# Patient Record
Sex: FEMALE | Race: WHITE | Employment: UNEMPLOYED | URBAN - NONMETROPOLITAN AREA
[De-identification: names, ages, dates, MRNs, and addresses within clinical notes are randomized per-mention and may not be internally consistent; named-entity substitution may affect disease eponyms.]

---

## 2019-04-04 ENCOUNTER — HOSPITAL ENCOUNTER (INPATIENT)
Age: 74
LOS: 11 days | Discharge: HOME HEALTH | DRG: 477 | End: 2019-04-15
Attending: INTERNAL MEDICINE | Admitting: INTERNAL MEDICINE
Payer: MEDICARE

## 2019-04-04 DIAGNOSIS — Z00.6 EXAMINATION FOR NORMAL COMPARISON FOR CLINICAL RESEARCH: ICD-10-CM

## 2019-04-04 DIAGNOSIS — M86.00 ACUTE HEMATOGENOUS OSTEOMYELITIS, UNSPECIFIED SITE (HCC): Primary | ICD-10-CM

## 2019-04-04 PROBLEM — N30.00 ACUTE CYSTITIS WITHOUT HEMATURIA: Status: ACTIVE | Noted: 2019-04-04

## 2019-04-04 PROBLEM — I10 ESSENTIAL HYPERTENSION: Status: ACTIVE | Noted: 2019-04-04

## 2019-04-04 PROBLEM — Z79.4 CONTROLLED TYPE 2 DIABETES MELLITUS WITHOUT COMPLICATION, WITH LONG-TERM CURRENT USE OF INSULIN (HCC): Status: ACTIVE | Noted: 2019-04-04

## 2019-04-04 PROBLEM — E11.9 CONTROLLED TYPE 2 DIABETES MELLITUS WITHOUT COMPLICATION, WITH LONG-TERM CURRENT USE OF INSULIN (HCC): Status: ACTIVE | Noted: 2019-04-04

## 2019-04-04 PROBLEM — M86.9 OSTEOMYELITIS (HCC): Status: ACTIVE | Noted: 2019-04-04

## 2019-04-04 LAB
ALBUMIN SERPL-MCNC: 2.6 G/DL (ref 3.5–5.1)
ALP BLD-CCNC: 94 U/L (ref 38–126)
ALT SERPL-CCNC: < 5 U/L (ref 11–66)
ANION GAP SERPL CALCULATED.3IONS-SCNC: 13 MEQ/L (ref 8–16)
AST SERPL-CCNC: 6 U/L (ref 5–40)
BILIRUB SERPL-MCNC: 0.5 MG/DL (ref 0.3–1.2)
BILIRUBIN DIRECT: < 0.2 MG/DL (ref 0–0.3)
BUN BLDV-MCNC: 3 MG/DL (ref 7–22)
CALCIUM SERPL-MCNC: 8.7 MG/DL (ref 8.5–10.5)
CHLORIDE BLD-SCNC: 105 MEQ/L (ref 98–111)
CO2: 23 MEQ/L (ref 23–33)
CREAT SERPL-MCNC: 0.4 MG/DL (ref 0.4–1.2)
ERYTHROCYTE [DISTWIDTH] IN BLOOD BY AUTOMATED COUNT: 15.8 % (ref 11.5–14.5)
ERYTHROCYTE [DISTWIDTH] IN BLOOD BY AUTOMATED COUNT: 47.8 FL (ref 35–45)
GFR SERPL CREATININE-BSD FRML MDRD: > 90 ML/MIN/1.73M2
GLUCOSE BLD-MCNC: 105 MG/DL (ref 70–108)
GLUCOSE BLD-MCNC: 93 MG/DL (ref 70–108)
HCT VFR BLD CALC: 32.4 % (ref 37–47)
HEMOGLOBIN: 10 GM/DL (ref 12–16)
LACTIC ACID: 1.1 MMOL/L (ref 0.5–2.2)
MCH RBC QN AUTO: 26.2 PG (ref 26–33)
MCHC RBC AUTO-ENTMCNC: 30.9 GM/DL (ref 32.2–35.5)
MCV RBC AUTO: 85 FL (ref 81–99)
PLATELET # BLD: 286 THOU/MM3 (ref 130–400)
PMV BLD AUTO: 10 FL (ref 9.4–12.4)
POTASSIUM SERPL-SCNC: 4.4 MEQ/L (ref 3.5–5.2)
PROCALCITONIN: 0.06 NG/ML (ref 0.01–0.09)
RBC # BLD: 3.81 MILL/MM3 (ref 4.2–5.4)
SODIUM BLD-SCNC: 141 MEQ/L (ref 135–145)
TOTAL PROTEIN: 6.9 G/DL (ref 6.1–8)
WBC # BLD: 9.3 THOU/MM3 (ref 4.8–10.8)

## 2019-04-04 PROCEDURE — 6360000002 HC RX W HCPCS: Performed by: PHYSICIAN ASSISTANT

## 2019-04-04 PROCEDURE — 84145 PROCALCITONIN (PCT): CPT

## 2019-04-04 PROCEDURE — 83036 HEMOGLOBIN GLYCOSYLATED A1C: CPT

## 2019-04-04 PROCEDURE — 85027 COMPLETE CBC AUTOMATED: CPT

## 2019-04-04 PROCEDURE — 83605 ASSAY OF LACTIC ACID: CPT

## 2019-04-04 PROCEDURE — 80053 COMPREHEN METABOLIC PANEL: CPT

## 2019-04-04 PROCEDURE — 99223 1ST HOSP IP/OBS HIGH 75: CPT | Performed by: PHYSICIAN ASSISTANT

## 2019-04-04 PROCEDURE — 6370000000 HC RX 637 (ALT 250 FOR IP): Performed by: PHYSICIAN ASSISTANT

## 2019-04-04 PROCEDURE — 87040 BLOOD CULTURE FOR BACTERIA: CPT

## 2019-04-04 PROCEDURE — 82948 REAGENT STRIP/BLOOD GLUCOSE: CPT

## 2019-04-04 PROCEDURE — 1200000003 HC TELEMETRY R&B

## 2019-04-04 PROCEDURE — 36415 COLL VENOUS BLD VENIPUNCTURE: CPT

## 2019-04-04 PROCEDURE — 82248 BILIRUBIN DIRECT: CPT

## 2019-04-04 PROCEDURE — 2580000003 HC RX 258: Performed by: PHYSICIAN ASSISTANT

## 2019-04-04 RX ORDER — ONDANSETRON 2 MG/ML
4 INJECTION INTRAMUSCULAR; INTRAVENOUS EVERY 6 HOURS PRN
Status: DISCONTINUED | OUTPATIENT
Start: 2019-04-04 | End: 2019-04-07

## 2019-04-04 RX ORDER — DOCUSATE SODIUM 100 MG/1
100 CAPSULE, LIQUID FILLED ORAL DAILY
Status: DISCONTINUED | OUTPATIENT
Start: 2019-04-04 | End: 2019-04-07

## 2019-04-04 RX ORDER — METOPROLOL TARTRATE 50 MG/1
50 TABLET, FILM COATED ORAL 2 TIMES DAILY
Status: DISCONTINUED | OUTPATIENT
Start: 2019-04-04 | End: 2019-04-15 | Stop reason: HOSPADM

## 2019-04-04 RX ORDER — NICOTINE POLACRILEX 4 MG
15 LOZENGE BUCCAL PRN
Status: DISCONTINUED | OUTPATIENT
Start: 2019-04-04 | End: 2019-04-15 | Stop reason: HOSPADM

## 2019-04-04 RX ORDER — ACETAMINOPHEN 500 MG
1000 TABLET ORAL EVERY 6 HOURS PRN
Status: DISCONTINUED | OUTPATIENT
Start: 2019-04-04 | End: 2019-04-15 | Stop reason: HOSPADM

## 2019-04-04 RX ORDER — CLOPIDOGREL BISULFATE 75 MG/1
75 TABLET ORAL NIGHTLY
Status: ON HOLD | COMMUNITY
End: 2019-05-16 | Stop reason: HOSPADM

## 2019-04-04 RX ORDER — DOCUSATE SODIUM 100 MG/1
100 CAPSULE, LIQUID FILLED ORAL DAILY
Status: ON HOLD | COMMUNITY
End: 2019-05-16 | Stop reason: HOSPADM

## 2019-04-04 RX ORDER — PHENOL 1.4 %
1 AEROSOL, SPRAY (ML) MUCOUS MEMBRANE DAILY
Status: ON HOLD | COMMUNITY
End: 2019-05-16 | Stop reason: HOSPADM

## 2019-04-04 RX ORDER — CALCIUM CARBONATE 500(1250)
500 TABLET ORAL DAILY
Status: DISCONTINUED | OUTPATIENT
Start: 2019-04-04 | End: 2019-04-15 | Stop reason: HOSPADM

## 2019-04-04 RX ORDER — DEXTROSE MONOHYDRATE 25 G/50ML
12.5 INJECTION, SOLUTION INTRAVENOUS PRN
Status: DISCONTINUED | OUTPATIENT
Start: 2019-04-04 | End: 2019-04-15 | Stop reason: HOSPADM

## 2019-04-04 RX ORDER — INSULIN GLARGINE 100 [IU]/ML
INJECTION, SOLUTION SUBCUTANEOUS NIGHTLY
Status: ON HOLD | COMMUNITY
End: 2019-05-16 | Stop reason: HOSPADM

## 2019-04-04 RX ORDER — CEFAZOLIN SODIUM 1 G/50ML
1 INJECTION, SOLUTION INTRAVENOUS EVERY 8 HOURS
Status: DISCONTINUED | OUTPATIENT
Start: 2019-04-04 | End: 2019-04-04

## 2019-04-04 RX ORDER — HYDROCHLOROTHIAZIDE 12.5 MG/1
12.5 CAPSULE, GELATIN COATED ORAL DAILY
COMMUNITY
End: 2019-04-15 | Stop reason: HOSPADM

## 2019-04-04 RX ORDER — ATORVASTATIN CALCIUM 20 MG/1
20 TABLET, FILM COATED ORAL NIGHTLY
Status: DISCONTINUED | OUTPATIENT
Start: 2019-04-04 | End: 2019-04-15 | Stop reason: HOSPADM

## 2019-04-04 RX ORDER — INSULIN GLARGINE 100 [IU]/ML
0.25 INJECTION, SOLUTION SUBCUTANEOUS NIGHTLY
Status: DISCONTINUED | OUTPATIENT
Start: 2019-04-04 | End: 2019-04-07

## 2019-04-04 RX ORDER — ACETAMINOPHEN 500 MG
1000 TABLET ORAL EVERY 6 HOURS PRN
COMMUNITY
End: 2019-04-15 | Stop reason: HOSPADM

## 2019-04-04 RX ORDER — SENNA PLUS 8.6 MG/1
1 TABLET ORAL PRN
Status: ON HOLD | COMMUNITY
End: 2019-05-16 | Stop reason: HOSPADM

## 2019-04-04 RX ORDER — HYDROCHLOROTHIAZIDE 12.5 MG/1
12.5 CAPSULE, GELATIN COATED ORAL DAILY
Status: DISCONTINUED | OUTPATIENT
Start: 2019-04-04 | End: 2019-04-14

## 2019-04-04 RX ORDER — AMLODIPINE BESYLATE 5 MG/1
5 TABLET ORAL DAILY
Status: DISCONTINUED | OUTPATIENT
Start: 2019-04-04 | End: 2019-04-15 | Stop reason: HOSPADM

## 2019-04-04 RX ORDER — SODIUM CHLORIDE 9 MG/ML
INJECTION, SOLUTION INTRAVENOUS CONTINUOUS
Status: DISCONTINUED | OUTPATIENT
Start: 2019-04-04 | End: 2019-04-06

## 2019-04-04 RX ORDER — DEXTROSE MONOHYDRATE 50 MG/ML
100 INJECTION, SOLUTION INTRAVENOUS PRN
Status: DISCONTINUED | OUTPATIENT
Start: 2019-04-04 | End: 2019-04-15 | Stop reason: HOSPADM

## 2019-04-04 RX ORDER — METOPROLOL TARTRATE 50 MG/1
50 TABLET, FILM COATED ORAL 2 TIMES DAILY
Status: ON HOLD | COMMUNITY
End: 2019-05-16 | Stop reason: HOSPADM

## 2019-04-04 RX ORDER — NITROFURANTOIN MACROCRYSTALS 50 MG/1
50 CAPSULE ORAL DAILY
COMMUNITY
End: 2019-04-15 | Stop reason: HOSPADM

## 2019-04-04 RX ORDER — LISINOPRIL 20 MG/1
20 TABLET ORAL 2 TIMES DAILY
Status: DISCONTINUED | OUTPATIENT
Start: 2019-04-04 | End: 2019-04-15 | Stop reason: HOSPADM

## 2019-04-04 RX ORDER — LISINOPRIL 20 MG/1
20 TABLET ORAL 2 TIMES DAILY
Status: ON HOLD | COMMUNITY
End: 2019-05-16 | Stop reason: HOSPADM

## 2019-04-04 RX ORDER — RANITIDINE 150 MG/1
150 CAPSULE ORAL 2 TIMES DAILY
Status: ON HOLD | COMMUNITY
End: 2019-05-16 | Stop reason: HOSPADM

## 2019-04-04 RX ORDER — ATORVASTATIN CALCIUM 20 MG/1
20 TABLET, FILM COATED ORAL NIGHTLY
Status: ON HOLD | COMMUNITY
End: 2019-05-16 | Stop reason: HOSPADM

## 2019-04-04 RX ORDER — MAGNESIUM 30 MG
250 TABLET ORAL DAILY
Status: ON HOLD | COMMUNITY
End: 2019-05-16 | Stop reason: HOSPADM

## 2019-04-04 RX ORDER — MULTIVITAMIN/IRON/FOLIC ACID 18MG-0.4MG
250 TABLET ORAL DAILY
Status: DISCONTINUED | OUTPATIENT
Start: 2019-04-04 | End: 2019-04-08

## 2019-04-04 RX ORDER — AMLODIPINE BESYLATE 5 MG/1
5 TABLET ORAL DAILY
Status: ON HOLD | COMMUNITY
End: 2019-05-16 | Stop reason: HOSPADM

## 2019-04-04 RX ORDER — CITALOPRAM 20 MG/1
10 TABLET ORAL DAILY
Status: DISCONTINUED | OUTPATIENT
Start: 2019-04-04 | End: 2019-04-15 | Stop reason: HOSPADM

## 2019-04-04 RX ORDER — CITALOPRAM 10 MG/1
10 TABLET ORAL DAILY
Status: ON HOLD | COMMUNITY
End: 2019-05-16 | Stop reason: HOSPADM

## 2019-04-04 RX ADMIN — METOPROLOL TARTRATE 50 MG: 50 TABLET, FILM COATED ORAL at 22:16

## 2019-04-04 RX ADMIN — ONDANSETRON 4 MG: 2 INJECTION INTRAMUSCULAR; INTRAVENOUS at 21:53

## 2019-04-04 RX ADMIN — LISINOPRIL 20 MG: 20 TABLET ORAL at 22:16

## 2019-04-04 RX ADMIN — INSULIN GLARGINE 24 UNITS: 100 INJECTION, SOLUTION SUBCUTANEOUS at 22:28

## 2019-04-04 RX ADMIN — ENOXAPARIN SODIUM 40 MG: 40 INJECTION SUBCUTANEOUS at 22:58

## 2019-04-04 RX ADMIN — VANCOMYCIN HYDROCHLORIDE 1500 MG: 1 INJECTION, POWDER, LYOPHILIZED, FOR SOLUTION INTRAVENOUS at 22:31

## 2019-04-04 RX ADMIN — SODIUM CHLORIDE: 9 INJECTION, SOLUTION INTRAVENOUS at 21:49

## 2019-04-04 RX ADMIN — ATORVASTATIN CALCIUM 20 MG: 20 TABLET, FILM COATED ORAL at 22:17

## 2019-04-04 RX ADMIN — PIPERACILLIN SODIUM,TAZOBACTAM SODIUM 3.38 G: 3; .375 INJECTION, POWDER, FOR SOLUTION INTRAVENOUS at 22:58

## 2019-04-04 SDOH — HEALTH STABILITY: MENTAL HEALTH: HOW OFTEN DO YOU HAVE A DRINK CONTAINING ALCOHOL?: NEVER

## 2019-04-05 ENCOUNTER — APPOINTMENT (OUTPATIENT)
Dept: CT IMAGING | Age: 74
DRG: 477 | End: 2019-04-05
Attending: INTERNAL MEDICINE
Payer: MEDICARE

## 2019-04-05 ENCOUNTER — APPOINTMENT (OUTPATIENT)
Dept: MRI IMAGING | Age: 74
DRG: 477 | End: 2019-04-05
Attending: INTERNAL MEDICINE
Payer: MEDICARE

## 2019-04-05 PROBLEM — E44.0 MODERATE MALNUTRITION (HCC): Status: ACTIVE | Noted: 2019-04-05

## 2019-04-05 PROBLEM — E11.69 TYPE 2 DIABETES MELLITUS WITH OTHER SPECIFIED COMPLICATION (HCC): Status: ACTIVE | Noted: 2019-04-04

## 2019-04-05 LAB
AVERAGE GLUCOSE: 138 MG/DL (ref 70–126)
BILIRUBIN URINE: NEGATIVE
BLOOD, URINE: NEGATIVE
CHARACTER, URINE: CLEAR
COLOR: YELLOW
GLUCOSE BLD-MCNC: 73 MG/DL (ref 70–108)
GLUCOSE BLD-MCNC: 79 MG/DL (ref 70–108)
GLUCOSE BLD-MCNC: 80 MG/DL (ref 70–108)
GLUCOSE URINE: NEGATIVE MG/DL
HBA1C MFR BLD: 6.6 % (ref 4.4–6.4)
KETONES, URINE: NEGATIVE
LEUKOCYTE ESTERASE, URINE: NEGATIVE
NITRITE, URINE: NEGATIVE
PH UA: 6.5 (ref 5–9)
PROTEIN UA: NEGATIVE
SPECIFIC GRAVITY, URINE: 1.01 (ref 1–1.03)
UROBILINOGEN, URINE: 1 EU/DL (ref 0–1)

## 2019-04-05 PROCEDURE — 51798 US URINE CAPACITY MEASURE: CPT

## 2019-04-05 PROCEDURE — 6360000002 HC RX W HCPCS: Performed by: PHYSICIAN ASSISTANT

## 2019-04-05 PROCEDURE — 2580000003 HC RX 258: Performed by: PHYSICIAN ASSISTANT

## 2019-04-05 PROCEDURE — 3209999900 CT COMPARISON OF OUTSIDE FILMS

## 2019-04-05 PROCEDURE — 81003 URINALYSIS AUTO W/O SCOPE: CPT

## 2019-04-05 PROCEDURE — 2580000003 HC RX 258: Performed by: INTERNAL MEDICINE

## 2019-04-05 PROCEDURE — 6370000000 HC RX 637 (ALT 250 FOR IP): Performed by: PHYSICIAN ASSISTANT

## 2019-04-05 PROCEDURE — 6370000000 HC RX 637 (ALT 250 FOR IP): Performed by: INTERNAL MEDICINE

## 2019-04-05 PROCEDURE — 1200000003 HC TELEMETRY R&B

## 2019-04-05 PROCEDURE — 82948 REAGENT STRIP/BLOOD GLUCOSE: CPT

## 2019-04-05 PROCEDURE — 99232 SBSQ HOSP IP/OBS MODERATE 35: CPT | Performed by: INTERNAL MEDICINE

## 2019-04-05 PROCEDURE — 6360000002 HC RX W HCPCS: Performed by: INTERNAL MEDICINE

## 2019-04-05 PROCEDURE — 92610 EVALUATE SWALLOWING FUNCTION: CPT

## 2019-04-05 PROCEDURE — 3209999900 MRI COMPARISON OF OUTSIDE FILMS

## 2019-04-05 RX ORDER — BISACODYL 10 MG
10 SUPPOSITORY, RECTAL RECTAL ONCE
Status: COMPLETED | OUTPATIENT
Start: 2019-04-05 | End: 2019-04-05

## 2019-04-05 RX ORDER — DEXTROSE MONOHYDRATE 25 G/50ML
12.5 INJECTION, SOLUTION INTRAVENOUS ONCE
Status: COMPLETED | OUTPATIENT
Start: 2019-04-05 | End: 2019-04-05

## 2019-04-05 RX ORDER — HYDROCHLOROTHIAZIDE 12.5 MG/1
12.5 CAPSULE, GELATIN COATED ORAL ONCE
Status: COMPLETED | OUTPATIENT
Start: 2019-04-05 | End: 2019-04-06

## 2019-04-05 RX ORDER — SODIUM CHLORIDE 450 MG/100ML
INJECTION, SOLUTION INTRAVENOUS CONTINUOUS
Status: CANCELLED | OUTPATIENT
Start: 2019-04-05

## 2019-04-05 RX ORDER — LACTOBACILLUS RHAMNOSUS GG 10B CELL
1 CAPSULE ORAL
Status: DISCONTINUED | OUTPATIENT
Start: 2019-04-06 | End: 2019-04-15 | Stop reason: HOSPADM

## 2019-04-05 RX ORDER — POLYETHYLENE GLYCOL 3350 17 G/17G
17 POWDER, FOR SOLUTION ORAL DAILY
Status: DISCONTINUED | OUTPATIENT
Start: 2019-04-05 | End: 2019-04-07

## 2019-04-05 RX ORDER — ONDANSETRON 2 MG/ML
4 INJECTION INTRAMUSCULAR; INTRAVENOUS EVERY 4 HOURS
Status: COMPLETED | OUTPATIENT
Start: 2019-04-05 | End: 2019-04-06

## 2019-04-05 RX ADMIN — ONDANSETRON 4 MG: 2 INJECTION INTRAMUSCULAR; INTRAVENOUS at 05:32

## 2019-04-05 RX ADMIN — CITALOPRAM 10 MG: 20 TABLET, FILM COATED ORAL at 10:05

## 2019-04-05 RX ADMIN — ONDANSETRON 4 MG: 2 INJECTION INTRAMUSCULAR; INTRAVENOUS at 17:11

## 2019-04-05 RX ADMIN — ENOXAPARIN SODIUM 40 MG: 40 INJECTION SUBCUTANEOUS at 11:53

## 2019-04-05 RX ADMIN — SODIUM CHLORIDE: 9 INJECTION, SOLUTION INTRAVENOUS at 21:32

## 2019-04-05 RX ADMIN — CEFTRIAXONE SODIUM 2 G: 2 INJECTION, POWDER, FOR SOLUTION INTRAMUSCULAR; INTRAVENOUS at 12:50

## 2019-04-05 RX ADMIN — BISACODYL 10 MG: 10 SUPPOSITORY RECTAL at 11:39

## 2019-04-05 RX ADMIN — AMLODIPINE BESYLATE 5 MG: 5 TABLET ORAL at 10:03

## 2019-04-05 RX ADMIN — ONDANSETRON 4 MG: 2 INJECTION INTRAMUSCULAR; INTRAVENOUS at 21:51

## 2019-04-05 RX ADMIN — ONDANSETRON 4 MG: 2 INJECTION INTRAMUSCULAR; INTRAVENOUS at 12:27

## 2019-04-05 RX ADMIN — PIPERACILLIN SODIUM,TAZOBACTAM SODIUM 3.38 G: 3; .375 INJECTION, POWDER, FOR SOLUTION INTRAVENOUS at 06:16

## 2019-04-05 RX ADMIN — DEXTROSE MONOHYDRATE 12.5 G: 25 INJECTION, SOLUTION INTRAVENOUS at 17:58

## 2019-04-05 RX ADMIN — SODIUM CHLORIDE: 9 INJECTION, SOLUTION INTRAVENOUS at 11:04

## 2019-04-05 RX ADMIN — METOPROLOL TARTRATE 50 MG: 50 TABLET, FILM COATED ORAL at 11:51

## 2019-04-05 RX ADMIN — HYDROCHLOROTHIAZIDE 12.5 MG: 12.5 CAPSULE ORAL at 10:13

## 2019-04-05 ASSESSMENT — PAIN SCALES - GENERAL: PAINLEVEL_OUTOF10: 0

## 2019-04-05 NOTE — H&P
History & Physical        Patient:  Jorgito Hendrix  YOB: 1945    MRN: [id number]     Acct: [de-identified]    PCP: No primary care provider on file. Date of Admission: 4/4/2019    Date of Service: Pt seen/examined on 04/04/19  and Admitted to Inpatient with expected LOS greater than two midnights due to medical therapy. Chief Complaint:  Suspected Osteomyelitis with discitis at T8-T9      History Of Present Illness:      72 y.o. female with PMH of UTI, CVA, DM II, dysphagia, who presented to [facility] with nausea and vomiting that has been present for about 3 weeks. Per pt , pt was recently hospitalized 6 weeks ago at [facility] for urinary infection that \"got into her blood\". Pt was discharged on abx for 14 day and seemed to improve.  states that on and off for the past 3 weeks pt has had a cough that has been productive of sputum with associated vomiting and crampy abd pain. Per , who acts as pt's caretaker, they decided to be evaluated at the ER at [facility] on 04/02 given pt's general malaise with inability to pinpoint exactly an area of pain/discomfort. Per [facility] ED note MRI performed there and found discitis at T8-T9 with possible associated osteomyelitis, and it was suggested pt be transferred to a larger hospital with more resources. Since arrival pt states that her abdominal pain is better along with the nausea and she denies emesis. Denies any sort of back pain or discomfort even upon movement and manipulation. Denies fever/chills/lightheadedness/CP/SOB/diarrhea/dysuria and has no further complaints at this time.        Past Medical History:          Diagnosis Date    Cerebral artery occlusion with cerebral infarction ([address]     Right sided weakness    Chronic kidney disease     Diabetes mellitus ([facility]     GERD (gastroesophageal reflux disease)     Dysphagia/GERD    Hypertension        Past Surgical History:          Procedure Laterality Date    HYSTERECTOMY         Medications Prior to Admission:      Prior to Admission medications    Medication Sig Start Date End Date Taking? Authorizing Provider   senna (SENOKOT) 8.6 MG tablet Take 1 tablet by mouth as needed for Constipation   Yes Historical Provider, MD   amLODIPine (NORVASC) 5 MG tablet Take 5 mg by mouth daily   Yes Historical Provider, MD   clopidogrel (PLAVIX) 75 MG tablet Take 75 mg by mouth nightly   Yes Historical Provider, MD   insulin glargine (LANTUS) 100 UNIT/ML injection vial Inject into the skin nightly   Yes Historical Provider, MD   metoprolol tartrate (LOPRESSOR) 50 MG tablet Take 50 mg by mouth 2 times daily   Yes Historical Provider, MD   hydrochlorothiazide (MICROZIDE) 12.5 MG capsule Take 12.5 mg by mouth daily   Yes Historical Provider, MD   acetaminophen (TYLENOL) 500 MG tablet Take 1,000 mg by mouth every 6 hours as needed for Pain   Yes Historical Provider, MD   atorvastatin (LIPITOR) 20 MG tablet Take 20 mg by mouth nightly   Yes Historical Provider, MD   calcium carbonate 600 MG TABS tablet Take 1 tablet by mouth daily   Yes Historical Provider, MD   citalopram (CELEXA) 10 MG tablet Take 10 mg by mouth daily   Yes Historical Provider, MD   lisinopril (PRINIVIL;ZESTRIL) 20 MG tablet Take 20 mg by mouth 2 times daily   Yes Historical Provider, MD   magnesium 30 MG tablet Take 250 mg by mouth daily   Yes Historical Provider, MD   metFORMIN (GLUCOPHAGE) 1000 MG tablet Take 1,000 mg by mouth 2 times daily (with meals)   Yes Historical Provider, MD   ranitidine (ZANTAC) 150 MG capsule Take 150 mg by mouth 2 times daily   Yes Historical Provider, MD   docusate sodium (COLACE) 100 MG capsule Take 100 mg by mouth daily   Yes Historical Provider, MD   nitrofurantoin (MACRODANTIN) 50 MG capsule Take 50 mg by mouth daily   Yes Historical Provider, MD       Allergies:  Patient has no known allergies. Social History:      The patient currently lives at home with . TOBACCO:   reports that she has never smoked. She has never used smokeless tobacco.  ETOH:   reports that she does not drink alcohol. Family History:      Reviewed in detail and negative for DM, CAD, Cancer, CVA. Positive as follows:    No family history on file. Diet:  DIET CARB CONTROL;    REVIEW OF SYSTEMS:   Pertinent positives as noted in the HPI. All other systems reviewed and negative. PHYSICAL EXAM:    BP (!) 177/74   Pulse 79   Temp 98.5 °F (36.9 °C) (Oral)   Resp 18   Ht 5' (1.524 m)   Wt 213 lb 14.4 oz (97 kg) Comment: pts  states he thinks she weighs less, will zero bed next time pt is out of it  LMP  (LMP Unknown) Comment: post menopase  SpO2 94%   Breastfeeding? No   BMI 41.77 kg/m²     General appearance:  No apparent distress, appears sickly, appears stated age and cooperative, obese. HEENT:  Normal cephalic, atraumatic without obvious deformity. Pupils equal, round, and reactive to light. Extra ocular muscles intact. Conjunctivae/corneas clear. Neck: Supple, with full range of motion. No jugular venous distention. Trachea midline. Respiratory:  Normal respiratory effort. Clear to auscultation, bilaterally without Rales/Wheezes/Rhonchi. Cardiovascular:  Regular rate and rhythm with normal S1/S2 without murmurs, rubs or gallops. Abdomen: Soft, non-tender, non-distended with normal bowel sounds. Musculoskeletal:  No clubbing, cyanosis, +1 edema bilaterally. Full range of motion without deformity left side, right sided deficits. Skin: Skin color, texture, turgor normal.  No rashes or lesions. Neurologic:  Neurovascularly intact without any focal sensory/motor deficits.  Cranial nerves: II-XII intact, grossly non-focal, right sided hemiparesis   Psychiatric:  Alert and oriented, thought content appropriate, normal insight  Capillary Refill: Brisk,< 3 seconds   Peripheral Pulses: +2 palpable, equal bilaterally       Labs:     No results for input(s): WBC, HGB, HCT, PLT in the last 72 hours. No results for input(s): NA, K, CL, CO2, BUN, CREATININE, CALCIUM, PHOS in the last 72 hours. Invalid input(s): MAGNES  No results for input(s): AST, ALT, BILIDIR, BILITOT, ALKPHOS in the last 72 hours. No results for input(s): INR in the last 72 hours. No results for input(s): [place] in the last 72 hours. Urinalysis:    No results found for: NITRU, WBCUA, BACTERIA, RBCUA, BLOODU, SPECGRAV, GLUCOSEU    Intake & Output:  No intake/output data recorded. No intake/output data recorded. Radiology:         No orders to display        DVT prophylaxis: Lovenox    Code Status: Full Code      PT/OT Eval Status: on case     Carlos Alberto Hardy    Diagnosis Date Noted    Osteomyelitis [address] [M86.9] 04/04/2019    Acute cystitis without hematuria [N30.00] 04/04/2019    Controlled type 2 diabetes mellitus without complication, with long-term current use of insulin ([facility] [address] [E11.9, Z79.4] 04/04/2019    Essential hypertension [I10] 04/04/2019       PLAN:    1. Osteomyelitis  - transferred from [facility], MRI showed discitis at T8-T9 with possible osteo  - consult to ID  - pt started on Vanc and Zosyn pending blood culture results  - CBC, IVFs  - currently afebrile, WBC of     2. UTI  - chronic problem, pt recently diagnosed in Feb + for Proteus  - UA at [facility] + for nitrites  - abx coverage from #1  - external catheter in place    3. DM II, uncontrolled  - last HgbA1c per [facility], 9.5  -hypoglycemia protocol in place, carb control diet, POCT glucose ac/hs  - insulin sliding scale    4. HTN  - BP of 177/74  - continue home medication: Norvasc and Microzide         Thank you No primary care provider on file. for the opportunity to be involved in this patient's care.     Electronically signed by Kavon Westbrook PA-C on 4/4/2019 at 8:47 PM

## 2019-04-05 NOTE — PROGRESS NOTES
Pharmacy Note  Vancomycin Consult    Darren Álvarez is a 72 y.o. female started on Vancomycin for Osteomyelitis; consult received from [facility] to manage therapy. Also receiving the following antibiotics: Zosyn. Patient Active Problem List   Diagnosis    Osteomyelitis ([address]       Allergies:  Patient has no known allergies. Temp max: 98.5    No results for input(s): BUN in the last 72 hours. No results for input(s): CREATININE in the last 72 hours. No results for input(s): WBC in the last 72 hours. No intake or output data in the 24 hours ending 04/04/19 2139    Culture Date Source Results   4/4/19 BC#1 In process   4/4/19 BC#2 In process            Ht Readings from Last 1 Encounters:   04/04/19 5' (1.524 m)        Wt Readings from Last 1 Encounters:   04/04/19 213 lb 14.4 oz (97 kg)         Body mass index is 41.77 kg/m². CrCl: (awaiting lab)      Assessment/Plan:  Will initiate vancomycin 1500 mg IV x1 pending lab. Timing of trough level will be determined based on culture results, renal function, and clinical response. SCr to be monitored. Thank you for the consult. Will continue to follow.      Nicola Tian 4/4/2019 9:43 PM

## 2019-04-05 NOTE — PROGRESS NOTES
Patient admitted to [phone] from [facility]. Patient oriented to room and call light. Christiano Cloud at bedside. Call light placed within reach. Please refer to flowsheets.

## 2019-04-05 NOTE — PROGRESS NOTES
[facility]           Progress Note    Patient Name:  Aramis Recinos  Medical Record Number:  [id number]  YOB: 1945    Date:  4/5/2019  Time:  9:44 AM  Completed By:  Phillip Holguin RN    Reason for Palliative Care Evaluation:  Code status; goals    Current Issues:  []  Pain  []  Fatigue  []  Nausea  []  Anxiety  []  Depression  []  Shortness of Breath  [x]  Constipation:no BM x 1 1/2 weeks  [x]  Appetite: only eating 3-4 bites/meal x 3-4 weeks  []  Other:    Advance Directives:none on file  [] [facility] DNR Form  [] Living Will  [] Medical POA    Current Code Status  [x] Full Resuscitation  [] DNR-Comfort Care-Arrest  [] DNR-Comfort Care  [] Limited   [] No CPR   [] No shock   [] No ET intubation/reintubation   [] No resuscitative medications   [] Other limitation:    Performance Status:  30    Family/Patient Discussion:  Patient resting in bed.  at the bedside. Palliative care introduced. When asked how she is doing, patient states 2040.  states that this is the answer she always gives. Patient states that her appetite is poor but denies any pain/discomfort. Patient closes eyes and stops interaction with this RN.  shares that the patient has not had a BM for 1 1/2 weeks.  states that the patient has only been eating 3-4 bites of food/meal for the past 3-4 weeks and has had a weight loss.  states that the patient has been bed bound since her CVA 2 years ago.  states that they have no children but his sister does relieve him so that he may go to the store as needed.  states that he has a nurse come occasionally to assist but that he is her sole care taker. Conversation about code status levels and what each level entails. Discussed complications associated with resuscitative measures such as rib fractures, brain and organ damage.  states that the patient has always wished for full code status and he wishes to respect this.  does have questions regarding DNR form and information/discussion given. Much emotional support given. Plan/Follow-Up:  Updated primary RN. Dr. Carter Getting text messaged for suppository for no BM x 1 1/2 weeks.  does state that MOM or other laxatives give patient diarrhea-did inform Dr. Carter Getting of this. Dietician consult placed for poor appetite over the past month. Please call palliative care if any other needs arise.     Leopoldo Pour, RN  4/5/2019,  9:44 AM

## 2019-04-05 NOTE — FLOWSHEET NOTE
[address] [address]  PHYSICAL THERAPY MISSED TREATMENT NOTE  ACUTE CARE  [facility]              Missed Treatment  PT attempted 2x this date. Pt currently does not have any activity orders. RN, Fior Mosquera is aware. PT waiting for clearance from ortho/neuro regarding the finding on the MRI regarding \"discitis with bone erosion at the level of lower thoracic spine\". PT will re-attempt as time allows, pt is appropriate and plan of care has been established.

## 2019-04-05 NOTE — PROCEDURES
Bladder scan was completed by JOHN Franz at 1655. The residual amount of 364 ml was reported to [employer].

## 2019-04-05 NOTE — PROGRESS NOTES
Pharmacy Vancomycin Consult     Vancomycin Day: 2  Current Dosing: Vancomycin IV 1500 mg x1    Temp max:  96.7    Recent Labs     04/04/19 2127   BUN 3*       Recent Labs     04/04/19 2127   CREATININE 0.4       Recent Labs     04/04/19 2127   WBC 9.3         Intake/Output Summary (Last 24 hours) at 4/5/2019 0053  Last data filed at 4/4/2019 2310  Gross per 24 hour   Intake 950 ml   Output 300 ml   Net 650 ml       Culture Date Source Results   4/4/19 BC#1 In process   4/4/19 BC#2 In process            Ht Readings from Last 1 Encounters:   04/04/19 5' (1.524 m)        Wt Readings from Last 1 Encounters:   04/04/19 213 lb 14.4 oz (97 kg)         Body mass index is 41.77 kg/m². CrCl: 196 mL/min      Assessment/Plan:   Lab resulted will continue Vancomycin IV 1500 mg q12h starting at 1030 4/5/19. Will plan to draw trough prior to the 4th dose. Level not ordered at this time.

## 2019-04-05 NOTE — PROGRESS NOTES
[address] [place] [address]  SPEECH THERAPY  [facility]  Bedside Swallowing Evaluation      SLP Individual Minutes  Time In: 5956  Time Out: 0193  Minutes: 12  Timed Code Treatment Minutes: 0 Minutes       Date: 2019  Patient Name: Naya Coughlin      CSN: [id number]   : 1945  (72 y.o.)  Gender: female   Referring Physician:  Dr Vidya Canada  Diagnosis: suspected osteomyelitis with discitis  Secondary Diagnosis:  dysphagia  History of Present Illness/Injury: 72 y.o. female with PMH of UTI, CVA, DM II, dysphagia, who presented to [address] [place] [address] with nausea and vomiting that has been present for about 3 weeks. Per pt , pt was recently hospitalized 6 weeks ago at [facility] for urinary infection that \"got into her blood\". Pt was discharged on abx for 14 day and seemed to improve.  states that on and off for the past 3 weeks pt has had a cough that has been productive of sputum with associated vomiting and crampy abd pain. Per , who acts as pt's caretaker, they decided to be evaluated at the ER at [facility] on  given pt's general malaise with inability to pinpoint exactly an area of pain/discomfort. Per [facility] ED note MRI performed there and found discitis at T8-T9 with possible associated osteomyelitis, and it was suggested pt be transferred to a larger hospital with more resources. Since arrival pt states that her abdominal pain is better along with the nausea and she denies emesis. Denies any sort of back pain or discomfort even upon movement and manipulation. Denies fever/chills/lightheadedness/CP/SOB/diarrhea/dysuria and has no further complaints at this time.    Speech to assess swallowing function to determine safest oral diet     Past Medical History:   Diagnosis Date    Cerebral artery occlusion with cerebral infarction ([facility] [address]     Right sided weakness    Chronic kidney disease     Diabetes mellitus ([facility] [address]     GERD (gastroesophageal reflux disease) Dysphagia/GERD    Hypertension        Pain:  Did not state    Current Diet: Regular and thin    Respiratory Status: [x] Independent     Behavioral Observation: [x] Alert  and cooperative    ORAL MECHANISM EVALUATION:         Comments:  Facial / Labial [x]WFL [] Impaired []DNT    Lingual [x]WFL [] Impaired []DNT    Dentition [x]WFL [] Impaired []DNT    Velum [x]WFL [] Impaired []DNT    Vocal Quality [x]WFL [] Impaired []DNT    Sensation []WFL [] Impaired [x]DNT    Cough [x]WFL [] Impaired []DNT    Other: []WFL [] Impaired []DNT    Other: []WFL [] Impaired []DNT        PATIENT WAS EVALUATED USING:   Thin liquids by straw and colin cracker    ORAL PHASE: [x] WFL      PHARYNGEAL PHASE: [x] WFL: Pharyngeal phase appears WFLs, but cannot completely rule out pharyngeal phase deficits from a bedside swallow evaluation alone. SIGNS AND SYMPTOMS OF LARYNGEAL PENETRATION / ASPIRATION:  [] NO sign/symptoms of aspiration evident with this evaluation, but cannot rule out silent aspiration. [] Throat Clear  [] Immediate Cough [x] Delayed Cough: 1x following thin liquids  [] Wet Vocal Quality  [] Change in Pulmonary Status  [] OTHER:    IMPRESSIONS:  Pt presents with essentially normal oral and pharyngeal function with no overt s/s of aspiration noted with solids or liquids. Pt did exhibit dry cough 1x following thin liquids by straw. Spouse reports that cough has been inconsistent over past few months. Pt does have history of CVA and previous dysphagia, but spouse reports pt has been tolerating regular diet for some time. No history of pneumonia, which would be suspicious for aspiration. Pt with clear lung sounds and no pulmonary issues noted upon this admission. Recommend pt remain on regular and thn diet with speech therapy to monitor tolerance and pulmonary status 1-2 times. RECOMMENDATIONS:     Modified Barium Swallow: [] Is indicated to further assess    [x] Is NOT indicated at this time;  Will recommend as appropriate. DIET RECOMMENDATIONS:  Regular and thin    STRATEGIES: [] Strategies pending MBS results. [x] Full upright position  [] Small bite/sip [] No Straw [] Multiple Swallow  [] Chin tuck [] Head turn [x] Pulmonary monitoring [] Oral care after all meals  [] Supervision  [] Medication in applesauce []Direct 1:1 Supervision  [] Spoon all liquids [] Alternate solid / liquid [] Limit distractions [] Monitor for fatigue  [] PMV in place for all po [] OTHER:      EDUCATION:   Learner: [x]Patient [x] Significant other [] Son/Daughter [] Parent     [] Other:   Education: [x] Reviewed results and recommendations of this evaluation     [x] Reviewed diet and strategies     [] Reviewed signs, symptoms and risk of aspiration     [] Demonstrated how to thick liquid appropriately. [x] Reviewed goals and Plan of Care     [] OTHER:   Method: [x] Discussion [] Demonstration [] Hand-out     [] OTHER:   Evaluation of Education:     [x] Verbalizes understanding  [] Demonstrates with assistance     [] Demonstrates without assistance []Needs further instruction     [] No evidence of learning  [] Family not present    PATIENT GOALS: [] Pt did not state. Will further assess during treatment. [] Return to the least restricted diet possible     [x] Return to previous level of function     [] OTHER:    PLAN / TREATMENT RECOMMENDATIONS:  [x] Skilled SLP intervention on acute care 3-5 x per week or until goals met and/or pt plateaus in function.   Specific interventions for next session may include: diet monitor    SHORT TERM GOALS:  Short-term Goals  Timeframe for Short-term Goals: 2 weeks  Goal 1: Pt will tolerate regular and thin diet withoput s/s of aspiration to ensure safe and adequate nutrition and hydration    LONG TERM GOALS:  No LTG due to short ELOS       [place] [facility] M.S. [facility]/[id number]

## 2019-04-05 NOTE — FLOWSHEET NOTE
04/05/19 1448   Provider Notification   Reason for Communication Evaluate   Provider Name Dr HAIDER MILNER [facility]   Provider Notification Physician   Method of Communication Secure Message   Response No new orders   Notification Time 56     Notified Dr HAIDER MILNER [facility] that patient had only voided 150 this shift, last BP and that patient had an emesis when she took her microzide.

## 2019-04-05 NOTE — CONSULTS
Consults                                  CONSULTATION NOTE :ID       Patient - Jose Enrique Rawls,  Age - 72 y.o.    - 1945      Room Number - 7K-20/020-A   N -  [id number]   [facility]  Date of Admission -  2019  5:20 PM  Patient's PCP: No primary care provider on file. Requesting Physician: Frederic Zuleta MD    REASON FOR CONSULTATION   Discitis/osteomyelitis of thoracic spine. HISTORY OF PRESENT ILLNESS       This is a very pleasant 72 y.o. female who was admitted to the hospital with a chief complaints of generalized weakness, nausea vomiting and poor appetite. Patient was referred from outside hospital.  After she was found to have discitis of the thoracic spine. Patient was treated last month with E. coli bacteremia due to Proteus and was treated appropriately with 2 weeks of antibiotics. However according to her , she has been very weak, has poor appetite and has been complaining of back pain. She had previous history of stroke and has weakness on the right side of her body. She denies any trauma, no previous history of back surgery. She has no urinary or GI incontinence. Lab work was drawn and her MRI showed discitis with bone erosion at the level of lower thoracic spine. She was also started on treatment for urinary tract infection. She had previous history of UTI including stones in her kidney. After she came to the hospital she was empirically started on Zosyn and vancomycin. She denies any fever or chills. She has limited mobility due to her stroke and deconditioning. Her  takes care of her.   She is diabetic, has chronic kidney disease and had history of stroke    PAST MEDICAL  HISTORY       Past Medical History:   Diagnosis Date    Cerebral artery occlusion with cerebral infarction ([address]     Right sided weakness    Chronic kidney disease     Diabetes mellitus (HCC)     GERD (gastroesophageal reflux disease)     Dysphagia/GERD    Hypertension  Controlled type 2 diabetes mellitus without complication, with long-term current use of insulin (HCC) E11.9, Z79.4    Essential hypertension I10    Moderate malnutrition ([facility]) E44.0           Impression and Recommendation:   · Discitis/osteomyelitis of the lower thoracic spine: Patient will need aspiration of the area for Gram stain and culture by interventional radiology. At this time, patient does not need empiric broad-spectrum antibiotics. · Urinary tract infection: We'll obtain final culture report from outside hospital, will narrow antibiotics to IV ceftriaxone. · Old stroke with right sided weakness  · Diabetes mellitus  Infection Control:   · Standard precautions  Discharge Planning (Coordination of out patient care:   Patient will need IV antibiotics on discharge,  Will need long-term IV antibiotics in extended care facility   Thank you Mary Hightower MD for allowing me to participate in this patient's care.     Emily Matos MD,FACP 4/5/2019 12:57 PM

## 2019-04-05 NOTE — PLAN OF CARE
Problem: Nutrition  Goal: Optimal nutrition therapy  Outcome: Ongoing   Nutrition Problem: Moderate malnutrition, In context of acute illness or injury  Intervention: Food and/or Nutrient Delivery: Continue current diet, Start ONS(as able to tolerate, otherwise consider clear liquids)  Nutritional Goals: Pt. will consume 75% at meals during LOS.

## 2019-04-05 NOTE — CARE COORDINATION
DISCHARGE BARRIERS  4/5/19, 12:27 PM    Reason for Referral: From home may need ECF placement  Mental Status: Alert and oriented  Decision Making: Spouse speaks for patient  Family/Social/Home Environment: Patient resides at home with her spouse. Spouse stated that patient stays on one floor of the house and has a ramp into the home. Spouse stated that his sister assist him at time and helps with transporting patient. Current Services: [facility] once a week for RN  Current Equipment: wheelchair, criss lift, hospital bed  Payment Source: [employer]  Concerns or Barriers to Discharge: Spouse unsure of plan until he see what treatment plan will be  Collabrative List of ECF/HH were provided: declined. Patient has [place] and has gone to [facility] the past for rehab. Teach Back Method used with spouse regarding care plan and discharge plan. Patient verbalize understanding of the plan of care and contribute to goal setting. Anticipated Needs/Discharge Plan: Spoke with Nuzhat at [facility] and she confirmed services for nursing once weekly. Spouse plans home with current services at discharge.      Electronically signed by DOUG Smith on 4/5/2019 at 12:27 PM

## 2019-04-05 NOTE — PROGRESS NOTES
Spoke to primary nurse regarding patient stage 1 to buttock area as well as reddened skin folds. Nurse using zinc protective cream to buttocks and interdry to skin folds. Continue treatment. Make sure pt is on hercules dream air support surface or SPR. Waffle cushion in chair. Implement every 2 hour turns. Consult wound ostomy as needed.

## 2019-04-05 NOTE — CARE COORDINATION
4/5/19, 11:42 AM      Sandra Mendez       Admitted from: direct from [facility] 4/4/2019/ [facility] day: 1   Location: 7K-20/020-A Reason for admit: Osteomyelitis [facility]) [M86.9] Status: inpatient  Admit order signed?: yes  PMH:  has a past medical history of Cerebral artery occlusion with cerebral infarction ([facility] [address] Chronic kidney disease, Diabetes mellitus ([facility] [address] GERD (gastroesophageal reflux disease), and Hypertension. Procedure: Dr Niharika Mckeon ordered disc space biopsy  Pertinent abnormal Imaging:no  Medications:  Scheduled Meds:   cefTRIAXone (ROCEPHIN) IV  2 g Intravenous Q24H    amLODIPine  5 mg Oral Daily    atorvastatin  20 mg Oral Nightly    calcium elemental  500 mg Oral Daily    citalopram  10 mg Oral Daily    docusate sodium  100 mg Oral Daily    hydrochlorothiazide  12.5 mg Oral Daily    lisinopril  20 mg Oral BID    Magnesium Oxide  250 mg Oral Daily    metoprolol tartrate  50 mg Oral BID    insulin glargine  0.25 Units/kg Subcutaneous Nightly    insulin lispro  0-6 Units Subcutaneous TID WC    insulin lispro  0-3 Units Subcutaneous Nightly    enoxaparin  40 mg Subcutaneous BID     Continuous Infusions:   dextrose      sodium chloride 100 mL/hr at 04/05/19 1104      Pertinent Info/Orders/Treatment Plan:  Dr Niharika Mckeon consulted. IV antibiotics. Pain management. Diabetic management. Lovenox. PT/OT. Hx stroke. Telemetry. Diet: DIET CARB CONTROL; Dietary Nutrition Supplements: Clear Liquid Oral Supplement  Dietary Nutrition Supplements: Other Oral Supplement (see comment)   Smoking status:  reports that she has never smoked.  She has never used smokeless tobacco.   PCP: ECF  Readmission: no  Readmission Risk Score: 8%    Discharge Planning  Current Residence:  Private Residence  Living Arrangements:  Spouse/Significant Other   Support Systems:  Spouse/Significant Other, Family Members  Current Services PTA:     Potential Assistance Needed:  Home Care, Extended Care Facility  Potential Assistance Purchasing Medications:  No  Does patient want to participate in local refill/ meds to beds program?  No  Type of Home Care Services:  None  Patient expects to be discharged to:  unsure at this time  Expected Discharge date:  04/15/19  Follow Up Appointment: Best Day/ Time: Monday AM    Discharge Plan: I spoke with Kat Bruno and her . Palliative Care speaking with them. Remains full code status. SW on case.  has been providing 24/7 care for her at home. Bedbound after stroke 2 years ago. Need additional services? Need ECF?       Evaluation: yes

## 2019-04-05 NOTE — PROGRESS NOTES
1104     Scheduled Medications    cefTRIAXone (ROCEPHIN) IV  2 g Intravenous Q24H    amLODIPine  5 mg Oral Daily    atorvastatin  20 mg Oral Nightly    calcium elemental  500 mg Oral Daily    citalopram  10 mg Oral Daily    docusate sodium  100 mg Oral Daily    hydrochlorothiazide  12.5 mg Oral Daily    lisinopril  20 mg Oral BID    Magnesium Oxide  250 mg Oral Daily    metoprolol tartrate  50 mg Oral BID    insulin glargine  0.25 Units/kg Subcutaneous Nightly    insulin lispro  0-6 Units Subcutaneous TID WC    insulin lispro  0-3 Units Subcutaneous Nightly    enoxaparin  40 mg Subcutaneous BID     PRN Meds: acetaminophen, glucose, dextrose, glucagon (rDNA), dextrose, ondansetron    Ins and outs:      Intake/Output Summary (Last 24 hours) at 4/5/2019 1541  Last data filed at 4/5/2019 1425  Gross per 24 hour   Intake 2518 ml   Output 825 ml   Net 1693 ml       Physical Examination     BP (!) 152/70   Pulse 68   Temp 97.9 °F (36.6 °C) (Oral)   Resp 18   Ht 5' (1.524 m)   Wt 213 lb 14.4 oz (97 kg) Comment: pts  states he thinks she weighs less, will zero bed next time pt is out of it  LMP  (LMP Unknown) Comment: post menopase  SpO2 98%   Breastfeeding? No   BMI 41.77 kg/m²     General appearance: No apparent distress. Obese  HEENT: Extraocular motion intact. Oral muscosadry  Neck: Supple  Respiratory:  CTA bilaterally except for decreased breath sounds at the bilateral lung bases   Cardiovascular: RRR with normal S1/S2. 2/6 YENY, no rubs or gallops. Abdomen: Soft, non-tender, non-distended with normal bowel sounds. Musculoskeletal: Patient is moving extremities x 4 spontaneously  Neurologic: Grossly non focal. CN: II-XII intact  Psychiatric: Alert and oriented  Vascular: Dorsalis pedis palpable bilaterally. Radial pulses palpable bilaterally. Mild swelling at the left hand in the area of the IV site  Skin:  No visible rashes or lesions.     Labs     Recent Labs     04/04/19 2127   WBC 9. 3   HGB 10.0*   HCT 32.4*        Recent Labs     04/04/19 2127      K 4.4      CO2 23   BUN 3*   CREATININE 0.4   CALCIUM 8.7     Recent Labs     04/04/19 2127   AST 6   ALT <5*   BILIDIR <0.2   BILITOT 0.5   ALKPHOS 94     No results for input(s): INR in the last 72 hours. No results for input(s): Selena Lacrosse in the last 72 hours. Urinalysis:    No results found for: NITRU, WBCUA, BACTERIA, RBCUA, BLOODU, SPECGRAV, GLUCOSEU    Diagnostic imaging/procedures     Mri Comparison Of Outside Films    Result Date: 4/5/2019  Radiology exam is complete. No Radiologist dictation. Please follow up with ordering provider. Ct Comparison Of Outside Films    Result Date: 4/5/2019  Radiology exam is complete. No Radiologist dictation. Please follow up with ordering provider. DVT prophylaxis: ? Lovenox                                 ? SCDs                                 ? SQ Heparin                                 ? Encourage ambulation           ? Already on Anticoagulation     Disposition:    ? Home       ? TCU       ? Rehab       ? Psych       ? SNF       ? [place]       ?  Other-

## 2019-04-05 NOTE — PLAN OF CARE
Problem: Risk for Impaired Skin Integrity  Goal: Tissue integrity - skin and mucous membranes  Description  Structural intactness and normal physiological function of skin and  mucous membranes. Outcome: Ongoing  Note:   Pt was admitted with coccyx ulcer and under abdominal folds and breasts are red these areas are being treated     Problem: Falls - Risk of:  Goal: Will remain free from falls  Description  Will remain free from falls  Outcome: Met This Shift  Note:   Patient free from falls this shift. Patient fall risk r/t. Continues on falling star program, siderails upx2-3, bed alarm on, call light in reach, bed in low and locked position. Hourly checks preformed, potty, position, pain, pathway and possessions assessed. Continuing to monitor. Problem: Falls - Risk of:  Goal: Absence of physical injury  Description  Absence of physical injury  Outcome: Met This Shift  Note:   Patient free from injury/harm this shift. Complying well with medical devices and following safety precautions. Fall risk continues to be assessed. Fall precautions in place, 5 P's used to provide safe environment. Bed in low and locked position, call light in reach, side rails upx2-3. Needs being met. Continuing to monitor. Problem: Daily Care:  Goal: Daily care needs are met  Description  Daily care needs are met  Outcome: Met This Shift  Note:   Patient assessed as independent complete assist to accomplish ADLs. Patient voicing needs appropriately. Encouraging and promoting as much  independence as possible per patient ability and assisting with ADLS and hygiene needs as needed. Skin and mucous membranes appropriate. Continuing to assess daily care needs.       Problem: Pain:  Goal: Patient's pain/discomfort is manageable  Description  Patient's pain/discomfort is manageable  Outcome: Met This Shift  Note:   Pt has not voiced any pain so far this shift     Problem: Discharge Planning:  Goal: Patients continuum of care needs are met  Description  Patients continuum of care needs are met  Outcome: Met This Shift  Note:   Pt will be discharged home with spouse   Care plan reviewed with patient and spouse. Patient and spouse verbalize understanding of the plan of care and contribute to goal setting.

## 2019-04-05 NOTE — PROGRESS NOTES
Nutrition Assessment    Type and Reason for Visit: Initial, Positive Nutrition Screen, Consult(poor po/appetite, unplanned weight loss)    Nutrition Recommendations:   Consider MVI as able to tolerate. MD to advise, No BM x 1 week. Send ensure clear TID. Consider swallow eval if swallowing difficulty arises. ( Hx Dysphagia/CVA)   Nutrition Assessment:  Pt. moderately malnourished AEB mild fat & muscle loss. At risk for further nutrition compromise r/t constipation , poor intake x 1 month per pt. statement, osteomyelitis, Hx: CVA, DM II, Dysphagia, CKD, GERD, HTN. Will send ensure clear TID as tolerated. Alerted RN pt. nauseated. Meds: Oscal , Colace, Lantus, humalog, Zofran. Malnutrition Assessment:  · Malnutrition Status: Meets the criteria for moderate malnutrition  · Context: Acute illness or injury  · Findings of the 6 clinical characteristics of malnutrition (Minimum of 2 out of 6 clinical characteristics is required to make the diagnosis of moderate or severe Protein Calorie Malnutrition based on AND/ASPEN Guidelines):  1. Energy Intake-Less than or equal to 50% of estimated energy requirement, Greater than or equal to 1 month    2. Weight Loss-2% loss or greater, (6 weeks per pt. report)  3. Fat Loss-Mild subcutaneous fat loss, Orbital  4. Muscle Loss-Mild muscle mass loss, Temples (temporalis muscle)    Nutrition Risk Level: High    Nutrient Needs:  · Estimated Daily Total Kcal: ~1455kcals (15kcals/kgm admit wt. 97kgm)  · Estimated Daily Protein (g): >90 grams (> 2 grams protien/kgm IBW 45kgm)    Nutrition Diagnosis:   · Problem: Moderate malnutrition, In context of acute illness or injury  · Etiology: related to Alteration in GI function     Signs and symptoms:  as evidenced by Weight loss, Intake 0-25%, Mild muscle loss, Mild loss of subcutaneous fat    Objective Information:  · Nutrition-Focused Physical Findings: Pt seen, states nauseated ( I alerted RN).  Poor po atleast 1 month, No

## 2019-04-06 LAB
GLUCOSE BLD-MCNC: 122 MG/DL (ref 70–108)
GLUCOSE BLD-MCNC: 78 MG/DL (ref 70–108)
GLUCOSE BLD-MCNC: 78 MG/DL (ref 70–108)
GLUCOSE BLD-MCNC: 83 MG/DL (ref 70–108)
GLUCOSE BLD-MCNC: 99 MG/DL (ref 70–108)

## 2019-04-06 PROCEDURE — 99232 SBSQ HOSP IP/OBS MODERATE 35: CPT | Performed by: INTERNAL MEDICINE

## 2019-04-06 PROCEDURE — 1200000003 HC TELEMETRY R&B

## 2019-04-06 PROCEDURE — 6360000002 HC RX W HCPCS: Performed by: PHYSICIAN ASSISTANT

## 2019-04-06 PROCEDURE — 6370000000 HC RX 637 (ALT 250 FOR IP): Performed by: PHYSICIAN ASSISTANT

## 2019-04-06 PROCEDURE — 2580000003 HC RX 258: Performed by: INTERNAL MEDICINE

## 2019-04-06 PROCEDURE — 2580000003 HC RX 258: Performed by: PHYSICIAN ASSISTANT

## 2019-04-06 PROCEDURE — 97110 THERAPEUTIC EXERCISES: CPT

## 2019-04-06 PROCEDURE — 2500000003 HC RX 250 WO HCPCS: Performed by: INTERNAL MEDICINE

## 2019-04-06 PROCEDURE — 82948 REAGENT STRIP/BLOOD GLUCOSE: CPT

## 2019-04-06 PROCEDURE — 6370000000 HC RX 637 (ALT 250 FOR IP): Performed by: INTERNAL MEDICINE

## 2019-04-06 PROCEDURE — 97165 OT EVAL LOW COMPLEX 30 MIN: CPT

## 2019-04-06 PROCEDURE — 6360000002 HC RX W HCPCS: Performed by: INTERNAL MEDICINE

## 2019-04-06 PROCEDURE — 97161 PT EVAL LOW COMPLEX 20 MIN: CPT

## 2019-04-06 RX ORDER — ONDANSETRON 2 MG/ML
4 INJECTION INTRAMUSCULAR; INTRAVENOUS EVERY 6 HOURS PRN
Status: DISCONTINUED | OUTPATIENT
Start: 2019-04-06 | End: 2019-04-07

## 2019-04-06 RX ORDER — DEXTROSE AND SODIUM CHLORIDE 5; .45 G/100ML; G/100ML
INJECTION, SOLUTION INTRAVENOUS CONTINUOUS
Status: DISCONTINUED | OUTPATIENT
Start: 2019-04-06 | End: 2019-04-07

## 2019-04-06 RX ORDER — PROMETHAZINE HYDROCHLORIDE 25 MG/ML
12.5 INJECTION, SOLUTION INTRAMUSCULAR; INTRAVENOUS EVERY 6 HOURS PRN
Status: DISCONTINUED | OUTPATIENT
Start: 2019-04-06 | End: 2019-04-15 | Stop reason: HOSPADM

## 2019-04-06 RX ORDER — PROMETHAZINE HYDROCHLORIDE 25 MG/ML
12.5 INJECTION, SOLUTION INTRAMUSCULAR; INTRAVENOUS ONCE
Status: DISCONTINUED | OUTPATIENT
Start: 2019-04-06 | End: 2019-04-07

## 2019-04-06 RX ORDER — PANTOPRAZOLE SODIUM 40 MG/1
40 TABLET, DELAYED RELEASE ORAL
Status: DISCONTINUED | OUTPATIENT
Start: 2019-04-07 | End: 2019-04-15 | Stop reason: HOSPADM

## 2019-04-06 RX ADMIN — METOPROLOL TARTRATE 50 MG: 50 TABLET, FILM COATED ORAL at 08:05

## 2019-04-06 RX ADMIN — MICONAZOLE NITRATE: 2 POWDER TOPICAL at 16:00

## 2019-04-06 RX ADMIN — HYDROCHLOROTHIAZIDE 12.5 MG: 12.5 CAPSULE ORAL at 08:05

## 2019-04-06 RX ADMIN — DOCUSATE SODIUM 100 MG: 100 CAPSULE, LIQUID FILLED ORAL at 08:09

## 2019-04-06 RX ADMIN — METOPROLOL TARTRATE 50 MG: 50 TABLET, FILM COATED ORAL at 00:36

## 2019-04-06 RX ADMIN — METOPROLOL TARTRATE 50 MG: 50 TABLET, FILM COATED ORAL at 22:21

## 2019-04-06 RX ADMIN — SODIUM CHLORIDE: 9 INJECTION, SOLUTION INTRAVENOUS at 05:44

## 2019-04-06 RX ADMIN — LISINOPRIL 20 MG: 20 TABLET ORAL at 22:20

## 2019-04-06 RX ADMIN — POLYETHYLENE GLYCOL 3350 17 G: 17 POWDER, FOR SOLUTION ORAL at 08:09

## 2019-04-06 RX ADMIN — ONDANSETRON 4 MG: 2 INJECTION INTRAMUSCULAR; INTRAVENOUS at 00:32

## 2019-04-06 RX ADMIN — HYDROCHLOROTHIAZIDE 12.5 MG: 12.5 CAPSULE ORAL at 00:36

## 2019-04-06 RX ADMIN — ATORVASTATIN CALCIUM 20 MG: 20 TABLET, FILM COATED ORAL at 22:20

## 2019-04-06 RX ADMIN — MICONAZOLE NITRATE: 2 POWDER TOPICAL at 22:21

## 2019-04-06 RX ADMIN — DEXTROSE AND SODIUM CHLORIDE: 5; 450 INJECTION, SOLUTION INTRAVENOUS at 09:07

## 2019-04-06 RX ADMIN — CALCIUM 500 MG: 500 TABLET ORAL at 08:05

## 2019-04-06 RX ADMIN — LISINOPRIL 20 MG: 20 TABLET ORAL at 00:36

## 2019-04-06 RX ADMIN — CITALOPRAM 10 MG: 20 TABLET, FILM COATED ORAL at 08:05

## 2019-04-06 RX ADMIN — ONDANSETRON 4 MG: 2 INJECTION INTRAMUSCULAR; INTRAVENOUS at 08:13

## 2019-04-06 RX ADMIN — Medication 1 CAPSULE: at 08:06

## 2019-04-06 RX ADMIN — AMLODIPINE BESYLATE 5 MG: 5 TABLET ORAL at 08:05

## 2019-04-06 RX ADMIN — LISINOPRIL 20 MG: 20 TABLET ORAL at 08:05

## 2019-04-06 RX ADMIN — CEFTRIAXONE SODIUM 2 G: 2 INJECTION, POWDER, FOR SOLUTION INTRAMUSCULAR; INTRAVENOUS at 12:04

## 2019-04-06 ASSESSMENT — PAIN SCALES - GENERAL
PAINLEVEL_OUTOF10: 0

## 2019-04-06 NOTE — PLAN OF CARE
Problem: Risk for Impaired Skin Integrity  Goal: Tissue integrity - skin and mucous membranes  Description  Structural intactness and normal physiological function of skin and  mucous membranes. 4/6/2019 1354 by Nelda Dennis RN  Outcome: Ongoing  Note:   Groin, abdominal folds and farida-area, skin intact, microgard powder applied after each void or bm, with depends reaplied  4/6/2019 0319 by Marie Mejia RN  Outcome: Ongoing  Note:   Pt has macerated skin in her groin, abdominal folds, and buttocks. Problem: Falls - Risk of:  Goal: Will remain free from falls  Description  Will remain free from falls  4/6/2019 1354 by Nelda Dennis RN  Outcome: Ongoing  Note:   Patient uses call light for assistance, bed alarm in place, side rails up x3  4/6/2019 0319 by Marie Mejia RN  Outcome: Ongoing  Note:   No falls this shift. Pt using call light appropriately to call for assistance with ambulation to the bathroom and to chair. Pt is also compliant with use of non-skid slippers. Pt reports understanding of fall prevention when discussed. Problem: Falls - Risk of:  Goal: Absence of physical injury  Description  Absence of physical injury  4/6/2019 0319 by Marie Mejia RN  Outcome: Ongoing  Note:   Pt free from injury this shift.       Problem: Daily Care:  Goal: Daily care needs are met  Description  Daily care needs are met  4/6/2019 1354 by Nelda Dennis RN  Outcome: Ongoing  4/6/2019 0319 by Marie Mejia RN  Outcome: Ongoing  Note:   Pt needs assistance with ADLs     Problem: Daily Care:  Goal: Daily care needs are met  Description  Daily care needs are met  4/6/2019 1354 by Nelda Dennis RN  Outcome: Ongoing  4/6/2019 0319 by Marie Mejia RN  Outcome: Ongoing  Note:   Pt needs assistance with ADLs     Problem: Pain:  Goal: Patient's pain/discomfort is manageable  Description  Patient's pain/discomfort is manageable  4/6/2019 1354 by Nelda Dennis RN  Outcome: Ongoing  4/6/2019 7717 by Precious Malone RN  Outcome: Ongoing  Note:   Pt has denied pain this shift. Pt pain goal is 0/10 no pain. Problem: Discharge Planning:  Goal: Patients continuum of care needs are met  Description  Patients continuum of care needs are met  4/6/2019 1354 by Deyanira Munroe RN  Outcome: Ongoing  4/6/2019 0319 by Precious Malone RN  Outcome: Ongoing  Problem: DISCHARGE BARRIERS  Goal: Patient's continuum of care needs are met  4/6/2019 1354 by Deyanira Munroe RN  Outcome: Ongoing  Note:   Patient and family included in discharge planning, patient receptive and demonstrates understanding of expectations while at hospital and upon discharge   4/6/2019 0319 by Precious Malone RN  Outcome: Ongoing  Note:   Pt returning home with [facility]     Problem: GI  Goal: No bowel complications  3/9/1111 5664 by Deyanira Munroe RN  Outcome: Ongoing  Note:   Large incontinent bm, watery green, x2,see above for skin care. Patient has occassional nausea that improves with zofran  4/6/2019 0319 by Precious Malone RN  Outcome: Ongoing  Note:   Pt has had incontinent stools this shift. Problem: DISCHARGE BARRIERS  Goal: Patient's continuum of care needs are met  4/6/2019 1354 by Deyanira Munroe RN  Outcome: Ongoing  Note:   Patient and family included in discharge planning, patient receptive and demonstrates understanding of expectations while at hospital and upon discharge   4/6/2019 0319 by Precious Malone RN  Outcome: Ongoing  Note:   Pt returning home with [facility]  Care plan reviewed with patient and . Patient and  verbalize understanding of the plan of care and contribute to goal setting.         Note:   Pt planning home at discharge with home health

## 2019-04-06 NOTE — PROGRESS NOTES
Progress note: Infectious diseases    Patient - Booker Garrison,  Age - 72 y.o.    - 1945      Room Number - 7K-20/020-A   MRN -  [id number]   Acct # - [de-identified]  Date of Admission -  2019  5:20 PM    SUBJECTIVE:   She has no new complaints. OBJECTIVE   VITALS    height is 5' (1.524 m) and weight is 213 lb 14.4 oz (97 kg). Her oral temperature is 98.6 °F (37 °C). Her blood pressure is 150/55 (abnormal) and her pulse is 76. Her respiration is 18 and oxygen saturation is 93%. Wt Readings from Last 3 Encounters:   19 213 lb 14.4 oz (97 kg)       I/O (24 Hours)    Intake/Output Summary (Last 24 hours) at 2019 1458  Last data filed at 2019 1406  Gross per 24 hour   Intake 2515 ml   Output 250 ml   Net 2265 ml       General Appearance  Awake, alert, oriented,  not  In acute distress  HEENT - normocephalic, atraumatic, pink conjunctiva,  anicteric sclera  Neck - Supple, no mass  Lungs -  Bilateral  air entry, diminished breath sounds.   Cardiovascular - Heart sounds are normal.    Abdomen - soft, not distended, nontender,   Neurologic - right-sided weakness  Skin - No bruising or bleeding  Extremities - + edema, no cyanosis, clubbing     MEDICATIONS:      [START ON 2019] pantoprazole  40 mg Oral QAM AC    promethazine  12.5 mg Intramuscular Once    miconazole   Topical BID    cefTRIAXone (ROCEPHIN) IV  2 g Intravenous Q24H    polyethylene glycol  17 g Oral Daily    lactobacillus  1 capsule Oral Daily with breakfast    amLODIPine  5 mg Oral Daily    atorvastatin  20 mg Oral Nightly    calcium elemental  500 mg Oral Daily    citalopram  10 mg Oral Daily    docusate sodium  100 mg Oral Daily    hydrochlorothiazide  12.5 mg Oral Daily    lisinopril  20 mg Oral BID    Magnesium Oxide  250 mg Oral Daily    metoprolol tartrate  50 mg Oral BID    insulin glargine  0.25 Units/kg Subcutaneous  Essential hypertension I10    Moderate malnutrition (HCC) E44.0    Examination for normal comparison for clinical research Z00.6    Discitis M46.40    Oliguria R34    Morbid obesity (HCC) E66.01    History of CVA (cerebrovascular accident) Z86.73         ASSESSMENT/PLAN   · Discitis/osteomyelitis of the thoracic spine: Awaiting biopsy  · History of recurrent urinary tract infection with recent history of Bacteremia due to Proteus. · History of CVA with right-sided weakness  · Limited mobility with deconditioning  · Continue current treatment. Will plan discharge and patient on extended care facility to continue IV antibiotics.       Alex Alexandra MD, [facility] 4/6/2019 2:58 PM

## 2019-04-06 NOTE — PROGRESS NOTES
[address]  INPATIENT OCCUPATIONAL THERAPY  [facility] ORTHOPEDICS 7K  EVALUATION    Time:  Time In: 7673  Time Out: 1350  Timed Code Treatment Minutes: 0 Minutes  Minutes: 14          Date: 2019  Patient Name: Maryse Alexis,   Gender: female      MRN: [id number]  : 1945  (72 y.o.)  Referring Practitioner: Chano Sim PA-C  Diagnosis: Osteomylitis  Additional Pertinent Hx: Patient is a 72year old female transferred from [place] on 19 for T8-T9 discitis.  also reported poor appetite, nausea, vomiting and increased weakness over the past month. Patient has a history of a CVA 2 years ago with R sided deficits.       Restrictions/Precautions:  General Precautions, Fall Risk                    Other position/activity restrictions: hx of CVA with R sided deficits        Past Medical History:   Diagnosis Date    Cerebral artery occlusion with cerebral infarction ([address]     Right sided weakness    Chronic kidney disease     Diabetes mellitus ([address]     Discitis     GERD (gastroesophageal reflux disease)     Dysphagia/GERD    Hypertension      Past Surgical History:   Procedure Laterality Date    HYSTERECTOMY             Subjective  Chart Reviewed: Yes  Patient assessed for rehabilitation services?: Yes  Family / Caregiver Present: Yes    Subjective: Cooperative initially    General:  Overall Orientation Status: Within Functional Limits    Vision: Within Functional Limits    Hearing: Within functional limits         Pain:  Pain Assessment  Patient Currently in Pain: Denies       Social/Functional History:  Lives With: Spouse  Type of Home: House  Home Layout: One level  Home Access: Ramped entrance  Home Equipment: [employer], Lift chair     Bathroom Shower/Tub: Walk-in shower(sponge bathes since stroke)  Bathroom Toilet: (uses briefs and spouse changes the briefs)  Bathroom Equipment: Commode    Receives Help From: Family  ADL Assistance: Needs assistance  Homemaking Assistance: Needs assistance  Homemaking Responsibilities: No    Ambulation Assistance: (non-ambulatory)  Transfer Assistance: Needs assistance    Active : No  Occupation: Retired  Additional Comments: After patient's CVA in 2017, she performed therapy at [facility]. Each time patient D/C'd therapy was due to near falls with staff, per . Since return home, patient's  has been utiliizing the [employer] lift to get her from bed to recliner or to wheelchair when leaving the home. States she utilizes a bedpan or adult diapers and her  assists with all sponge baths.  or sister in law is there 24/7 with patient. Objective        Overall Cognitive Status: Exceptions                                     LUE AROM (degrees)  LUE AROM : WFL          RUE AROM (degrees)  RUE AROM : Exceptions  RUE General AROM: limited ROM within all joints       LUE Strength  LUE Strength Comment: 4-/5        ADL  Additional Comments: Refused     Bed mobility  Comment: Refused    Transfers  Sit to stand: Unable to assess  Stand to sit: Unable to assess    Balance  Sitting Balance: Unable to assess(comment)  Standing Balance: Unable to assess(comment)     Assessment:  Assessment: Per conversation with PT (Padmaja Elliott) this AM, pt sat EOB with max difficulty. Per PT, spouse and pt very excited about continued therapy. OTR attempted to initiate tx session this PM and pt adamantly refusing, pt stating that she wanted to get better and stronge and be able to walk and do what she was doing prior to her stroke. OTR educating pt that she would need to put the work and effort in to get stronger. Spouse then stating, \"It seems like it is a lot of work for very little gain, I don't think it is worth it. \" Educated pt and spouse on choice for having therapy. Pt stating, she does not want to continue therapy services, spouse agrees.  Defer further OT per pt and family

## 2019-04-06 NOTE — PROGRESS NOTES
[address] [address]  INPATIENT PHYSICAL THERAPY  EVALUATION  [facility] ORTHOPEDICS 7K - 7K-20/020-A    Time In: 7623  Time Out: 1026  Timed Code Treatment Minutes: 8 Minutes  Minutes: 28          Date: 2019  Patient Name: Ronald Wagner,  Gender:  female        MRN: [id number]  : 1945  (77 y.o.)      Referring Practitioner: Ranjith Villareal PA-C  Diagnosis: osteomyelitis  Additional Pertinent Hx: Patient is a 72year old female transferred from [place] on 19 for T8-T9 discitis.  also reported poor appetite, nausea, vomiting and increased weakness over the past month. Patient has a history of a CVA 2 years ago with R sided deficits. Past Medical History:   Diagnosis Date    Cerebral artery occlusion with cerebral infarction ([facility] [address]     Right sided weakness    Chronic kidney disease     Diabetes mellitus ([facility] [address]     Discitis     GERD (gastroesophageal reflux disease)     Dysphagia/GERD    Hypertension      Past Surgical History:   Procedure Laterality Date    HYSTERECTOMY         Restrictions/Precautions:  General Precautions, Fall Risk        Other position/activity restrictions: hx of CVA with R sided deficits        Subjective:  Chart Reviewed: Yes  Patient assessed for rehabilitation services?: Yes  Family / Caregiver Present: Yes()  Subjective: RN approved session, patient resting in bed upon arrival, agreeable to therapy. General:  Overall Orientation Status: Impaired  Orientation Level: Oriented to place, Oriented to situation, Oriented to person  Follows Commands: Impaired(requires additional cueing for command follow through)    Vision: Within Functional Limits    Hearing: Within functional limits         Pain:   .      Pre Treatment Pain Screening  Pain at present: 0  Scale Used: Numeric Score  Intervention List: Patient able to continue with treatment    Social/Functional History:    Lives With: Spouse  Type of Home: House  Home Layout: One level  Home Access: Ramped with back to bed)  Scooting: Dependent/Total    Transfers  Comment: unable to perform     Exercises:  Comments: in bed: AAROM to PROM: ankle pumps, heel slides, hip abduction, SAQ x10 bilaterally for improved LE strength for functional mobility             Activity Tolerance:  Activity Tolerance: Patient limited by endurance; Patient limited by fatigue    Treatment Initiated: see above     Assessment: Body structures, Functions, Activity limitations: Decreased functional mobility , Decreased safe awareness, Decreased endurance, Decreased strength, Decreased balance  Assessment: Patient tolerated session poorly as she has very minimal strength and endurance towards mobility. Patient required MAX asisstance from 2 persons for bed mobility and was only able to sit EOB for 2 minutes before requiring break. Due to PMH of CVA, she requires futher physical therapy to improve strength, independence and decrease assistance required from caregivers. Prognosis: Fair    Clinical Presentation: Low - Stable and Uncomplicated: Patient tolerated session poorly as she has very minimal strength and endurance towards mobility. Patient required MAX asisstance from 2 persons for bed mobility and was only able to sit EOB for 2 minutes before requiring break. Due to PMH of CVA, she requires futher physical therapy to improve strength, independence and decrease assistance required from caregivers. Decision Making: High Complexitybased on patient assessment and decision making process of determining plan of care and establishing reasonable expectations for measurable functional outcomes    REQUIRES PT FOLLOW UP: Yes    Discharge Recommendations:  Discharge Recommendations: Continue to assess pending progress, Subacute/Skilled Nursing Facility, ECF with PT    Patient Education:  Patient Education: POC, need for continued therapy     Equipment Recommendations:  Equipment Needed: No(continue to monitor)    Safety:  Type of devices:  All fall risk precautions in place, Call light within reach, Left in bed  Restraints  Initially in place: No    Plan:  Times per week: 3-5xGM  Times per day: Daily  Specific instructions for Next Treatment: bed mobility, strength, ROM, sitting tolerance   Current Treatment Recommendations: Strengthening, Balance Training, Endurance Training    Goals:  Patient goals : to get better  Short term goals  Time Frame for Short term goals: 2 weeks  Short term goal 1: Patient will be able to roll L and R with MODx1 to decrease risk of pressure ulcers  Short term goal 2: Patient will be able to perform supine<>sit with MAXx1 in order to sit on EOB  Short term goal 3: Patient will be able to sit and maintain upright positioning for 5 minutes to improve tolerance to upright   Short term goal 4: Patient will display F- balance with sitting activities. Long term goals  Time Frame for Long term goals : NA due to short ELOS    Evaluation Complexity: Based on the findings of patient history, examination, clinical presentation, and decision making during this evaluation, the evaluation of Gonzalez Amador  is of low complexity.             AM-PAC Inpatient Mobility without Stair Climbing Raw Score : 7  AM-PAC Inpatient without Stair Climbing T-Scale Score : 28.66  Mobility Inpatient CMS 0-100% Score: 86.29  Mobility Inpatient without Stair CMS G-Code Modifier : CM

## 2019-04-06 NOTE — PROGRESS NOTES
Hospitalist Progress Note    Patient:  Paul Webster  YOB: 1945  MRN: [id number]   PCP: No primary care provider on file. Acct: [de-identified]  Unit/Bed: [address] [place] [address]    Date of Admission: 4/4/2019      ASSESSMENT     1. Discitis/osteomyelitis of the lower thoracic spine   1. Afebrile w/o leukocytosis on presentation  2. MRI at [facility] showed discitis at T8-T9  3. Dr. Josiah Kidd, ID on board, appreciate recommendations, recommending biopsy  To be performed on 4/8  4. Patient started on Zosyn and vancomcyin empirically, however discontinued per Dr. Josiah Kidd, now on Ceftriaxone only as of 4/5  5. Enoxaparin discontinued on 4/5 as patient may be going for biopsy on Monday 4/8  6. Blood cx negative to date  2. Oliguria, urinary retention  1. Had >600 mL out last night and only 150 mL so far today  2. Continue IV fluids at 40 mL/hr. 3. Bladder scan showed 364 mL of fluid with signs of urinary retention  4. Has recurrent UTIs, UA @ [facility] for nitrites, UA here unremarkable for signs of infection  5. External catheter in place  3. Chronic co-morbidities  1. DM Type 2, relatively controlled, HgbA1C 6.6.  2. Essential hypertension  3. Morbid obesity  4. History of CVA  4. Hypoglycemia  1. Place on D5/ 1/2 NS and continue to monitor  2. Check prealbumin to determine level of nutrition  5. Nausea, constipation  1. Will schedule Zofran  2. Constipation relieved with suppository, now on Miralax and docusate daily  3. Will add promethazine as well today    PLAN     1. Continue antibiotics. 2. Will continue to monitor area of swelling at IV site at left upper extremity  3. Continue to monitor urine output  4. Continue to monitor BP  5. Decrease IV fluids to D5 1/2 NS @40 mL daily  6. Repeat bladder scan today  7. Continue to schedule Zofran, promethazine added   8.  Planning for biopsy on Monday    Anticipated Discharge in :  2 to 6 days    Code Status: Full Code    Electronically signed by Ángela Ferreira Chato Louise MD on 4/6/2019 at 8:59 AM      Chief Complaint     T8-T9 discitis    SUBJECTIVE     The patient is a 72 y.o. Dolly Marc yo female who was admitted on 4/4/2019 as [facility]. - patient still has ongoing nausea despite BM yesterday  - no vomiting.   - BP slightly high up to 206/88 overnight, now improved to 167/88 this am  - Was hypoglycemic overnight      OBJECTIVE     Medications:  Reviewed    Infusion Medications    dextrose 5 % and 0.45 % NaCl      dextrose       Scheduled Medications    [START ON 4/7/2019] pantoprazole  40 mg Oral QAM AC    promethazine  12.5 mg Intramuscular Once    cefTRIAXone (ROCEPHIN) IV  2 g Intravenous Q24H    polyethylene glycol  17 g Oral Daily    lactobacillus  1 capsule Oral Daily with breakfast    amLODIPine  5 mg Oral Daily    atorvastatin  20 mg Oral Nightly    calcium elemental  500 mg Oral Daily    citalopram  10 mg Oral Daily    docusate sodium  100 mg Oral Daily    hydrochlorothiazide  12.5 mg Oral Daily    lisinopril  20 mg Oral BID    Magnesium Oxide  250 mg Oral Daily    metoprolol tartrate  50 mg Oral BID    insulin glargine  0.25 Units/kg Subcutaneous Nightly    insulin lispro  0-6 Units Subcutaneous TID WC    insulin lispro  0-3 Units Subcutaneous Nightly     PRN Meds: ondansetron, promethazine, acetaminophen, glucose, dextrose, glucagon (rDNA), dextrose, ondansetron    Ins and outs:      Intake/Output Summary (Last 24 hours) at 4/6/2019 0859  Last data filed at 4/6/2019 0539  Gross per 24 hour   Intake 2763 ml   Output 400 ml   Net 2363 ml       Physical Examination     BP (!) 167/67   Pulse 70   Temp 98.3 °F (36.8 °C) (Oral)   Resp 18   Ht 5' (1.524 m)   Wt 213 lb 14.4 oz (97 kg) Comment: pts  states he thinks she weighs less, will zero bed next time pt is out of it  LMP  (LMP Unknown) Comment: post menopase  SpO2 93%   Breastfeeding? No   BMI 41.77 kg/m²     General appearance: No apparent distress. Obese. Appears ill this am  HEENT: Extraocular motion intact. Oral muscosadry  Neck: Supple  Respiratory:  CTA bilaterally except for decreased breath sounds at the bilateral lung bases   Cardiovascular: RRR with normal S1/S2. 2/6 YENY, no rubs or gallops. Abdomen: Soft, non-tender, non-distended with normal bowel sounds. Musculoskeletal: Patient is moving extremities x 4 spontaneously  Neurologic: Grossly non focal. CN: II-XII intact  Psychiatric: Alert and oriented  Vascular: Dorsalis pedis palpable bilaterally. Radial pulses palpable bilaterally. Mild swelling at the left hand in the area of the IV site  Skin:  No visible rashes or lesions. Labs     Recent Labs     04/04/19 2127   WBC 9.3   HGB 10.0*   HCT 32.4*        Recent Labs     04/04/19 2127      K 4.4      CO2 23   BUN 3*   CREATININE 0.4   CALCIUM 8.7     Recent Labs     04/04/19 2127   AST 6   ALT <5*   BILIDIR <0.2   BILITOT 0.5   ALKPHOS 94     No results for input(s): INR in the last 72 hours. No results for input(s): Madalynn Baldev in the last 72 hours. Urinalysis:      Lab Results   Component Value Date    NITRU NEGATIVE 04/05/2019    BLOODU NEGATIVE 04/05/2019    GLUCOSEU NEGATIVE 04/05/2019       Diagnostic imaging/procedures     Mri Comparison Of Outside Films    Result Date: 4/5/2019  Radiology exam is complete. No Radiologist dictation. Please follow up with ordering provider. Ct Comparison Of Outside Films    Result Date: 4/5/2019  Radiology exam is complete. No Radiologist dictation. Please follow up with ordering provider. DVT prophylaxis: ? Lovenox                                 ? SCDs                                 ? SQ Heparin                                 ? Encourage ambulation           ? Already on Anticoagulation     Disposition:    ? Home       ? TCU       ? Rehab       ? Psych       ? SNF       ? [facility]       ?  Other-

## 2019-04-07 PROBLEM — E43 SEVERE PROTEIN-CALORIE MALNUTRITION (HCC): Status: ACTIVE | Noted: 2019-04-05

## 2019-04-07 LAB
ANION GAP SERPL CALCULATED.3IONS-SCNC: 11 MEQ/L (ref 8–16)
BUN BLDV-MCNC: < 2 MG/DL (ref 7–22)
CALCIUM SERPL-MCNC: 8 MG/DL (ref 8.5–10.5)
CHLORIDE BLD-SCNC: 98 MEQ/L (ref 98–111)
CO2: 22 MEQ/L (ref 23–33)
CREAT SERPL-MCNC: 0.3 MG/DL (ref 0.4–1.2)
ERYTHROCYTE [DISTWIDTH] IN BLOOD BY AUTOMATED COUNT: 15.2 % (ref 11.5–14.5)
ERYTHROCYTE [DISTWIDTH] IN BLOOD BY AUTOMATED COUNT: 45.6 FL (ref 35–45)
GFR SERPL CREATININE-BSD FRML MDRD: > 90 ML/MIN/1.73M2
GLUCOSE BLD-MCNC: 113 MG/DL (ref 70–108)
GLUCOSE BLD-MCNC: 130 MG/DL (ref 70–108)
GLUCOSE BLD-MCNC: 131 MG/DL (ref 70–108)
GLUCOSE BLD-MCNC: 131 MG/DL (ref 70–108)
GLUCOSE BLD-MCNC: 146 MG/DL (ref 70–108)
HCT VFR BLD CALC: 34.8 % (ref 37–47)
HEMOGLOBIN: 10.9 GM/DL (ref 12–16)
INR BLD: 1.56 (ref 0.85–1.13)
MCH RBC QN AUTO: 25.9 PG (ref 26–33)
MCHC RBC AUTO-ENTMCNC: 31.3 GM/DL (ref 32.2–35.5)
MCV RBC AUTO: 82.7 FL (ref 81–99)
PLATELET # BLD: 278 THOU/MM3 (ref 130–400)
PMV BLD AUTO: 9.5 FL (ref 9.4–12.4)
POTASSIUM SERPL-SCNC: 3.3 MEQ/L (ref 3.5–5.2)
PREALBUMIN: 5.7 MG/DL (ref 20–40)
RBC # BLD: 4.21 MILL/MM3 (ref 4.2–5.4)
SODIUM BLD-SCNC: 131 MEQ/L (ref 135–145)
WBC # BLD: 7.8 THOU/MM3 (ref 4.8–10.8)

## 2019-04-07 PROCEDURE — 6360000002 HC RX W HCPCS: Performed by: INTERNAL MEDICINE

## 2019-04-07 PROCEDURE — 6370000000 HC RX 637 (ALT 250 FOR IP): Performed by: INTERNAL MEDICINE

## 2019-04-07 PROCEDURE — 2580000003 HC RX 258: Performed by: RADIOLOGY

## 2019-04-07 PROCEDURE — 2580000003 HC RX 258: Performed by: INTERNAL MEDICINE

## 2019-04-07 PROCEDURE — 82948 REAGENT STRIP/BLOOD GLUCOSE: CPT

## 2019-04-07 PROCEDURE — 36415 COLL VENOUS BLD VENIPUNCTURE: CPT

## 2019-04-07 PROCEDURE — 51798 US URINE CAPACITY MEASURE: CPT

## 2019-04-07 PROCEDURE — 6360000002 HC RX W HCPCS: Performed by: PHYSICIAN ASSISTANT

## 2019-04-07 PROCEDURE — 84134 ASSAY OF PREALBUMIN: CPT

## 2019-04-07 PROCEDURE — 1200000003 HC TELEMETRY R&B

## 2019-04-07 PROCEDURE — 85610 PROTHROMBIN TIME: CPT

## 2019-04-07 PROCEDURE — 6370000000 HC RX 637 (ALT 250 FOR IP): Performed by: PHYSICIAN ASSISTANT

## 2019-04-07 PROCEDURE — 85027 COMPLETE CBC AUTOMATED: CPT

## 2019-04-07 PROCEDURE — 80048 BASIC METABOLIC PNL TOTAL CA: CPT

## 2019-04-07 RX ORDER — SODIUM CHLORIDE 450 MG/100ML
INJECTION, SOLUTION INTRAVENOUS CONTINUOUS
Status: DISCONTINUED | OUTPATIENT
Start: 2019-04-07 | End: 2019-04-09

## 2019-04-07 RX ORDER — POTASSIUM CHLORIDE 7.45 MG/ML
10 INJECTION INTRAVENOUS
Status: COMPLETED | OUTPATIENT
Start: 2019-04-07 | End: 2019-04-07

## 2019-04-07 RX ORDER — DOCUSATE SODIUM 100 MG/1
100 CAPSULE, LIQUID FILLED ORAL 2 TIMES DAILY PRN
Status: DISCONTINUED | OUTPATIENT
Start: 2019-04-07 | End: 2019-04-15 | Stop reason: HOSPADM

## 2019-04-07 RX ORDER — DEXTROSE AND SODIUM CHLORIDE 5; .9 G/100ML; G/100ML
INJECTION, SOLUTION INTRAVENOUS CONTINUOUS
Status: DISCONTINUED | OUTPATIENT
Start: 2019-04-07 | End: 2019-04-10

## 2019-04-07 RX ORDER — CEFAZOLIN SODIUM 1 G/50ML
1 INJECTION, SOLUTION INTRAVENOUS
Status: DISCONTINUED | OUTPATIENT
Start: 2019-04-07 | End: 2019-04-15 | Stop reason: HOSPADM

## 2019-04-07 RX ORDER — ONDANSETRON 2 MG/ML
4 INJECTION INTRAMUSCULAR; INTRAVENOUS EVERY 4 HOURS PRN
Status: DISCONTINUED | OUTPATIENT
Start: 2019-04-07 | End: 2019-04-15 | Stop reason: HOSPADM

## 2019-04-07 RX ADMIN — CITALOPRAM 10 MG: 20 TABLET, FILM COATED ORAL at 07:33

## 2019-04-07 RX ADMIN — AMLODIPINE BESYLATE 5 MG: 5 TABLET ORAL at 05:48

## 2019-04-07 RX ADMIN — ATORVASTATIN CALCIUM 20 MG: 20 TABLET, FILM COATED ORAL at 22:28

## 2019-04-07 RX ADMIN — HYDROCHLOROTHIAZIDE 12.5 MG: 12.5 CAPSULE ORAL at 07:34

## 2019-04-07 RX ADMIN — POTASSIUM CHLORIDE 10 MEQ: 7.46 INJECTION, SOLUTION INTRAVENOUS at 15:26

## 2019-04-07 RX ADMIN — CEFTRIAXONE SODIUM 2 G: 2 INJECTION, POWDER, FOR SOLUTION INTRAMUSCULAR; INTRAVENOUS at 11:47

## 2019-04-07 RX ADMIN — LISINOPRIL 20 MG: 20 TABLET ORAL at 07:33

## 2019-04-07 RX ADMIN — PANTOPRAZOLE SODIUM 40 MG: 40 TABLET, DELAYED RELEASE ORAL at 05:49

## 2019-04-07 RX ADMIN — MICONAZOLE NITRATE: 2 POWDER TOPICAL at 22:39

## 2019-04-07 RX ADMIN — DEXTROSE AND SODIUM CHLORIDE: 5; 900 INJECTION, SOLUTION INTRAVENOUS at 13:53

## 2019-04-07 RX ADMIN — Medication 1 CAPSULE: at 07:33

## 2019-04-07 RX ADMIN — SODIUM CHLORIDE: 4.5 INJECTION, SOLUTION INTRAVENOUS at 22:28

## 2019-04-07 RX ADMIN — POTASSIUM CHLORIDE 10 MEQ: 7.46 INJECTION, SOLUTION INTRAVENOUS at 17:26

## 2019-04-07 RX ADMIN — CALCIUM 500 MG: 500 TABLET ORAL at 07:33

## 2019-04-07 RX ADMIN — MICONAZOLE NITRATE: 2 POWDER TOPICAL at 07:35

## 2019-04-07 RX ADMIN — METOPROLOL TARTRATE 50 MG: 50 TABLET, FILM COATED ORAL at 22:28

## 2019-04-07 RX ADMIN — ONDANSETRON 4 MG: 2 INJECTION INTRAMUSCULAR; INTRAVENOUS at 15:29

## 2019-04-07 RX ADMIN — LISINOPRIL 20 MG: 20 TABLET ORAL at 22:28

## 2019-04-07 RX ADMIN — INSULIN LISPRO 1 UNITS: 100 INJECTION, SOLUTION INTRAVENOUS; SUBCUTANEOUS at 11:47

## 2019-04-07 RX ADMIN — DEXTROSE AND SODIUM CHLORIDE: 5; 450 INJECTION, SOLUTION INTRAVENOUS at 04:59

## 2019-04-07 RX ADMIN — ONDANSETRON 4 MG: 2 INJECTION INTRAMUSCULAR; INTRAVENOUS at 04:59

## 2019-04-07 RX ADMIN — METOPROLOL TARTRATE 50 MG: 50 TABLET, FILM COATED ORAL at 07:33

## 2019-04-07 ASSESSMENT — PAIN SCALES - GENERAL
PAINLEVEL_OUTOF10: 0

## 2019-04-07 NOTE — PROGRESS NOTES
Patient in continent of large urine, and med sized soft stool. All skin cleansed and dried, areas of redness in groin, abdominal fold and farida area are improving, all skin intact. Post void residual by bladder scan completed, with residual of 326. Sent perfect serve message to KEVIN Jerry CNP to notify of bladder scan results.

## 2019-04-07 NOTE — PLAN OF CARE
Problem: Risk for Impaired Skin Integrity  Goal: Tissue integrity - skin and mucous membranes  Description  Structural intactness and normal physiological function of skin and  mucous membranes. Outcome: Ongoing  Note:   No new skin impairments noted. Pt understands the importance of frequent repositioning in order to prevent any skin breakdown. Patient helped to turn every two hours and as needed. Problem: Falls - Risk of:  Goal: Will remain free from falls  Description  Will remain free from falls  Outcome: Ongoing  Note:   No falls this shift. Pt using call light appropriately to call for assistance with ambulation to the bathroom and to chair. Pt is also compliant with use of non-skid slippers. Pt reports understanding of fall prevention when discussed. Goal: Absence of physical injury  Description  Absence of physical injury  Outcome: Ongoing  Note:   No falls this shift. Pt using call light appropriately to call for assistance with ambulation to the bathroom and to chair. Pt is also compliant with use of non-skid slippers. Pt reports understanding of fall prevention when discussed. Problem: Daily Care:  Goal: Daily care needs are met  Description  Daily care needs are met  Outcome: Ongoing  Note:   Patient able to perform daily cares with moderate assistance. Patient uses call light when assistance is needed. Problem: Pain:  Goal: Patient's pain/discomfort is manageable  Description  Patient's pain/discomfort is manageable  Outcome: Ongoing  Note:   Pt report pain at 0 on scale. Pt states oral medication helping to achieve pain goal of a 0 on scale. Problem: Discharge Planning:  Goal: Patients continuum of care needs are met  Description  Patients continuum of care needs are met  Outcome: Ongoing  Note:   Pt plans [facility] at discharge. Care manager and social working helping with discharge needs.        Problem: Nutrition  Goal: Optimal nutrition therapy  Outcome: Ongoing  Note:

## 2019-04-07 NOTE — FLOWSHEET NOTE
The patient was very nauseated and her spouse had called for the attending nurse. A prayer was offered for the patient asking God for His healing touch in the life and body of the patient. The patient's  expressed his gratitude and stated that the visit was timely.        04/07/19 3839   Encounter Summary   Services provided to: Patient and family together   Referral/Consult From: [facility]   Support System Spouse   Continue Visiting Yes  (4/7)   Complexity of Encounter Moderate   Length of Encounter 15 minutes   Routine   Type Initial   Assessment Anxious   Intervention Prayer   Outcome Expressed gratitude

## 2019-04-07 NOTE — PLAN OF CARE
Govind Burnette RN  Outcome: Ongoing  Note:   Pt report pain at 0 on scale. Pt states oral medication helping to achieve pain goal of a 0 on scale. Problem: Discharge Planning:  Goal: Patients continuum of care needs are met  Description  Patients continuum of care needs are met  4/7/2019 1708 by Kendal Espinoza RN  Outcome: Ongoing  4/7/2019 0356 by Govind Burnette RN  Outcome: Ongoing  Note:   Pt plans [facility] at discharge. Care manager and social working helping with discharge needs. Problem: Nutrition  Goal: Optimal nutrition therapy  4/7/2019 1708 by Kendal Espinoza RN  Outcome: Ongoing  4/7/2019 0356 by Govind Burnette RN  Outcome: Ongoing  Note:   Patient tolerating diet fairly well. Problem: DISCHARGE BARRIERS  Goal: Patient's continuum of care needs are met  4/7/2019 1708 by Kendal Espinoza RN  Outcome: Ongoing  4/7/2019 0356 by Govind Burnette RN  Outcome: Ongoing  Note:   Pt plans [facility] at discharge. Care manager and social working helping with discharge needs. Problem: GI  Goal: No bowel complications  8/4/6799 0024 by Kendal Espinoza RN  Outcome: Ongoing  4/7/2019 0356 by Govind Burnette RN  Outcome: Ongoing  Note:   Pt with active bowel sounds, passing flatus, and without nausea. No bm. Problem:   Goal: Adequate urinary output, incontinent of urine and bm  4/7/2019 1708 by Kendal Espinoza RN  Outcome: Ongoing  4/7/2019 0356 by Govind Burnette RN  Outcome: Ongoing  Note:   Pt voiding adequate amounts without difficulty,   Care plan reviewed with patient and  both,  verbalize understanding of the plan of care and contribute to goal setting.

## 2019-04-07 NOTE — PROCEDURES
A Bladder scan was performed at 1645 . The patient is incontinent . The residual amount was measured to be 326 ML. Report of results was given to [employer].

## 2019-04-07 NOTE — PROGRESS NOTES
Hospitalist Progress Note    Patient:  Booker Garrison  YOB: 1945  MRN: [id number]   PCP: No primary care provider on file. Acct: [de-identified]  Unit/Bed: [facility]20/020-A    Date of Admission: 4/4/2019      ASSESSMENT     1. Discitis/osteomyelitis of the lower thoracic spine   1. Afebrile w/o leukocytosis on presentation  2. MRI at [facility] showed discitis at T8-T9. Recent hx of Proteus infection  3. Dr. Aaron Parker, ID on board, appreciate recommendations, recommending biopsy  to be performed on 4/8  4. Patient started on Zosyn and vancomcyin empirically, however discontinued per Dr. Aaron Parker, now on Ceftriaxone only as of 4/5  5. Enoxaparin discontinued on 4/5 as patient may be going for biopsy on Monday 4/8  6. Blood cx negative to date  2. Oliguria, urinary retention  1. Had >600 mL out last night and only 150 mL so far today  2. Continue IV fluids at 40 mL/hr. 3. Bladder scan showed 364 mL of fluid with signs of urinary retention  4. Repeat bladder scan on 4/7 again shows urinary retention  5. Has recurrent UTIs, UA @ [facility] for nitrites, UA here unremarkable for signs of infection  6. External catheter in place  7. Will plan to place ayers catheter today on 4/7  3. Loose stools  1. Likely as a result of suppository administered 2 days ago  2. Hold docusate  3. Continue to monitor  4. C diff PCR if it persists  4. Chronic co-morbidities  1. DM Type 2, relatively controlled, HgbA1C 6.6.  2. Essential hypertension  3. Morbid obesity  4. History of CVA  5. Hypoglycemia  1. Place on D5/ 1/2 NS and continue to monitor  2. Hold Lantus for now. Check early morning cortisol  3. Check prealbumin to determine level of nutrition  6. Hyponatremia  1. Fluids changed to D5NS  2. On a number of medications that could lead to hypoglycemia, but will not stop for now  3. Continue to monitor  7. Hypokalemia  1. Replace prn  8. Severe protein calorie malnutrition  1. Prealbumin 5.7  9.  Nausea, hydrochlorothiazide  12.5 mg Oral Daily    lisinopril  20 mg Oral BID    Magnesium Oxide  250 mg Oral Daily    metoprolol tartrate  50 mg Oral BID    insulin lispro  0-6 Units Subcutaneous TID WC    insulin lispro  0-3 Units Subcutaneous Nightly     PRN Meds: docusate sodium, ondansetron, promethazine, acetaminophen, glucose, dextrose, glucagon (rDNA), dextrose, ondansetron    Ins and outs:      Intake/Output Summary (Last 24 hours) at 4/7/2019 1531  Last data filed at 4/7/2019 1420  Gross per 24 hour   Intake 1873.17 ml   Output --   Net 1873.17 ml       Physical Examination     BP (!) 146/58   Pulse 65   Temp 98 °F (36.7 °C) (Oral)   Resp 16   Ht 5' (1.524 m)   Wt 213 lb 14.4 oz (97 kg) Comment: pts  states he thinks she weighs less, will zero bed next time pt is out of it  LMP  (LMP Unknown) Comment: post menopase  SpO2 95%   Breastfeeding? No   BMI 41.77 kg/m²     General appearance: No apparent distress. Obese. Appears ill this am  HEENT: Extraocular motion intact. Oral muscosadry  Neck: Supple  Respiratory:  CTA bilaterally except for decreased breath sounds at the bilateral lung bases   Cardiovascular: RRR with normal S1/S2. 2/6 YENY, no rubs or gallops. Abdomen: Soft, non-tender, non-distended with normal bowel sounds. Musculoskeletal: Patient is moving extremities x 4 spontaneously  Neurologic: Grossly non focal. CN: II-XII intact  Psychiatric: Alert and oriented  Vascular: Dorsalis pedis palpable bilaterally. Radial pulses palpable bilaterally. Mild swelling at the left hand in the area of the IV site  Skin:  No visible rashes or lesions.     Labs     Recent Labs     04/04/19 2127 04/07/19 0615   WBC 9.3 7.8   HGB 10.0* 10.9*   HCT 32.4* 34.8*    278     Recent Labs     04/04/19 2127 04/07/19 0615    131*   K 4.4 3.3*    98   CO2 23 22*   BUN 3* <2*   CREATININE 0.4 0.3*   CALCIUM 8.7 8.0*     Recent Labs     04/04/19 2127   AST 6   ALT <5*   BILIDIR <0.2 BILITOT 0.5   ALKPHOS 94     No results for input(s): INR in the last 72 hours. No results for input(s): Charles Cathy in the last 72 hours. Urinalysis:      Lab Results   Component Value Date    NITRU NEGATIVE 04/05/2019    BLOODU NEGATIVE 04/05/2019    GLUCOSEU NEGATIVE 04/05/2019       Diagnostic imaging/procedures     Mri Comparison Of Outside Films    Result Date: 4/5/2019  Radiology exam is complete. No Radiologist dictation. Please follow up with ordering provider. Ct Comparison Of Outside Films    Result Date: 4/5/2019  Radiology exam is complete. No Radiologist dictation. Please follow up with ordering provider. DVT prophylaxis: ? Lovenox                                 ? SCDs                                 ? SQ Heparin                                 ? Encourage ambulation           ? Already on Anticoagulation     Disposition:    ? Home       ? TCU       ? Rehab       ? Psych       ? SNF       ? [facility]       ?  Other-

## 2019-04-07 NOTE — PROCEDURES
A Bladder scan was performed at 1350 . The patient's last void was at ? Dolly Marc The residual amount was measured to be 334 ML. Report of results was given to [employer].

## 2019-04-07 NOTE — PROGRESS NOTES
Progress note: Infectious diseases    Patient - aNya Coughlin,  Age - 72 y.o.    - 1945      Room Number - 7K-20/020-A   MRN -  [id number]   Acct # - [de-identified]  Date of Admission -  2019  5:20 PM    SUBJECTIVE:   She has no new complaints. OBJECTIVE   VITALS    height is 5' (1.524 m) and weight is 213 lb 14.4 oz (97 kg). Her oral temperature is 98 °F (36.7 °C). Her blood pressure is 146/58 (abnormal) and her pulse is 65. Her respiration is 16 and oxygen saturation is 95%. Wt Readings from Last 3 Encounters:   19 213 lb 14.4 oz (97 kg)       I/O (24 Hours)    Intake/Output Summary (Last 24 hours) at 2019 1211  Last data filed at 2019 0510  Gross per 24 hour   Intake 2114.17 ml   Output --   Net 2114.17 ml       General Appearance  Awake, alert, oriented,  not  In acute distress  HEENT - normocephalic, atraumatic, pink conjunctiva,  anicteric sclera  Neck - Supple, no mass  Lungs -  Bilateral  air entry, diminished breath sounds.   Cardiovascular - Heart sounds are normal.    Abdomen - soft, not distended, nontender,   Neurologic - right-sided weakness  Skin - No bruising or bleeding  Extremities - + edema, no cyanosis, clubbing     MEDICATIONS:      pantoprazole  40 mg Oral QAM AC    promethazine  12.5 mg Intramuscular Once    miconazole   Topical BID    cefTRIAXone (ROCEPHIN) IV  2 g Intravenous Q24H    polyethylene glycol  17 g Oral Daily    lactobacillus  1 capsule Oral Daily with breakfast    amLODIPine  5 mg Oral Daily    atorvastatin  20 mg Oral Nightly    calcium elemental  500 mg Oral Daily    citalopram  10 mg Oral Daily    docusate sodium  100 mg Oral Daily    hydrochlorothiazide  12.5 mg Oral Daily    lisinopril  20 mg Oral BID    Magnesium Oxide  250 mg Oral Daily    metoprolol tartrate  50 mg Oral BID    insulin glargine  0.25 Units/kg Subcutaneous Nightly    insulin lispro  0-6 Units Subcutaneous TID     insulin lispro  0-3 Units Subcutaneous Nightly      dextrose 5 % and 0.45 % NaCl 40 mL/hr at 04/07/19 0459    dextrose       ondansetron, promethazine, acetaminophen, glucose, dextrose, glucagon (rDNA), dextrose, ondansetron      LABS:     CBC:   Recent Labs     04/04/19 2127 04/07/19 0615   WBC 9.3 7.8   HGB 10.0* 10.9*    278     BMP:    Recent Labs     04/04/19 2127 04/07/19 0615    131*   K 4.4 3.3*    98   CO2 23 22*   BUN 3* <2*   CREATININE 0.4 0.3*   GLUCOSE 105 131*     Calcium:  Recent Labs     04/07/19 0615   CALCIUM 8.0*     Ionized Calcium:No results for input(s): IONCA in the last 72 hours. Magnesium:No results for input(s): MG in the last 72 hours. Phosphorus:No results for input(s): PHOS in the last 72 hours. BNP:No results for input(s): BNP in the last 72 hours. Glucose:  Recent Labs     04/06/19  2054 04/07/19  0627 04/07/19  1056   POCGLU 122* 130* 146*     HgbA1C:   Recent Labs     04/04/19 2127   LABA1C 6.6*     INR: No results for input(s): INR in the last 72 hours. Hepatic:   Recent Labs     04/04/19 2127   ALKPHOS 94   ALT <5*   AST 6   PROT 6.9   BILITOT 0.5   BILIDIR <0.2   LABALBU 2.6*     Amylase and Lipase:  Recent Labs     04/04/19 2127   LACTA 1.1     Lactic Acid:   Recent Labs     04/04/19 2127   LACTA 1.1     Troponin: No results for input(s): CKTOTAL, CKMB, TROPONINI in the last 72 hours. BNP: No results for input(s): BNP in the last 72 hours.     CULTURES:   UA:   Recent Labs     04/05/19  1731   PHUR 6.5   COLORU YELLOW   PROTEINU NEGATIVE   BLOODU NEGATIVE   NITRU NEGATIVE   GLUCOSEU NEGATIVE   BILIRUBINUR NEGATIVE   UROBILINOGEN 1.0   KETUA NEGATIVE     Micro:   Lab Results   Component Value Date    BC No growth-preliminary 04/04/2019         Problem list of patient:     Patient Active Problem List   Diagnosis Code    [facility] M86.9    Acute cystitis without hematuria N30.00    Type 2 diabetes mellitus with other specified complication (HCC) G34.50    Essential hypertension I10    Moderate malnutrition (HCC) E44.0    Examination for normal comparison for clinical research Z00.6    Discitis M46.40    Oliguria R34    Morbid obesity (HCC) E66.01    History of CVA (cerebrovascular accident) Z86.73         ASSESSMENT/PLAN   · Discitis/osteomyelitis of the thoracic spine: Awaiting biopsy tomorrow. · History of recurrent urinary tract infection with recent history of Bacteremia due to Proteus. · History of CVA with right-sided weakness  · Limited mobility with deconditioning. · Continue current treatment.     Felix Bowers MD, [facility] 4/7/2019 12:11 PM

## 2019-04-07 NOTE — PROGRESS NOTES
Discussed the order for ayers catheter due bladder scan first of 334 and second bladder scan being a post void residual scan of 326. Explained reason, and discussed with patient and . Patient does not want a ayers catheter at this time. Verbalizes \" they are uncomfortable, and painful\". Patient will \"think about this and let us know\" she stated. Informed Dr Emily Barger and Giana Brown CNP, regarding patients wishes, no new orders received.

## 2019-04-08 ENCOUNTER — APPOINTMENT (OUTPATIENT)
Dept: INTERVENTIONAL RADIOLOGY/VASCULAR | Age: 74
DRG: 477 | End: 2019-04-08
Attending: INTERNAL MEDICINE
Payer: MEDICARE

## 2019-04-08 ENCOUNTER — APPOINTMENT (OUTPATIENT)
Dept: GENERAL RADIOLOGY | Age: 74
DRG: 477 | End: 2019-04-08
Attending: INTERNAL MEDICINE
Payer: MEDICARE

## 2019-04-08 PROBLEM — E44.0 MODERATE MALNUTRITION (HCC): Status: ACTIVE | Noted: 2019-04-08

## 2019-04-08 LAB
ANION GAP SERPL CALCULATED.3IONS-SCNC: 11 MEQ/L (ref 8–16)
BASOPHILS # BLD: 0.4 %
BASOPHILS ABSOLUTE: 0 THOU/MM3 (ref 0–0.1)
BUN BLDV-MCNC: < 2 MG/DL (ref 7–22)
CALCIUM SERPL-MCNC: 8 MG/DL (ref 8.5–10.5)
CHLORIDE BLD-SCNC: 99 MEQ/L (ref 98–111)
CO2: 22 MEQ/L (ref 23–33)
CORTISOL COLLECTION INFO: NORMAL
CORTISOL: 12.36 UG/DL
CREAT SERPL-MCNC: 0.2 MG/DL (ref 0.4–1.2)
EOSINOPHIL # BLD: 2.6 %
EOSINOPHILS ABSOLUTE: 0.2 THOU/MM3 (ref 0–0.4)
ERYTHROCYTE [DISTWIDTH] IN BLOOD BY AUTOMATED COUNT: 15.7 % (ref 11.5–14.5)
ERYTHROCYTE [DISTWIDTH] IN BLOOD BY AUTOMATED COUNT: 45.5 FL (ref 35–45)
GFR SERPL CREATININE-BSD FRML MDRD: > 90 ML/MIN/1.73M2
GLUCOSE BLD-MCNC: 123 MG/DL (ref 70–108)
GLUCOSE BLD-MCNC: 128 MG/DL (ref 70–108)
GLUCOSE BLD-MCNC: 137 MG/DL (ref 70–108)
GLUCOSE BLD-MCNC: 138 MG/DL (ref 70–108)
HCT VFR BLD CALC: 34.4 % (ref 37–47)
HEMOGLOBIN: 11 GM/DL (ref 12–16)
IMMATURE GRANS (ABS): 0.04 THOU/MM3 (ref 0–0.07)
IMMATURE GRANULOCYTES: 0.5 %
INR BLD: 1.49 (ref 0.85–1.13)
LYMPHOCYTES # BLD: 22.9 %
LYMPHOCYTES ABSOLUTE: 1.7 THOU/MM3 (ref 1–4.8)
MAGNESIUM: 1.5 MG/DL (ref 1.6–2.4)
MCH RBC QN AUTO: 26.1 PG (ref 26–33)
MCHC RBC AUTO-ENTMCNC: 32 GM/DL (ref 32.2–35.5)
MCV RBC AUTO: 81.7 FL (ref 81–99)
MONOCYTES # BLD: 11.8 %
MONOCYTES ABSOLUTE: 0.9 THOU/MM3 (ref 0.4–1.3)
NUCLEATED RED BLOOD CELLS: 0 /100 WBC
PLATELET # BLD: 261 THOU/MM3 (ref 130–400)
PMV BLD AUTO: 9.4 FL (ref 9.4–12.4)
POTASSIUM SERPL-SCNC: 3.4 MEQ/L (ref 3.5–5.2)
RBC # BLD: 4.21 MILL/MM3 (ref 4.2–5.4)
SEG NEUTROPHILS: 61.8 %
SEGMENTED NEUTROPHILS ABSOLUTE COUNT: 4.6 THOU/MM3 (ref 1.8–7.7)
SODIUM BLD-SCNC: 132 MEQ/L (ref 135–145)
WBC # BLD: 7.4 THOU/MM3 (ref 4.8–10.8)

## 2019-04-08 PROCEDURE — 99221 1ST HOSP IP/OBS SF/LOW 40: CPT | Performed by: NURSE PRACTITIONER

## 2019-04-08 PROCEDURE — 83735 ASSAY OF MAGNESIUM: CPT

## 2019-04-08 PROCEDURE — 87077 CULTURE AEROBIC IDENTIFY: CPT

## 2019-04-08 PROCEDURE — 99232 SBSQ HOSP IP/OBS MODERATE 35: CPT | Performed by: INTERNAL MEDICINE

## 2019-04-08 PROCEDURE — 87075 CULTR BACTERIA EXCEPT BLOOD: CPT

## 2019-04-08 PROCEDURE — 87184 SC STD DISK METHOD PER PLATE: CPT

## 2019-04-08 PROCEDURE — 6360000002 HC RX W HCPCS

## 2019-04-08 PROCEDURE — 82948 REAGENT STRIP/BLOOD GLUCOSE: CPT

## 2019-04-08 PROCEDURE — 85025 COMPLETE CBC W/AUTO DIFF WBC: CPT

## 2019-04-08 PROCEDURE — 6360000002 HC RX W HCPCS: Performed by: RADIOLOGY

## 2019-04-08 PROCEDURE — 6360000002 HC RX W HCPCS: Performed by: INTERNAL MEDICINE

## 2019-04-08 PROCEDURE — 2709999900 HC NON-CHARGEABLE SUPPLY

## 2019-04-08 PROCEDURE — 36415 COLL VENOUS BLD VENIPUNCTURE: CPT

## 2019-04-08 PROCEDURE — 0P943ZX DRAINAGE OF THORACIC VERTEBRA, PERCUTANEOUS APPROACH, DIAGNOSTIC: ICD-10-PCS | Performed by: RADIOLOGY

## 2019-04-08 PROCEDURE — 85610 PROTHROMBIN TIME: CPT

## 2019-04-08 PROCEDURE — 2580000003 HC RX 258: Performed by: INTERNAL MEDICINE

## 2019-04-08 PROCEDURE — 87186 SC STD MICRODIL/AGAR DIL: CPT

## 2019-04-08 PROCEDURE — 2500000003 HC RX 250 WO HCPCS

## 2019-04-08 PROCEDURE — 80048 BASIC METABOLIC PNL TOTAL CA: CPT

## 2019-04-08 PROCEDURE — 77003 FLUOROGUIDE FOR SPINE INJECT: CPT | Performed by: RADIOLOGY

## 2019-04-08 PROCEDURE — 87205 SMEAR GRAM STAIN: CPT

## 2019-04-08 PROCEDURE — 6370000000 HC RX 637 (ALT 250 FOR IP): Performed by: INTERNAL MEDICINE

## 2019-04-08 PROCEDURE — 6370000000 HC RX 637 (ALT 250 FOR IP): Performed by: PHYSICIAN ASSISTANT

## 2019-04-08 PROCEDURE — 82533 TOTAL CORTISOL: CPT

## 2019-04-08 PROCEDURE — 71045 X-RAY EXAM CHEST 1 VIEW: CPT

## 2019-04-08 PROCEDURE — 6370000000 HC RX 637 (ALT 250 FOR IP)

## 2019-04-08 PROCEDURE — 2580000003 HC RX 258: Performed by: RADIOLOGY

## 2019-04-08 PROCEDURE — 1200000003 HC TELEMETRY R&B

## 2019-04-08 PROCEDURE — 62267 INTERDISCAL PERQ ASPIR DX: CPT | Performed by: RADIOLOGY

## 2019-04-08 PROCEDURE — 87070 CULTURE OTHR SPECIMN AEROBIC: CPT

## 2019-04-08 RX ORDER — MIDAZOLAM HYDROCHLORIDE 1 MG/ML
0.5 INJECTION INTRAMUSCULAR; INTRAVENOUS ONCE
Status: COMPLETED | OUTPATIENT
Start: 2019-04-08 | End: 2019-04-08

## 2019-04-08 RX ORDER — MAGNESIUM SULFATE IN WATER 40 MG/ML
2 INJECTION, SOLUTION INTRAVENOUS ONCE
Status: COMPLETED | OUTPATIENT
Start: 2019-04-08 | End: 2019-04-08

## 2019-04-08 RX ORDER — FENTANYL CITRATE 50 UG/ML
25 INJECTION, SOLUTION INTRAMUSCULAR; INTRAVENOUS ONCE
Status: COMPLETED | OUTPATIENT
Start: 2019-04-08 | End: 2019-04-08

## 2019-04-08 RX ORDER — DOCUSATE SODIUM 100 MG/1
100 CAPSULE, LIQUID FILLED ORAL 2 TIMES DAILY
Status: DISCONTINUED | OUTPATIENT
Start: 2019-04-09 | End: 2019-04-15 | Stop reason: HOSPADM

## 2019-04-08 RX ORDER — MULTIVITAMIN/IRON/FOLIC ACID 18MG-0.4MG
250 TABLET ORAL DAILY
Status: DISCONTINUED | OUTPATIENT
Start: 2019-04-09 | End: 2019-04-09

## 2019-04-08 RX ORDER — HYDRALAZINE HYDROCHLORIDE 20 MG/ML
10 INJECTION INTRAMUSCULAR; INTRAVENOUS ONCE
Status: COMPLETED | OUTPATIENT
Start: 2019-04-08 | End: 2019-04-08

## 2019-04-08 RX ORDER — POTASSIUM CHLORIDE 7.45 MG/ML
10 INJECTION INTRAVENOUS
Status: COMPLETED | OUTPATIENT
Start: 2019-04-08 | End: 2019-04-08

## 2019-04-08 RX ADMIN — MAGNESIUM SULFATE HEPTAHYDRATE 2 G: 40 INJECTION, SOLUTION INTRAVENOUS at 10:27

## 2019-04-08 RX ADMIN — MIDAZOLAM 0.5 MG: 1 INJECTION INTRAMUSCULAR; INTRAVENOUS at 15:17

## 2019-04-08 RX ADMIN — ATORVASTATIN CALCIUM 20 MG: 20 TABLET, FILM COATED ORAL at 20:50

## 2019-04-08 RX ADMIN — METOPROLOL TARTRATE 50 MG: 50 TABLET, FILM COATED ORAL at 20:50

## 2019-04-08 RX ADMIN — POTASSIUM CHLORIDE 10 MEQ: 7.46 INJECTION, SOLUTION INTRAVENOUS at 11:30

## 2019-04-08 RX ADMIN — POTASSIUM CHLORIDE 10 MEQ: 7.46 INJECTION, SOLUTION INTRAVENOUS at 10:16

## 2019-04-08 RX ADMIN — SODIUM CHLORIDE: 4.5 INJECTION, SOLUTION INTRAVENOUS at 10:49

## 2019-04-08 RX ADMIN — LISINOPRIL 20 MG: 20 TABLET ORAL at 20:50

## 2019-04-08 RX ADMIN — FENTANYL CITRATE 25 MCG: 50 INJECTION, SOLUTION INTRAMUSCULAR; INTRAVENOUS at 15:17

## 2019-04-08 RX ADMIN — HYDRALAZINE HYDROCHLORIDE 10 MG: 20 INJECTION INTRAMUSCULAR; INTRAVENOUS at 10:43

## 2019-04-08 RX ADMIN — METOPROLOL TARTRATE 50 MG: 50 TABLET, FILM COATED ORAL at 10:16

## 2019-04-08 RX ADMIN — CEFTRIAXONE SODIUM 2 G: 2 INJECTION, POWDER, FOR SOLUTION INTRAMUSCULAR; INTRAVENOUS at 18:21

## 2019-04-08 RX ADMIN — MICONAZOLE NITRATE: 2 POWDER TOPICAL at 23:53

## 2019-04-08 RX ADMIN — PANTOPRAZOLE SODIUM 40 MG: 40 TABLET, DELAYED RELEASE ORAL at 05:38

## 2019-04-08 RX ADMIN — ACETAMINOPHEN 1000 MG: 500 TABLET, FILM COATED ORAL at 20:50

## 2019-04-08 ASSESSMENT — PAIN SCALES - GENERAL
PAINLEVEL_OUTOF10: 0
PAINLEVEL_OUTOF10: 2
PAINLEVEL_OUTOF10: 2
PAINLEVEL_OUTOF10: 0

## 2019-04-08 ASSESSMENT — PAIN DESCRIPTION - LOCATION: LOCATION: HEAD

## 2019-04-08 ASSESSMENT — PAIN DESCRIPTION - DESCRIPTORS: DESCRIPTORS: HEADACHE

## 2019-04-08 ASSESSMENT — PAIN DESCRIPTION - PAIN TYPE: TYPE: ACUTE PAIN

## 2019-04-08 NOTE — PROGRESS NOTES
· Problem: Moderate malnutrition, In context of acute illness or injury  · Etiology: related to Alteration in GI function     Signs and symptoms:  as evidenced by Weight loss, Intake 0-25%, Mild muscle loss, Mild loss of subcutaneous fat    Objective Information:  · Nutrition-Focused Physical Findings: Pt seen & NPO at this time d/t plan thoracic spine bx at 2:30 this afternoon. Pt reports poor intake yet, was nauseated yesterday, but not as much today. Per  pt had bm yesterday & had been givin med. Pt reports did not like Ensure CL, but OK to try magic cup when diet restarts ( FL or more)  · Wound Type: Stage I(coccyx)  · Current Nutrition Therapies:  · Oral Diet Orders: NPO(prr intak reports ~1 month pta. pt with varied & decreased po since admit )   · Oral Diet intake: NPO  · Oral Nutrition Supplement (ONS) Orders: Frozen Oral Supplement(magic cup tid) NPO at this time. · ONS intake: (pt did not like Ensure Clear started magic cup tid when diet is FL or more)  · Anthropometric Measures:  · Ht: 5' (152.4 cm)   · Current Body Wt: 213 lb 13.5 oz (97 kg)  · Admission Body Wt: 213 lb 13.5 oz (97 kg)((4/4))  · Usual Body Wt: (220# 6 weeks ago per pt)  · % Weight Change:  ,  3.2% wt. loss ( Per pt. report) in 6 weeks  · Ideal Body Wt: 100 lb (45.4 kg),   · BMI Classification: BMI > or equal to 40.0 Obese Class III    Nutrition Interventions:   (Diet restart per Dr as pt able. Plan change ONS .  )  Continued Inpatient Monitoring, Education Initiated, Coordination of Care    Nutrition Evaluation:   · Evaluation: No progress toward goals   · Goals: Pt. will consume 75% at meals during LOS.      · Monitoring: Meal Intake, Supplement Intake, Weight, Pertinent Labs, Skin Integrity, Wound Healing, Nausea or Vomiting, Monitor Bowel Function, Patient/Family Education      Electronically signed by Haresh Pradhan RD, LD on 4/8/19 at 2:22 PM    Contact Number: [phone]

## 2019-04-08 NOTE — PROGRESS NOTES
Department of Radiology  Post Procedure Progress Note      Pre-Procedure Diagnosis:  Suspected T9-10 discitis osteomyelitis    Procedure Performed:  T9-10 Aspiration/biopsy    Anesthesia: local / versed and fentanyl    Findings: successful    Immediate Complications:  None    Estimated Blood Loss: minimal    SEE DICTATED PROCEDURE NOTE FOR COMPLETE DETAILS.     Lisa Stock   4/8/2019 3:55 PM

## 2019-04-08 NOTE — PROGRESS NOTES
1450 Patient received in IR for disc space biopsy. Family taken to main radiology waiting are.   1455 Dr Tia Trujillo in to speak with the pt and assessment obtain. This procedure has been fully reviewed with the patient and written informed consent has been obtained. [facility] Procedure started with Dr. Tia Trujillo. 1545 Procedure completed; patient tolerated well. Band aid to lower back; no bleeding noted. Samples collected, labeled, and tubed to lab. C/ [place] [address] Dr Tia Trujillo out to speak with family. 1550 Patient on bed; comfort ensured. 1555 Report called. Patient taken to [facility] via bed; accompanied by family.

## 2019-04-08 NOTE — H&P
Seth Gonzalez MD    promethazine [facility]) injection 12.5 mg, 12.5 mg, Intramuscular, Q6H PRN, Brendon Su MD    pantoprazole (PROTONIX) tablet 40 mg, 40 mg, Oral, QAM AC, Brendon Su MD, 40 mg at 04/08/19 0538    miconazole (MICOTIN) 2 % powder, , Topical, BID, Brendon Su MD    cefTRIAXone (ROCEPHIN) 2 g IVPB in D5W 50ml minibag, 2 g, Intravenous, Q24H, Kurt Amin MD, Stopped at 04/07/19 1256    lactobacillus (CULTURELLE) capsule 1 capsule, 1 capsule, Oral, Daily with breakfast, Brendon Su MD, 1 capsule at 04/07/19 8425    acetaminophen (TYLENOL) tablet 1,000 mg, 1,000 mg, Oral, Q6H PRN, Chelsy Snell PA-C    amLODIPine (NORVASC) tablet 5 mg, 5 mg, Oral, Daily, Brendon Su MD, 5 mg at 04/07/19 0548    atorvastatin (LIPITOR) tablet 20 mg, 20 mg, Oral, Nightly, Chelsy Snell PA-C, 20 mg at 04/07/19 2228    calcium elemental (OSCAL) tablet 500 mg, 500 mg, Oral, Daily, Chelsy Snell PA-C, 500 mg at 04/07/19 0733    citalopram (CELEXA) tablet 10 mg, 10 mg, Oral, Daily, Sindi Knutson PA-C, 10 mg at 04/07/19 8057    hydrochlorothiazide (MICROZIDE) capsule 12.5 mg, 12.5 mg, Oral, Daily, Chelsy Snell PA-C, 12.5 mg at 04/07/19 0734    lisinopril (PRINIVIL;ZESTRIL) tablet 20 mg, 20 mg, Oral, BID, Brendon Su MD, 20 mg at 04/07/19 2228    metoprolol tartrate (LOPRESSOR) tablet 50 mg, 50 mg, Oral, BID, Brendon Su MD, 50 mg at 04/08/19 1016    glucose (GLUTOSE) 40 % oral gel 15 g, 15 g, Oral, PRN, Sindi Knutson PA-C    dextrose 50 % solution 12.5 g, 12.5 g, Intravenous, PRN, Sunshine Knutson PA-C    glucagon (rDNA) injection 1 mg, 1 mg, Intramuscular, PRN, Sindi Knutson PA-C    dextrose 5 % solution, 100 mL/hr, Intravenous, PRN, Sunshine Knutson PA-C    insulin lispro (HUMALOG) injection vial 0-6 Units, 0-6 Units, Subcutaneous, TID WC, Sindi Knutson PA-C, 1 Units at 04/07/19 1147    insulin lispro (HUMALOG) injection vial 0-3 Units, 0-3 Units,

## 2019-04-08 NOTE — PLAN OF CARE
Problem: Risk for Impaired Skin Integrity  Goal: Tissue integrity - skin and mucous membranes  Description  Structural intactness and normal physiological function of skin and  mucous membranes. 4/8/2019 0212 by Shira Rueda RN  Outcome: Ongoing  Note:   Redness on buttocks, EPC applied; excoriation and redness in groin, abd folds and under breasts, miconazole applied. Problem: Falls - Risk of:  Goal: Will remain free from falls  Description  Will remain free from falls  4/8/2019 0212 by Shira Rueda RN  Outcome: Ongoing  Note:   Pt free from falls this shift, non skid socks on when up, does not ambulate, uses call light for assistance, bed alarm zone 2, A&Ox4. Problem: Daily Care:  Goal: Daily care needs are met  Description  Daily care needs are met  4/8/2019 0212 by Shira Rueda RN  Outcome: Ongoing     Problem: Pain:  Goal: Patient's pain/discomfort is manageable  Description  Patient's pain/discomfort is manageable  4/8/2019 0212 by Shira Rueda RN  Outcome: Ongoing  Note:   Pt denies pain this shift. Problem: Discharge Planning:  Goal: Patients continuum of care needs are met  Description  Patients continuum of care needs are met  4/8/2019 0212 by Shira Rueda RN  Outcome: Ongoing  Note:   Pt planning to return home with  at discharge. Problem: Nutrition  Goal: Optimal nutrition therapy  4/8/2019 0212 by Shira Rueda RN  Outcome: Ongoing  Note:   Denies nausea and vomiting this shift. Problem: DISCHARGE BARRIERS  Goal: Patient's continuum of care needs are met  4/8/2019 0212 by Shira Rueda RN  Outcome: Ongoing  Note:   Pt planning home with  at discharge     Problem: GI  Goal: No bowel complications  1/1/8014 [id number] by Shira Rueda RN  Outcome: Ongoing  Note:   Bowel sounds active x4, soft and rounded upon palpation, nontender passing flatus. No BM this shift.        Problem:   Goal: Adequate urinary output  4/8/2019 0212 by Piper Ring, RN  Outcome: Ongoing  Note:   Pt had 1 large incontinent void this shift, changed, farida care, EPC applied to bottom. Care plan reviewed with patient and spouse. Patient and spouse verbalize understanding of the plan of care and contribute to goal setting.

## 2019-04-08 NOTE — CONSULTS
Urology Consult Note      Reason for Consult:  Urinary retention  Requesting Physician:  Dr. Ledy Dyer    History Obtained From:  patient, spouse, electronic medical record    Chief Complaint: suspected osteomyelitis    HISTORY OF PRESENT ILLNESS:                The patient is a 72 y.o. female with significant past medical history of CVA who was admitted with discitis/osteomyelitis of the lower thoracic spine. During [facility] hospital course, she was found to be in urinary retention. She is incontinent of urine x 10+ years since CVA. Yesterday, she had a bladder scan which revealed a PVR of 326 ml. Lloyd catheter was ordered, however, patient and spouse refused. Anneliese Schulte denies any flank or suprapubic pain, no recent fever or chills, gross hematuria. Says she thought she was emptying her bladder completely. Denies a history of kidney stones. Does report recurrent UTI. Urine on 4/5/19 was negative for signs of infection. Preliminary blood cultures were negative.       Past Medical History:        Diagnosis Date    Cerebral artery occlusion with cerebral infarction ([facility]     Right sided weakness    Chronic kidney disease     Diabetes mellitus ([facility]     Discitis     GERD (gastroesophageal reflux disease)     Dysphagia/GERD    Hypertension      Past Surgical History:        Procedure Laterality Date    HYSTERECTOMY         Social History     Socioeconomic History    Marital status:      Spouse name: Not on file    Number of children: Not on file    Years of education: Not on file    Highest education level: Not on file   Occupational History    Not on file   Social Needs    Financial resource strain: Not on file    Food insecurity:     Worry: Not on file     Inability: Not on file    Transportation needs:     Medical: Not on file     Non-medical: Not on file   Tobacco Use    Smoking status: Never Smoker    Smokeless tobacco: Never Used   Substance and Sexual Activity    Alcohol use: Never Frequency: Never    Drug use: Never    Sexual activity: Not on file   Lifestyle    Physical activity:     Days per week: Not on file     Minutes per session: Not on file    Stress: Not on file   Relationships    Social connections:     Talks on phone: Not on file     Gets together: Not on file     Attends Cheondo[employer] service: Not on file     Active member of club or organization: Not on file     Attends meetings of clubs or organizations: Not on file     Relationship status: Not on file    Intimate partner violence:     Fear of current or ex partner: Not on file     Emotionally abused: Not on file     Physically abused: Not on file     Forced sexual activity: Not on file   Other Topics Concern    Not on file   Social History Narrative    Not on file       ALNo family history on file. Allergies:  No Known Allergies    ROS:  Constitutional: Negative for chills, fatigue, fever, or weight loss. Eyes: Denies reported visual changes. ENT: Denies headache, difficulty swallowing, nose bleeds, ringing in ears, or earaches. Cardiovascular: Negative for chest pain, palpitations, tachycardia or edema. Respiratory: Denies cough or SOB. GI:The patient denies abdominal or flank pain, anorexia, nausea or vomiting. : See HPI  Musculoskeletal: Reports back pain  Neurological: The patient denies any symptoms of neurological impairment   Lymphatic: Denies swollen glands in neck, axillary or inguinal areas. Psychiatric: Denies anxiety or depression. Skin: Denies rash or lesions. The remainder of the complete ROS is negative    PHYSICAL EXAM:  VITALS:  BP (!) 192/88   Pulse 62   Temp 97.6 °F (36.4 °C) (Oral)   Resp 16   Ht 5' (1.524 m)   Wt 213 lb 14.4 oz (97 kg) Comment: pts  states he thinks she weighs less, will zero bed next time pt is out of it  LMP  (LMP Unknown) Comment: post menopase  SpO2 96%   Breastfeeding? No   BMI 41.77 kg/m² . Nursing note and vitals reviewed.   Constitutional:    Alert 04/05/2019    LEUKOCYTESUR NEGATIVE 04/05/2019    UROBILINOGEN 1.0 04/05/2019    BILIRUBINUR NEGATIVE 04/05/2019    BLOODU NEGATIVE 04/05/2019    GLUCOSEU NEGATIVE 04/05/2019         IMPRESSION:   Urinary retention  Recurrent UTI  Hx CVA    Plan:    Patient and spouse refusing ayers catheter at this time. We discussed straight catheterization as well. They would like to think about it. Marina Forget they will let their nurse know if they would like to treat her urinary retention. Gave them information about Cranberry Extract and D-mannose daily for UTI prevention. Please call us with any questions.     Thank you for including us in the care of Abby 99, APRN  04/08/19 2:01 PM  Urology

## 2019-04-08 NOTE — PROGRESS NOTES
Formulation and discussion of sedation / procedure plans, risks, benefits, side effects and alternatives with patient and/or responsible adult completed.     Electronically signed by Anna Marie Ramirez MD on 4/8/2019 at 3:17 PM

## 2019-04-08 NOTE — CARE COORDINATION
4/8/19, 10:19 AM      [facility] day: 4  Location: 720/020-A Reason for admit: Osteomyelitis [facility]) [M86.9]   Procedure: 4/8/19 Disc biopsy planned today  Treatment Plan of Care:   PCP: Judie Marx MD  [place]  Readmission Risk Score: 14%  Discharge Plan: I spoke with Belle Cantrell and her  romeo this morning.  has been caring for her at home. Current with [facility] . Bedbound after stroke 2 years ago. May need ECF. Disc biopsy today. Await treatment plan. SW on case.

## 2019-04-08 NOTE — PROGRESS NOTES
Progress note: Infectious diseases    Patient - Paul Webster,  Age - 72 y.o.    - 1945      Room Number - 7K-20/020-A   MRN -  [id number]   [facility]  Date of Admission -  2019  5:20 PM    SUBJECTIVE:   No new issues. Awaiting biopsy of the thoracic spine. OBJECTIVE   VITALS    height is 5' (1.524 m) and weight is 213 lb 14.4 oz (97 kg). Her oral temperature is 97.6 °F (36.4 °C). Her blood pressure is 192/88 (abnormal) and her pulse is 62. Her respiration is 16 and oxygen saturation is 96%.        Wt Readings from Last 3 Encounters:   19 213 lb 14.4 oz (97 kg)       I/O (24 Hours)    Intake/Output Summary (Last 24 hours) at 2019 1325  Last data filed at 2019 1232  Gross per 24 hour   Intake 2769.67 ml   Output --   Net 2769.67 ml       MEDICATIONS:      [START ON 2019] Magnesium Oxide  250 mg Oral Daily    [START ON 2019] docusate sodium  100 mg Oral BID    ceFAZolin  1 g Intravenous 60 Min Pre-Op    iohexol  20 mL Other Once    pantoprazole  40 mg Oral QAM AC    miconazole   Topical BID    cefTRIAXone (ROCEPHIN) IV  2 g Intravenous Q24H    lactobacillus  1 capsule Oral Daily with breakfast    amLODIPine  5 mg Oral Daily    atorvastatin  20 mg Oral Nightly    calcium elemental  500 mg Oral Daily    citalopram  10 mg Oral Daily    hydrochlorothiazide  12.5 mg Oral Daily    lisinopril  20 mg Oral BID    metoprolol tartrate  50 mg Oral BID    insulin lispro  0-6 Units Subcutaneous TID WC    insulin lispro  0-3 Units Subcutaneous Nightly      dextrose 5 % and 0.9 % NaCl 40 mL/hr at 19 1353    sodium chloride 20 mL/hr at 19 1049    dextrose       docusate sodium, ondansetron, promethazine, acetaminophen, glucose, dextrose, glucagon (rDNA), dextrose      LABS:     CBC:   Recent Labs     19  0615 19  0828   WBC 7.8 7.4   HGB 10.9* 11.0*   PLT 278 261     BMP:    Recent Labs     04/07/19  0615 04/08/19  0642   * 132*   K 3.3* 3.4*   CL 98 99   CO2 22* 22*   BUN <2* <2*   CREATININE 0.3* 0.2*   GLUCOSE 131* 128*     Calcium:  Recent Labs     04/08/19  0642   CALCIUM 8.0*     Ionized Calcium:No results for input(s): IONCA in the last 72 hours. Magnesium:  Recent Labs     04/08/19  0642   MG 1.5*     Phosphorus:No results for input(s): PHOS in the last 72 hours. BNP:No results for input(s): BNP in the last 72 hours. Glucose:  Recent Labs     04/07/19  2118 04/08/19  0639 04/08/19  1038   POCGLU 113* 123* 138*     HgbA1C:   No results for input(s): LABA1C in the last 72 hours. INR:   Recent Labs     04/07/19 2007 04/08/19  0828   INR 1.56* 1.49*     CULTURES:   UA:   Recent Labs     04/05/19  1731   PHUR 6.5   COLORU YELLOW   PROTEINU NEGATIVE   BLOODU NEGATIVE   NITRU NEGATIVE   GLUCOSEU NEGATIVE   BILIRUBINUR NEGATIVE   UROBILINOGEN 1.0   KETUA NEGATIVE     Micro:   Lab Results   Component Value Date    BC No growth-preliminary 04/04/2019         Problem list of patient:     Patient Active Problem List   Diagnosis Code    Osteomyelitis ([facility] M86.9    Acute cystitis without hematuria N30.00    Type 2 diabetes mellitus with other specified complication ([facility] K47.19    Essential hypertension I10    Severe protein-calorie malnutrition ([address] E43    Examination for normal comparison for clinical research Z00.6    Discitis M46.40    Oliguria R34    Morbid obesity ([address] E66.01    History of CVA (cerebrovascular accident) Z86.73    Hypoglycemia E16.2    Loose stools R19.5    Hyponatremia E87.1    Hypokalemia E87.6         ASSESSMENT/PLAN   · Discitis/osteomyelitis of the thoracic spine: Awaiting biopsy later today. · History of recurrent urinary tract infection with recent history of Bacteremia due to Proteus. · History of CVA with right-sided weakness  · Limited mobility with deconditioning. · Continue current treatment.     · Discussed with her  on the treatment plan.   Suman Curry MD, FACP 4/8/2019 1:25 PM

## 2019-04-08 NOTE — PROGRESS NOTES
Hospitalist Progress Note    Patient:  Sonja Phipps  YOB: 1945  MRN: [id number]   PCP: Jennifer Nunes MD      Acct: [de-identified]  Unit/Bed: [address] [place] [address]    Date of Admission: 4/4/2019      ASSESSMENT     1. Discitis/osteomyelitis of the lower thoracic spine   1. Afebrile w/o leukocytosis on presentation  2. MRI at [facility] showed discitis at T8-T9. Recent hx of Proteus infection  3. Dr. Eliana Starks, ID on board, appreciate recommendations, biopsy planned for today on 4/8.   4. Patient started on Zosyn and vancomcyin empirically, however discontinued per Dr. Eliana Starks, now on Ceftriaxone only as of 4/5  5. Enoxaparin discontinued on 4/5 as patient may be going for biopsy on Monday 4/8  6. Blood cx negative to date  2. Oliguria, urinary retention  1. Had >600 mL out last night and only 150 mL so far today  2. Continue IV fluids at 40 mL/hr. 3. Bladder scan showed 364 mL of fluid with signs of urinary retention  4. Repeat bladder scan on 4/7 again shows urinary retention  5. Has recurrent UTIs, UA @ [facility] for nitrites, UA here unremarkable for signs of infection  6. External catheter in place  7. Planned to place catheter on 4/7 however patient and family declined  3. Loose stools  1. Likely as a result of suppository administered 2 days ago  2. Hold docusate  3. Continue to monitor  4. C diff PCR if it persists  4. Chronic co-morbidities  1. DM Type 2, relatively controlled, HgbA1C 6.6.  2. Essential hypertension  3. Morbid obesity  4. History of CVA  5. Hypoglycemia  1. Place on D5/ 1/2 NS and continue to monitor  2. Hold Lantus for now. Check early morning cortisol  3. Check prealbumin to determine level of nutrition  6. Hyponatremia  1. Fluids changed to D5NS  2. On a number of medications that could lead to hypoglycemia, but will not stop for now  3. Continue to monitor  7. Hypokalemia  1. Replace prn  8. Hypomagnesemia  1. Replace prn  9.  Uncontrolled Oral Daily    citalopram  10 mg Oral Daily    hydrochlorothiazide  12.5 mg Oral Daily    lisinopril  20 mg Oral BID    metoprolol tartrate  50 mg Oral BID    insulin lispro  0-6 Units Subcutaneous TID     insulin lispro  0-3 Units Subcutaneous Nightly     PRN Meds: docusate sodium, ondansetron, promethazine, acetaminophen, glucose, dextrose, glucagon (rDNA), dextrose    Ins and outs:      Intake/Output Summary (Last 24 hours) at 4/8/2019 1121  Last data filed at 4/8/2019 0027  Gross per 24 hour   Intake 2082.51 ml   Output --   Net 2082.51 ml       Physical Examination     BP (!) 192/88   Pulse 62   Temp 97.6 °F (36.4 °C) (Oral)   Resp 16   Ht 5' (1.524 m)   Wt 213 lb 14.4 oz (97 kg) Comment: pts  states he thinks she weighs less, will zero bed next time pt is out of it  LMP  (LMP Unknown) Comment: post menopase  SpO2 96%   Breastfeeding? No   BMI 41.77 kg/m²     General appearance: No apparent distress. Obese. Appears ill this am  HEENT: Extraocular motion intact. Oral muscosadry  Neck: Supple  Respiratory:  CTA bilaterally except for decreased breath sounds at the bilateral lung bases   Cardiovascular: RRR with normal S1/S2. 2/6 YENY, no rubs or gallops. Abdomen: Soft, non-tender, non-distended with normal bowel sounds. Musculoskeletal: Patient is moving extremities x 4 spontaneously  Neurologic: Grossly non focal. CN: II-XII intact  Psychiatric: Alert and oriented  Vascular: Dorsalis pedis palpable bilaterally. Radial pulses palpable bilaterally. Swelling at the left hand in the area of IV site improved. Skin:  No visible rashes or lesions.     Labs     Recent Labs     04/07/19  0615 04/08/19  0828   WBC 7.8 7.4   HGB 10.9* 11.0*   HCT 34.8* 34.4*    261     Recent Labs     04/07/19  0615 04/08/19  0642   * 132*   K 3.3* 3.4*   CL 98 99   CO2 22* 22*   BUN <2* <2*   CREATININE 0.3* 0.2*   CALCIUM 8.0* 8.0*     No results for input(s): AST, ALT, BILIDIR, BILITOT, ALKPHOS in

## 2019-04-08 NOTE — PLAN OF CARE
Problem: Nutrition  Goal: Optimal nutrition therapy  4/8/2019 1419 by Jose Rivas RD, LD  Outcome: Ongoing  Nutrition Problem: Moderate malnutrition, In context of acute illness or injury  Intervention: Diet restart per Dr as pt able. ONS change per pt preference  Nutritional Goals: Pt. will consume 75% at meals during LOS.

## 2019-04-09 LAB
GLUCOSE BLD-MCNC: 103 MG/DL (ref 70–108)
GLUCOSE BLD-MCNC: 121 MG/DL (ref 70–108)
GLUCOSE BLD-MCNC: 132 MG/DL (ref 70–108)
GLUCOSE BLD-MCNC: 157 MG/DL (ref 70–108)
MAGNESIUM: 1.5 MG/DL (ref 1.6–2.4)

## 2019-04-09 PROCEDURE — 6360000002 HC RX W HCPCS: Performed by: INTERNAL MEDICINE

## 2019-04-09 PROCEDURE — 6370000000 HC RX 637 (ALT 250 FOR IP): Performed by: INTERNAL MEDICINE

## 2019-04-09 PROCEDURE — 83735 ASSAY OF MAGNESIUM: CPT

## 2019-04-09 PROCEDURE — 2580000003 HC RX 258: Performed by: RADIOLOGY

## 2019-04-09 PROCEDURE — 99232 SBSQ HOSP IP/OBS MODERATE 35: CPT | Performed by: INTERNAL MEDICINE

## 2019-04-09 PROCEDURE — 97110 THERAPEUTIC EXERCISES: CPT

## 2019-04-09 PROCEDURE — 1200000003 HC TELEMETRY R&B

## 2019-04-09 PROCEDURE — 36415 COLL VENOUS BLD VENIPUNCTURE: CPT

## 2019-04-09 PROCEDURE — 82948 REAGENT STRIP/BLOOD GLUCOSE: CPT

## 2019-04-09 PROCEDURE — 2580000003 HC RX 258: Performed by: INTERNAL MEDICINE

## 2019-04-09 PROCEDURE — 6370000000 HC RX 637 (ALT 250 FOR IP): Performed by: PHYSICIAN ASSISTANT

## 2019-04-09 RX ORDER — MULTIVITAMIN/IRON/FOLIC ACID 18MG-0.4MG
400 TABLET ORAL 2 TIMES DAILY
Status: DISCONTINUED | OUTPATIENT
Start: 2019-04-09 | End: 2019-04-15 | Stop reason: HOSPADM

## 2019-04-09 RX ORDER — FUROSEMIDE 10 MG/ML
10 INJECTION INTRAMUSCULAR; INTRAVENOUS ONCE
Status: COMPLETED | OUTPATIENT
Start: 2019-04-09 | End: 2019-04-09

## 2019-04-09 RX ADMIN — ATORVASTATIN CALCIUM 20 MG: 20 TABLET, FILM COATED ORAL at 21:15

## 2019-04-09 RX ADMIN — FUROSEMIDE 10 MG: 10 INJECTION, SOLUTION INTRAMUSCULAR; INTRAVENOUS at 18:18

## 2019-04-09 RX ADMIN — ONDANSETRON 4 MG: 2 INJECTION INTRAMUSCULAR; INTRAVENOUS at 10:01

## 2019-04-09 RX ADMIN — PROMETHAZINE HYDROCHLORIDE 12.5 MG: 25 INJECTION INTRAMUSCULAR; INTRAVENOUS at 16:13

## 2019-04-09 RX ADMIN — Medication 400 MG: at 21:15

## 2019-04-09 RX ADMIN — SODIUM CHLORIDE: 4.5 INJECTION, SOLUTION INTRAVENOUS at 13:35

## 2019-04-09 RX ADMIN — HYDROCHLOROTHIAZIDE 12.5 MG: 12.5 CAPSULE ORAL at 09:50

## 2019-04-09 RX ADMIN — INSULIN LISPRO 1 UNITS: 100 INJECTION, SOLUTION INTRAVENOUS; SUBCUTANEOUS at 13:29

## 2019-04-09 RX ADMIN — ONDANSETRON 4 MG: 2 INJECTION INTRAMUSCULAR; INTRAVENOUS at 14:25

## 2019-04-09 RX ADMIN — DOCUSATE SODIUM 100 MG: 100 CAPSULE, LIQUID FILLED ORAL at 09:50

## 2019-04-09 RX ADMIN — LISINOPRIL 20 MG: 20 TABLET ORAL at 21:15

## 2019-04-09 RX ADMIN — LISINOPRIL 20 MG: 20 TABLET ORAL at 09:50

## 2019-04-09 RX ADMIN — CEFTRIAXONE SODIUM 2 G: 2 INJECTION, POWDER, FOR SOLUTION INTRAMUSCULAR; INTRAVENOUS at 13:33

## 2019-04-09 RX ADMIN — CALCIUM 500 MG: 500 TABLET ORAL at 09:51

## 2019-04-09 RX ADMIN — PANTOPRAZOLE SODIUM 40 MG: 40 TABLET, DELAYED RELEASE ORAL at 09:50

## 2019-04-09 RX ADMIN — Medication 250 MG: at 09:50

## 2019-04-09 RX ADMIN — Medication 1 CAPSULE: at 09:50

## 2019-04-09 RX ADMIN — AMLODIPINE BESYLATE 5 MG: 5 TABLET ORAL at 09:51

## 2019-04-09 RX ADMIN — METOPROLOL TARTRATE 50 MG: 50 TABLET, FILM COATED ORAL at 09:50

## 2019-04-09 RX ADMIN — METOPROLOL TARTRATE 50 MG: 50 TABLET, FILM COATED ORAL at 21:15

## 2019-04-09 RX ADMIN — CITALOPRAM 10 MG: 20 TABLET, FILM COATED ORAL at 09:51

## 2019-04-09 RX ADMIN — MICONAZOLE NITRATE: 2 POWDER TOPICAL at 12:00

## 2019-04-09 ASSESSMENT — PAIN SCALES - GENERAL
PAINLEVEL_OUTOF10: 2
PAINLEVEL_OUTOF10: 0

## 2019-04-09 NOTE — PROGRESS NOTES
[facility] - 7K-20/020-A    Time In: 1050  Time Out: 1108  Timed Code Treatment Minutes: 1 Minutes  Minutes: 18          Date: 2019  Patient Name: Connor Mckinnon,  Gender:  female        MRN: [id number]  : 1945  (77 y.o.)     Referring Practitioner: Siena Canales PA-C  Diagnosis: osteomyelitis  Additional Pertinent Hx: Patient is a 72year old female transferred from [place] on 19 for T8-T9 discitis.  also reported poor appetite, nausea, vomiting and increased weakness over the past month. Patient has a history of a CVA 2 years ago with R sided deficits. Past Medical History:   Diagnosis Date    Cerebral artery occlusion with cerebral infarction ([facility] [address]     Right sided weakness    Chronic kidney disease     Diabetes mellitus ([facility] [address]     Discitis     GERD (gastroesophageal reflux disease)     Dysphagia/GERD    Hypertension      Past Surgical History:   Procedure Laterality Date    HYSTERECTOMY         Restrictions/Precautions:  General Precautions, Fall Risk                    Other position/activity restrictions: hx of CVA with R sided deficits        Prior Level of Function:  ADL Assistance: Needs assistance  Homemaking Assistance: Needs assistance  Ambulation Assistance: (non-ambulatory)  Transfer Assistance: Needs assistance  Additional Comments: After patient's CVA in 2017, she performed therapy at [facility] prior [facility] and [facility]. Each time patient D/C'd therapy was due to near falls with staff, per . Since return home, patient's  has been utiliizing the [employer] lift to get her from bed to recliner or to wheelchair when leaving the home. States she utilizes a bedpan or adult diapers and her  assists with all sponge baths.  or sister in law is there  with patient. Subjective:     Subjective: RN approved session.  Pt resting in bed at arrival, declines session initally. Pts  present voices they have had \"bad\" expericenes with therapy. Pt agreeable to supine therex. Pain:   .  Pain Assessment  Pain Level: 2       Social/Functional:  Lives With: Spouse  Type of Home: House  Home Layout: One level  Home Access: Ramped entrance  Home Equipment: Tomasz Raiza, Lift chair     Objective:          Exercises:  Exercises  Comments: in bed: AAROM to PROM: ankle pumps, glut set, quad set, heel slides, hip abd/add, straight leg raises, SAQ x15, also performed hip flex, hs, and heel cord stretches B [id number] each for improved LE strength and ROM for functional mobility           Activity Tolerance:  Activity Tolerance: Patient limited by endurance; Patient limited by fatigue    Assessment: Body structures, Functions, Activity limitations: Decreased functional mobility , Decreased safe awareness, Decreased endurance, Decreased strength, Decreased balance  Assessment: Pt tolerated session fair at this time. Pt and  both voice min interest in therapy. Pt remained in bed for session. Prognosis: Fair     REQUIRES PT FOLLOW UP: Yes    Discharge Recommendations:  Discharge Recommendations: Continue to assess pending progress, Subacute/Skilled Nursing Facility, ECF with PT    Patient Education:  Patient Education: education for stretches, POC    Equipment Recommendations:  Equipment Needed: No(continue to monitor)    Safety:  Type of devices:  All fall risk precautions in place, Call light within reach, Left in bed  Restraints  Initially in place: No    Plan:  Times per week: 3-5xGM  Times per day: Daily  Specific instructions for Next Treatment: bed mobility, strength, ROM, sitting tolerance   Current Treatment Recommendations: Strengthening, Balance Training, Endurance Training    Goals:  Patient goals : to get better    Short term goals  Time Frame for Short term goals: 2 weeks  Short term goal 1: Patient will be able to roll L and R with MODx1 to decrease risk

## 2019-04-09 NOTE — PROGRESS NOTES
Progress note: Infectious diseases    Patient - Chas Marin,  Age - 72 y.o.    - 1945      Room Number - 7K-20/020-A   MRN -  [id number]   Acct # - [de-identified]  Date of Admission -  2019  5:20 PM    SUBJECTIVE:   No new issues. OBJECTIVE   VITALS    height is 5' (1.524 m) and weight is 213 lb 14.4 oz (97 kg). Her oral temperature is 98.8 °F (37.1 °C). Her blood pressure is 185/83 (abnormal) and her pulse is 65. Her respiration is 16 and oxygen saturation is 94%.        Wt Readings from Last 3 Encounters:   19 213 lb 14.4 oz (97 kg)       I/O (24 Hours)    Intake/Output Summary (Last 24 hours) at 2019 1529  Last data filed at 2019 1438  Gross per 24 hour   Intake 2349.5 ml   Output --   Net 2349.5 ml       General Appearance  Awake, alert, oriented,    HEENT - normocephalic, atraumatic, pink conjunctiva,  anicteric sclera  Neck - Supple, no mass  Lungs -  Bilateral good air entry, no rhonchi, no wheeze  Cardiovascular - Heart sounds are normal.     Abdomen - soft, not distended, nontender,   Neurologic -right  weakness  Skin - No bruising or bleeding  Extremities - No edema, no cyanosis, clubbing     MEDICATIONS:      Magnesium Oxide  400 mg Oral BID    furosemide  10 mg Intravenous Once    docusate sodium  100 mg Oral BID    ceFAZolin  1 g Intravenous 60 Min Pre-Op    iohexol  20 mL Other Once    pantoprazole  40 mg Oral QAM AC    miconazole   Topical BID    cefTRIAXone (ROCEPHIN) IV  2 g Intravenous Q24H    lactobacillus  1 capsule Oral Daily with breakfast    amLODIPine  5 mg Oral Daily    atorvastatin  20 mg Oral Nightly    calcium elemental  500 mg Oral Daily    citalopram  10 mg Oral Daily    hydrochlorothiazide  12.5 mg Oral Daily    lisinopril  20 mg Oral BID    metoprolol tartrate  50 mg Oral BID    insulin lispro  0-6 Units Subcutaneous TID WC    insulin lispro  0-3 with right-sided weakness  · Limited mobility with deconditioning. · Continue current treatment.  arrange for picc line for long term iv antibiotic            Saritha Duval MD, Jelani Tovar 4/9/2019 3:29 PM

## 2019-04-09 NOTE — PROGRESS NOTES
Hospitalist Progress Note    Patient:  Valerie Bhatia  YOB: 1945  MRN: [id number]   PCP: Judie Marx MD      Acct: [de-identified]  Unit/Bed: [address] [place] [address]-    Date of Admission: 4/4/2019      ASSESSMENT     1. Discitis/osteomyelitis of the lower thoracic spine s/p biopsy on 4/8/2019  1. Afebrile w/o leukocytosis on presentation  2. MRI at [facility] showed discitis at T8-T9. Recent hx of Proteus infection, current wound cultures from biopsy on 4/9 growing Gram negative bacilli- Likely Proteus  3.  [facility], ID on board, appreciate recommendations,  4. Patient started on Zosyn and vancomcyin empirically, however discontinued per  [facility], now on Ceftriaxone only as of 4/5  5. Restart enoxaparin today  6. Blood cx negative to date  2. Oliguria, urinary retention  1. Continue IV fluids at 40 mL/hr.   2. Bladder scan showed 364 mL of fluid with signs of urinary retention  3. Repeat bladder scan on 4/7 again shows urinary retention  4. Has recurrent UTIs, UA @ [facility] for nitrites, UA here unremarkable for signs of infection  5. External catheter in place  6. Planned to place catheter on 4/7 however patient and family declined  3. Pulmonary edema  1. Most recent CXR on 4/8 shows evidence for  pulmonary edema and less likely pneumonia  2. Will give Lasix 10 mg IV x 1today  4. Loose stools  1. Likely as a result of suppository resolved  2. Docusate restarted  3. Continue to monitor  4. C diff PCR if it persists  5. Chronic co-morbidities  1. DM Type 2, relatively controlled, HgbA1C 6.6.  2. Essential hypertension  3. Morbid obesity  4. History of CVA  6. Hypoglycemia  1. Place on D5/ 1/2 NS and continue to monitor  2. Holding Lantus for now  7. Severe protein calorie malnutrition  1. Prealbumin 5.7  8. Hyponatremia  1. On D5NS  2. Continue to monitor  9. Hypokalemia  1. Replace prn  10. Hypomagnesemia  1. Replace prn  11. Uncontrolled hypertension  1. Hydralazine prn  12.  Nausea, constipation  1. Thought to be related to urinary retention, however patient does not want to have Lloyd catheter placed, intermittent. Possible psych component? 2. Will schedule Zofran  3. Constipation relieved with suppository, placed on Miralax and docusate daily, started to have loose stools, therefore currently held  4. Promethazine prn    PLAN     1. Lasix 10 mg  IV x1 for pulmonary edema  2. Agree with Palliative Care consult  3. Patient to be discharged on IV antibiotics for at least 6 weeks. Will need PICC line prior to discahrge  4. Orthopedic Surgery consult    Anticipated Discharge in :  2 to 6 days    Code Status: Full Code    Electronically signed by Andrew Cormier MD on 4/9/2019 at 3:18 PM      Chief Complaint     T8-T9 discitis    SUBJECTIVE     The patient is a 72 y.o. Suezanne Cata yo female who was admitted on 4/4/2019 as a transfer from [facility] for T8-T9 discitis.     - nausea later this afternoon  - Patient had CXR overnight that showed mild pulmonary edema    OBJECTIVE     Medications:  Reviewed    Infusion Medications    dextrose 5 % and 0.9 % NaCl 40 mL/hr at 04/07/19 1353    dextrose       Scheduled Medications    Magnesium Oxide  400 mg Oral BID    furosemide  10 mg Intravenous Once    docusate sodium  100 mg Oral BID    ceFAZolin  1 g Intravenous 60 Min Pre-Op    iohexol  20 mL Other Once    pantoprazole  40 mg Oral QAM AC    miconazole   Topical BID    cefTRIAXone (ROCEPHIN) IV  2 g Intravenous Q24H    lactobacillus  1 capsule Oral Daily with breakfast    amLODIPine  5 mg Oral Daily    atorvastatin  20 mg Oral Nightly    calcium elemental  500 mg Oral Daily    citalopram  10 mg Oral Daily    hydrochlorothiazide  12.5 mg Oral Daily    lisinopril  20 mg Oral BID    metoprolol tartrate  50 mg Oral BID    insulin lispro  0-6 Units Subcutaneous TID WC    insulin lispro  0-3 Units Subcutaneous Nightly     PRN Meds: docusate sodium, ondansetron, promethazine, acetaminophen, glucose, Comparison Of Outside Films    Result Date: 4/5/2019  Radiology exam is complete. No Radiologist dictation. Please follow up with ordering provider. Ct Comparison Of Outside Films    Result Date: 4/5/2019  Radiology exam is complete. No Radiologist dictation. Please follow up with ordering provider. DVT prophylaxis: ? Lovenox                                 ? SCDs                                 ? SQ Heparin                                 ? Encourage ambulation           ? Already on Anticoagulation     Disposition:    ? Home       ? TCU       ? Rehab       ? Psych       ? SNF       ? [facility]       ?  Other-

## 2019-04-09 NOTE — PLAN OF CARE
Problem: Daily Care:  Goal: Daily care needs are met  Description  Daily care needs are met  Outcome: Met This Shift  Note:   Daily care needs met by significant other and staff     Problem: GI  Goal: No bowel complications  Outcome: Met This Shift  Note:   Abdomen soft with abs x4. Passing some flatus per pt;  had large bm previous shift. Problem: Risk for Impaired Skin Integrity  Goal: Tissue integrity - skin and mucous membranes  Description  Structural intactness and normal physiological function of skin and  mucous membranes. Outcome: Ongoing  Note:   Mucous membranes pink, moist.   Pre-existing skin issues treated with micotin powder and epc cream.  Turned frequently. No new skin issues noted     Problem: Pain:  Goal: Patient's pain/discomfort is manageable  Description  Patient's pain/discomfort is manageable  Outcome: Ongoing     Problem: Nutrition  Goal: Optimal nutrition therapy  Outcome: Ongoing  Note:   Eating 25-50% of meals. States food is terrible. Problem: Falls - Risk of:  Goal: Will remain free from falls  Description  Will remain free from falls  Note:   No falls noted this shift. Patient  non-ambulatory. Family member at bedside, spent the night. Bed kept in low position. Safe environment maintained. Bedside table & call light in reach. Uses call light appropriately when needing assistance. Hourly rounding done     Problem: Pain:  Goal: Pain level will decrease  Description  Pain level will decrease  Note:   Pain goal \"no pain\". Rating pain from 0-5 with oral pain meds available per pt request     Problem: Discharge Planning:  Goal: Patients continuum of care needs are met  Description  Patients continuum of care needs are met  Note:   No definite discharge plans in place.    Previously had decided to go home with significant other;  now after speaking with case management; kali lester,  have decided to look into ecf     Problem:   Goal: Adequate urinary output  Note:   Voiding quantities sufficient per wet depends. Care plan reviewed with patient and spouse who verbalize understanding of the plan of care and contribute to goal setting.

## 2019-04-10 ENCOUNTER — APPOINTMENT (OUTPATIENT)
Dept: GENERAL RADIOLOGY | Age: 74
DRG: 477 | End: 2019-04-10
Attending: INTERNAL MEDICINE
Payer: MEDICARE

## 2019-04-10 LAB
AEROBIC CULTURE: ABNORMAL
AEROBIC CULTURE: ABNORMAL
ANAEROBIC CULTURE: ABNORMAL
ANAEROBIC CULTURE: ABNORMAL
ANION GAP SERPL CALCULATED.3IONS-SCNC: 10 MEQ/L (ref 8–16)
BLOOD CULTURE, ROUTINE: NORMAL
BLOOD CULTURE, ROUTINE: NORMAL
BUN BLDV-MCNC: < 2 MG/DL (ref 7–22)
CALCIUM SERPL-MCNC: 8 MG/DL (ref 8.5–10.5)
CHLORIDE BLD-SCNC: 98 MEQ/L (ref 98–111)
CO2: 26 MEQ/L (ref 23–33)
CREAT SERPL-MCNC: 0.3 MG/DL (ref 0.4–1.2)
ERYTHROCYTE [DISTWIDTH] IN BLOOD BY AUTOMATED COUNT: 16.1 % (ref 11.5–14.5)
ERYTHROCYTE [DISTWIDTH] IN BLOOD BY AUTOMATED COUNT: 47.8 FL (ref 35–45)
GFR SERPL CREATININE-BSD FRML MDRD: > 90 ML/MIN/1.73M2
GLUCOSE BLD-MCNC: 111 MG/DL (ref 70–108)
GLUCOSE BLD-MCNC: 148 MG/DL (ref 70–108)
GLUCOSE BLD-MCNC: 154 MG/DL (ref 70–108)
GLUCOSE BLD-MCNC: 93 MG/DL (ref 70–108)
GLUCOSE BLD-MCNC: 99 MG/DL (ref 70–108)
GRAM STAIN RESULT: ABNORMAL
GRAM STAIN RESULT: ABNORMAL
HCT VFR BLD CALC: 32.2 % (ref 37–47)
HEMOGLOBIN: 10 GM/DL (ref 12–16)
MAGNESIUM: 1.5 MG/DL (ref 1.6–2.4)
MCH RBC QN AUTO: 25.5 PG (ref 26–33)
MCHC RBC AUTO-ENTMCNC: 31.1 GM/DL (ref 32.2–35.5)
MCV RBC AUTO: 82.1 FL (ref 81–99)
ORGANISM: ABNORMAL
ORGANISM: ABNORMAL
PLATELET # BLD: 264 THOU/MM3 (ref 130–400)
PMV BLD AUTO: 9.6 FL (ref 9.4–12.4)
POTASSIUM SERPL-SCNC: 3 MEQ/L (ref 3.5–5.2)
RBC # BLD: 3.92 MILL/MM3 (ref 4.2–5.4)
SODIUM BLD-SCNC: 134 MEQ/L (ref 135–145)
WBC # BLD: 6.9 THOU/MM3 (ref 4.8–10.8)

## 2019-04-10 PROCEDURE — 36415 COLL VENOUS BLD VENIPUNCTURE: CPT

## 2019-04-10 PROCEDURE — 83735 ASSAY OF MAGNESIUM: CPT

## 2019-04-10 PROCEDURE — 6360000002 HC RX W HCPCS: Performed by: FAMILY MEDICINE

## 2019-04-10 PROCEDURE — 82948 REAGENT STRIP/BLOOD GLUCOSE: CPT

## 2019-04-10 PROCEDURE — 71045 X-RAY EXAM CHEST 1 VIEW: CPT

## 2019-04-10 PROCEDURE — 85027 COMPLETE CBC AUTOMATED: CPT

## 2019-04-10 PROCEDURE — 36569 INSJ PICC 5 YR+ W/O IMAGING: CPT

## 2019-04-10 PROCEDURE — 80048 BASIC METABOLIC PNL TOTAL CA: CPT

## 2019-04-10 PROCEDURE — 99232 SBSQ HOSP IP/OBS MODERATE 35: CPT | Performed by: FAMILY MEDICINE

## 2019-04-10 PROCEDURE — C1751 CATH, INF, PER/CENT/MIDLINE: HCPCS

## 2019-04-10 PROCEDURE — 02HV33Z INSERTION OF INFUSION DEVICE INTO SUPERIOR VENA CAVA, PERCUTANEOUS APPROACH: ICD-10-PCS | Performed by: RADIOLOGY

## 2019-04-10 PROCEDURE — 6370000000 HC RX 637 (ALT 250 FOR IP): Performed by: INTERNAL MEDICINE

## 2019-04-10 PROCEDURE — 76937 US GUIDE VASCULAR ACCESS: CPT

## 2019-04-10 PROCEDURE — 6370000000 HC RX 637 (ALT 250 FOR IP): Performed by: PHYSICIAN ASSISTANT

## 2019-04-10 PROCEDURE — 2580000003 HC RX 258: Performed by: INTERNAL MEDICINE

## 2019-04-10 PROCEDURE — 6360000002 HC RX W HCPCS: Performed by: INTERNAL MEDICINE

## 2019-04-10 PROCEDURE — 84132 ASSAY OF SERUM POTASSIUM: CPT

## 2019-04-10 PROCEDURE — 1200000003 HC TELEMETRY R&B

## 2019-04-10 RX ORDER — POTASSIUM CHLORIDE 7.45 MG/ML
10 INJECTION INTRAVENOUS
Status: DISPENSED | OUTPATIENT
Start: 2019-04-10 | End: 2019-04-10

## 2019-04-10 RX ORDER — MAGNESIUM SULFATE IN WATER 40 MG/ML
2 INJECTION, SOLUTION INTRAVENOUS ONCE
Status: COMPLETED | OUTPATIENT
Start: 2019-04-10 | End: 2019-04-10

## 2019-04-10 RX ORDER — FUROSEMIDE 10 MG/ML
20 INJECTION INTRAMUSCULAR; INTRAVENOUS ONCE
Status: COMPLETED | OUTPATIENT
Start: 2019-04-10 | End: 2019-04-10

## 2019-04-10 RX ADMIN — INSULIN LISPRO 1 UNITS: 100 INJECTION, SOLUTION INTRAVENOUS; SUBCUTANEOUS at 16:04

## 2019-04-10 RX ADMIN — DOCUSATE SODIUM 100 MG: 100 CAPSULE, LIQUID FILLED ORAL at 22:39

## 2019-04-10 RX ADMIN — POTASSIUM CHLORIDE 10 MEQ: 7.46 INJECTION, SOLUTION INTRAVENOUS at 16:00

## 2019-04-10 RX ADMIN — POTASSIUM CHLORIDE 10 MEQ: 7.46 INJECTION, SOLUTION INTRAVENOUS at 11:07

## 2019-04-10 RX ADMIN — Medication 1 CAPSULE: at 07:53

## 2019-04-10 RX ADMIN — POTASSIUM CHLORIDE 10 MEQ: 7.46 INJECTION, SOLUTION INTRAVENOUS at 14:30

## 2019-04-10 RX ADMIN — METOPROLOL TARTRATE 50 MG: 50 TABLET, FILM COATED ORAL at 09:02

## 2019-04-10 RX ADMIN — Medication 400 MG: at 09:03

## 2019-04-10 RX ADMIN — MAGNESIUM SULFATE HEPTAHYDRATE 2 G: 40 INJECTION, SOLUTION INTRAVENOUS at 14:43

## 2019-04-10 RX ADMIN — CALCIUM 500 MG: 500 TABLET ORAL at 09:02

## 2019-04-10 RX ADMIN — LISINOPRIL 20 MG: 20 TABLET ORAL at 22:33

## 2019-04-10 RX ADMIN — POTASSIUM CHLORIDE 10 MEQ: 7.46 INJECTION, SOLUTION INTRAVENOUS at 17:30

## 2019-04-10 RX ADMIN — Medication 400 MG: at 22:33

## 2019-04-10 RX ADMIN — MICONAZOLE NITRATE: 2 POWDER TOPICAL at 22:43

## 2019-04-10 RX ADMIN — MICONAZOLE NITRATE: 2 POWDER TOPICAL at 09:05

## 2019-04-10 RX ADMIN — METOPROLOL TARTRATE 50 MG: 50 TABLET, FILM COATED ORAL at 22:37

## 2019-04-10 RX ADMIN — AMLODIPINE BESYLATE 5 MG: 5 TABLET ORAL at 09:02

## 2019-04-10 RX ADMIN — LISINOPRIL 20 MG: 20 TABLET ORAL at 09:02

## 2019-04-10 RX ADMIN — PANTOPRAZOLE SODIUM 40 MG: 40 TABLET, DELAYED RELEASE ORAL at 06:24

## 2019-04-10 RX ADMIN — FUROSEMIDE 20 MG: 10 INJECTION, SOLUTION INTRAMUSCULAR; INTRAVENOUS at 17:14

## 2019-04-10 RX ADMIN — MICONAZOLE NITRATE: 2 POWDER TOPICAL at 00:27

## 2019-04-10 RX ADMIN — INSULIN LISPRO 1 UNITS: 100 INJECTION, SOLUTION INTRAVENOUS; SUBCUTANEOUS at 22:33

## 2019-04-10 RX ADMIN — ATORVASTATIN CALCIUM 20 MG: 20 TABLET, FILM COATED ORAL at 22:33

## 2019-04-10 RX ADMIN — POTASSIUM CHLORIDE 10 MEQ: 7.46 INJECTION, SOLUTION INTRAVENOUS at 19:17

## 2019-04-10 RX ADMIN — CEFTRIAXONE SODIUM 2 G: 2 INJECTION, POWDER, FOR SOLUTION INTRAMUSCULAR; INTRAVENOUS at 14:52

## 2019-04-10 RX ADMIN — CITALOPRAM 10 MG: 20 TABLET, FILM COATED ORAL at 09:03

## 2019-04-10 RX ADMIN — HYDROCHLOROTHIAZIDE 12.5 MG: 12.5 CAPSULE ORAL at 09:15

## 2019-04-10 ASSESSMENT — PAIN SCALES - GENERAL
PAINLEVEL_OUTOF10: 0

## 2019-04-10 NOTE — PROGRESS NOTES
Patient in bed awake. Patient alert and oriented times four. Skin warm and dry, color appropriate for ethnicity. Speech clear and appropriate, but slight delay. Apical heart sounds auscultated, regular rhythm. Lung sounds clear. Redness under right breast. Bowel sounds present all 4 quadrants. Abdomen round, soft and non tender. Redness in perineal and abdominal folds. Pedal and post tibialis pulses present +2. Pedal push and pull weak. Edema +1 pitting in lower extremities. Bed in lowest position, bed alarm on, call light and personal possessions within reach. SN Daniel/RSC.

## 2019-04-10 NOTE — PROGRESS NOTES
[address] [address]  SPEECH THERAPY MISSED TREATMENT NOTE  [facility]      Date: 4/10/2019  Patient Name: Naya Coughlin        MRN: [id number]    : 1945  (72 y.o.)    REASON FOR MISSED TREATMENT:    Pt and family speaking with physician at this time. Will check back as schedule allows.       Audi Sue M.S. [facility]-senior care/[id number]

## 2019-04-10 NOTE — PROGRESS NOTES
effusion. Cont replace lytes and monitor po intake. Stop IVF. Monitor BP and cont current meds - if cont trend up may need to increase norvasc. Cont encourage IS. Monitor lytes, renal fxn, BP, BS, resp status. SS following as requesting SNF for IV atbx and therapies. NEED to resume plavix if no further procedures planned per surgery. Chief Complaint: n/v    Hospital Course: Charles Sigala is a 72 y.o. female with hx of CVA with right hemiparesis, dysphagia and WC bound and mobilizes via criss lift, DM2, CKD, GERD, HTN transferred from [facility] for possible discitis at T8-9 with possible OM. ~6 weeks prior admitted there for Proteus bacteremia. [facility]ed to [facility] for 3 weeks of n/v.  ID consulted on admission - Dr. Frankie vasquez done - s/p IR bx of T9-10 disc space aspiration and bx which is growing proteus mirabilis. On IV rocephin since 4/5. Repeat blood cx on admission (-).  N/v improving. Subjective:   -- 4/10/2019  -->  feeding pt breakfast - eating better per . No further n/v so far today. No abd pain this am.  No back pain. Denies cp, sob. Denies f/c.  Bandar po. On RA. Afebrile.   Last BM 4/9 am.      Medications:  Reviewed    Infusion Medications    dextrose       Scheduled Medications    magnesium replacement protocol   Other RX Placeholder    potassium (CARDIAC) replacement protocol   Other RX Placeholder    Magnesium Oxide  400 mg Oral BID    docusate sodium  100 mg Oral BID    ceFAZolin  1 g Intravenous 60 Min Pre-Op    iohexol  20 mL Other Once    pantoprazole  40 mg Oral QAM AC    miconazole   Topical BID    cefTRIAXone (ROCEPHIN) IV  2 g Intravenous Q24H    lactobacillus  1 capsule Oral Daily with breakfast    amLODIPine  5 mg Oral Daily    atorvastatin  20 mg Oral Nightly    calcium elemental  500 mg Oral Daily    citalopram  10 mg Oral Daily    hydrochlorothiazide  12.5 mg Oral Daily    lisinopril  20 mg Oral BID    metoprolol tartrate  50 mg Oral BID    insulin lispro  0-6 Units Subcutaneous TID WC    insulin lispro  0-3 Units Subcutaneous Nightly     PRN Meds: docusate sodium, ondansetron, promethazine, acetaminophen, glucose, dextrose, glucagon (rDNA), dextrose      Intake/Output Summary (Last 24 hours) at 4/10/2019 0806  Last data filed at 4/9/2019 2117  Gross per 24 hour   Intake 1945.63 ml   Output --   Net 1945.63 ml       Diet:  Dietary Nutrition Supplements: Frozen Oral Supplement  DIET CARB CONTROL; Exam:  BP (!) 142/72   Pulse 64   Temp 98.7 °F (37.1 °C) (Oral)   Resp 16   Ht 5' (1.524 m)   Wt 213 lb 14.4 oz (97 kg) Comment: pts  states he thinks she weighs less, will zero bed next time pt is out of it  LMP  (LMP Unknown) Comment: post menopase  SpO2 94%   Breastfeeding? No   BMI 41.77 kg/m²     General appearance: No apparent distress, appears much older than stated age and cooperative. HEENT: Pupils equal, round, and reactive to light. Conjunctivae/corneas clear. Neck: Supple, with full range of motion. No jugular venous distention. Trachea midline. Respiratory:  Normal respiratory effort. Clear to auscultation, bilaterally without Rales/Wheezes/Rhonchi. No respiratory distress or accessory muscle use. Cardiovascular: Regular rate and rhythm with normal S1/S2, 2/6 YENY, No rubs or gallops. Abdomen: Soft, non-tender, non-distended with normal bowel sounds. No rebound or guarding  Musculoskeletal: No clubbing, cyanosis or edema bilaterally. Full range of motion without deformity. No calf tenderness palpation  Skin: Skin color, texture, turgor normal.  No rashes or lesions.   Neurologic:  Slow speech, right hemiparesis,   Psychiatric: Alert and oriented, thought content appropriate  Capillary Refill: Brisk,< 3 seconds   Peripheral Pulses: +1 palpable, equal bilaterally       Labs:   Recent Labs     04/08/19  0828 04/10/19  0650   WBC 7.4 6.9   HGB 11.0* 10.0*   HCT 34.4* 32.2*    264     Recent [x] SCDs --> added 4/10                                 [] SQ Heparin                                 [] Encourage ambulation           [] Already on Anticoagulation     Disposition:    [] Home       [] TCU       [] Rehab       [] Psych       [x] SNF       [] [facility]       [] Other-    Code Status: Full Code    PT/OT Eval Status: following        Electronically signed by Chuckie Westbrook MD on 4/10/2019 at 8:06 AM

## 2019-04-10 NOTE — PROGRESS NOTES
Progress note: Infectious diseases    Patient - Jorgito Hendrix,  Age - 72 y.o.    - 1945      Room Number - 7K-20/020-A   MRN -  [id number]   Acct # - [de-identified]  Date of Admission -  2019  5:20 PM    SUBJECTIVE:   Wound cx growing proteus ( likely source is the urine)  OBJECTIVE   VITALS    height is 5' (1.524 m) and weight is 213 lb 14.4 oz (97 kg). Her oral temperature is 98.4 °F (36.9 °C). Her blood pressure is 160/78 (abnormal) and her pulse is 60. Her respiration is 16 and oxygen saturation is 95%. Wt Readings from Last 3 Encounters:   19 213 lb 14.4 oz (97 kg)       I/O (24 Hours)    Intake/Output Summary (Last 24 hours) at 4/10/2019 1418  Last data filed at 4/10/2019 0841  Gross per 24 hour   Intake 2065.63 ml   Output --   Net 2065.63 ml       General Appearance  Awake, alert, oriented,    HEENT - normocephalic, atraumatic, pink conjunctiva,  anicteric sclera  Neck - Supple, no mass  Lungs -  Bilateral good air entry, no rhonchi, no wheeze  Cardiovascular - Heart sounds are normal.     Abdomen - soft, not distended, nontender,   Neurologic -right  weakness  Skin - No bruising or bleeding  Extremities - picc line on the left upper arm.     MEDICATIONS:      magnesium replacement protocol   Other RX Placeholder    potassium (CARDIAC) replacement protocol   Other RX Placeholder    potassium chloride  10 mEq Intravenous Q2H    magnesium sulfate  2 g Intravenous Once    Magnesium Oxide  400 mg Oral BID    docusate sodium  100 mg Oral BID    ceFAZolin  1 g Intravenous 60 Min Pre-Op    iohexol  20 mL Other Once    pantoprazole  40 mg Oral QAM AC    miconazole   Topical BID    cefTRIAXone (ROCEPHIN) IV  2 g Intravenous Q24H    lactobacillus  1 capsule Oral Daily with breakfast    amLODIPine  5 mg Oral Daily    atorvastatin  20 mg Oral Nightly    calcium elemental  500 mg Oral Daily    citalopram  10 mg Oral Daily    hydrochlorothiazide  12.5 mg Oral Daily    lisinopril  20 mg Oral BID    metoprolol tartrate  50 mg Oral BID    insulin lispro  0-6 Units Subcutaneous TID WC    insulin lispro  0-3 Units Subcutaneous Nightly      dextrose       docusate sodium, ondansetron, promethazine, acetaminophen, glucose, dextrose, glucagon (rDNA), dextrose      LABS:     CBC:   Recent Labs     04/08/19  0828 04/10/19  0650   WBC 7.4 6.9   HGB 11.0* 10.0*    264     BMP:    Recent Labs     04/08/19  0642 04/10/19  0650   * 134*   K 3.4* 3.0*   CL 99 98   CO2 22* 26   BUN <2* <2*   CREATININE 0.2* 0.3*   GLUCOSE 128* 99     Calcium:  Recent Labs     04/10/19  0650   CALCIUM 8.0*     Ionized Calcium:No results for input(s): IONCA in the last 72 hours. Magnesium:  Recent Labs     04/10/19  0650   MG 1.5*     Phosphorus:No results for input(s): PHOS in the last 72 hours. BNP:No results for input(s): BNP in the last 72 hours. Glucose:  Recent Labs     04/09/19  2025 04/10/19  0621 04/10/19  1347   POCGLU 132* 93 111*     HgbA1C: No results for input(s): LABA1C in the last 72 hours.   INR:   Recent Labs     04/07/19 2007 04/08/19  0828   INR 1.56* 1.49*           Problem list of patient:     Patient Active Problem List   Diagnosis Code    Osteomyelitis ([facility] [address] M86.9    Acute cystitis without hematuria N30.00    Type 2 diabetes mellitus with other specified complication ([facility] [address] T49.45    Essential hypertension I10    Severe protein-calorie malnutrition ([facility] [address] E43    Examination for normal comparison for clinical research Z00.6    Discitis M46.40    Oliguria R34    Morbid obesity ([facility] [address] E66.01    History of CVA (cerebrovascular accident) Z86.73    Hypoglycemia E16.2    Loose stools R19.5    Hyponatremia E87.1    Hypokalemia E87.6    Moderate malnutrition (HCC) E44.0    Urinary retention R33.9    Recurrent UTI N39.0         ASSESSMENT/PLAN   · Discitis/osteomyelitis of the thoracic spine: biopsy growing proteus  · History of recurrent urinary tract infection with recent history of Bacteremia due to Proteus. likely source for the discites  · History of CVA with right-sided weakness  · Limited mobility with deconditioning.              Maryann Coyle MD, FACP 4/10/2019 2:18 PM

## 2019-04-10 NOTE — PROGRESS NOTES
Patient in bed awake. Informed patient of potassium replacement. Bed in lowest position, Bed alarm on. Call light and possessions are within reach. N.SN Shivam/RSC.

## 2019-04-10 NOTE — PROGRESS NOTES
Patient in bed awake. Patient alert and oriented times four. Pupils equal, round, and reactive to light. Skin warm and dry, color appropriate for ethnicity. Speech clear and appropriate, but slight delay. Apical heart sounds auscultated, regular rhythm. Lung sounds clear. Bowel sounds present all 4 quadrants. Abdomen round, soft and non tender. Hand grasp moderate left side, weak on right side. Redness in perineal and abdominal folds. Pedal and post tibialis pulses present +2. Pedal push and pull weak. Edema +1 pitting in lower extremities. Bed in lowest position, bed alarm on, call light and personal possessions within reach. Modi Chamber, SN/RS.

## 2019-04-10 NOTE — CARE COORDINATION
4/10/19, 1:55 PM    DISCHARGE BARRIERS      Spoke with Soren Valdes and her . They are requesting [facility]. Referral made to [facility], and bed was not available. Second choice is [address] [place] [address]. Referral made, but no bed available. Third choice is [facility]. Facility has open bed and accepts [facility]. Update at 2027:  [facility] now has a bed available. Referral completed, and facility will accept once precert is complete. [facility] will start precert today.

## 2019-04-10 NOTE — PROGRESS NOTES
Patient in bed awake.  in room. Dr. Tate Carlson in room. Patient denied any further needs at this time. Bed in lowest position, bed alarm on, call light and personal possessions within reach. SN Delfino/C.

## 2019-04-11 LAB
ANION GAP SERPL CALCULATED.3IONS-SCNC: 10 MEQ/L (ref 8–16)
BUN BLDV-MCNC: < 2 MG/DL (ref 7–22)
CALCIUM SERPL-MCNC: 8.1 MG/DL (ref 8.5–10.5)
CHLORIDE BLD-SCNC: 97 MEQ/L (ref 98–111)
CO2: 28 MEQ/L (ref 23–33)
CREAT SERPL-MCNC: 0.3 MG/DL (ref 0.4–1.2)
GFR SERPL CREATININE-BSD FRML MDRD: > 90 ML/MIN/1.73M2
GLUCOSE BLD-MCNC: 116 MG/DL (ref 70–108)
GLUCOSE BLD-MCNC: 117 MG/DL (ref 70–108)
GLUCOSE BLD-MCNC: 155 MG/DL (ref 70–108)
GLUCOSE BLD-MCNC: 159 MG/DL (ref 70–108)
GLUCOSE BLD-MCNC: 166 MG/DL (ref 70–108)
HCT VFR BLD CALC: 30.5 % (ref 37–47)
HEMOGLOBIN: 9.6 GM/DL (ref 12–16)
MAGNESIUM: 1.7 MG/DL (ref 1.6–2.4)
POTASSIUM SERPL-SCNC: 3.5 MEQ/L (ref 3.5–5.2)
POTASSIUM SERPL-SCNC: 3.5 MEQ/L (ref 3.5–5.2)
SODIUM BLD-SCNC: 135 MEQ/L (ref 135–145)
TSH SERPL DL<=0.05 MIU/L-ACNC: 3.44 UIU/ML (ref 0.4–4.2)

## 2019-04-11 PROCEDURE — 2580000003 HC RX 258: Performed by: INTERNAL MEDICINE

## 2019-04-11 PROCEDURE — 6370000000 HC RX 637 (ALT 250 FOR IP): Performed by: INTERNAL MEDICINE

## 2019-04-11 PROCEDURE — 80048 BASIC METABOLIC PNL TOTAL CA: CPT

## 2019-04-11 PROCEDURE — 36415 COLL VENOUS BLD VENIPUNCTURE: CPT

## 2019-04-11 PROCEDURE — 1200000003 HC TELEMETRY R&B

## 2019-04-11 PROCEDURE — 85018 HEMOGLOBIN: CPT

## 2019-04-11 PROCEDURE — 82948 REAGENT STRIP/BLOOD GLUCOSE: CPT

## 2019-04-11 PROCEDURE — 84443 ASSAY THYROID STIM HORMONE: CPT

## 2019-04-11 PROCEDURE — 83735 ASSAY OF MAGNESIUM: CPT

## 2019-04-11 PROCEDURE — 6360000002 HC RX W HCPCS: Performed by: INTERNAL MEDICINE

## 2019-04-11 PROCEDURE — 92526 ORAL FUNCTION THERAPY: CPT

## 2019-04-11 PROCEDURE — 99232 SBSQ HOSP IP/OBS MODERATE 35: CPT | Performed by: FAMILY MEDICINE

## 2019-04-11 PROCEDURE — 6370000000 HC RX 637 (ALT 250 FOR IP): Performed by: PHYSICIAN ASSISTANT

## 2019-04-11 PROCEDURE — 85014 HEMATOCRIT: CPT

## 2019-04-11 RX ORDER — CLOPIDOGREL BISULFATE 75 MG/1
75 TABLET ORAL DAILY
Status: DISCONTINUED | OUTPATIENT
Start: 2019-04-12 | End: 2019-04-15 | Stop reason: HOSPADM

## 2019-04-11 RX ADMIN — CITALOPRAM 10 MG: 20 TABLET, FILM COATED ORAL at 11:00

## 2019-04-11 RX ADMIN — CALCIUM 500 MG: 500 TABLET ORAL at 11:02

## 2019-04-11 RX ADMIN — PANTOPRAZOLE SODIUM 40 MG: 40 TABLET, DELAYED RELEASE ORAL at 06:31

## 2019-04-11 RX ADMIN — HYDROCHLOROTHIAZIDE 12.5 MG: 12.5 CAPSULE ORAL at 11:02

## 2019-04-11 RX ADMIN — ATORVASTATIN CALCIUM 20 MG: 20 TABLET, FILM COATED ORAL at 22:12

## 2019-04-11 RX ADMIN — AMLODIPINE BESYLATE 5 MG: 5 TABLET ORAL at 11:02

## 2019-04-11 RX ADMIN — Medication 1 CAPSULE: at 11:02

## 2019-04-11 RX ADMIN — METOPROLOL TARTRATE 50 MG: 50 TABLET, FILM COATED ORAL at 22:11

## 2019-04-11 RX ADMIN — LISINOPRIL 20 MG: 20 TABLET ORAL at 22:12

## 2019-04-11 RX ADMIN — MICONAZOLE NITRATE: 2 POWDER TOPICAL at 11:03

## 2019-04-11 RX ADMIN — CEFTRIAXONE SODIUM 2 G: 2 INJECTION, POWDER, FOR SOLUTION INTRAMUSCULAR; INTRAVENOUS at 12:53

## 2019-04-11 RX ADMIN — DOCUSATE SODIUM 100 MG: 100 CAPSULE, LIQUID FILLED ORAL at 22:12

## 2019-04-11 RX ADMIN — MICONAZOLE NITRATE: 2 POWDER TOPICAL at 22:13

## 2019-04-11 RX ADMIN — LISINOPRIL 20 MG: 20 TABLET ORAL at 11:02

## 2019-04-11 RX ADMIN — INSULIN LISPRO 1 UNITS: 100 INJECTION, SOLUTION INTRAVENOUS; SUBCUTANEOUS at 22:12

## 2019-04-11 RX ADMIN — METOPROLOL TARTRATE 50 MG: 50 TABLET, FILM COATED ORAL at 11:01

## 2019-04-11 RX ADMIN — Medication 400 MG: at 22:11

## 2019-04-11 RX ADMIN — Medication 400 MG: at 11:01

## 2019-04-11 ASSESSMENT — PAIN SCALES - GENERAL
PAINLEVEL_OUTOF10: 0

## 2019-04-11 NOTE — CONSULTS
intact, sclera clear, conjunctiva normal  NECK:  Supple, symmetrical, trachea midline, no adenopathy, thyroid symmetric, not enlarged and no tenderness, skin normal  BACK:  Tenderness thoracic spine  LUNGS:  No increased work of breathing, good air exchange, clear to auscultation bilaterally, no crackles or wheezing  CARDIOVASCULAR:  Normal apical impulse, regular rate and rhythm, normal S1 and S2, no S3 or S4, and no murmur noted  ABDOMEN:  No scars, normal bowel sounds, soft, non-distended, non-tender, no masses palpated, no hepatosplenomegally  MUSCULOSKELETAL:  There is no redness, warmth, or swelling of the joints. Full range of motion noted. Motor strength is 5 out of 5 all extremities bilaterally. Tone is normal.  NEUROLOGIC:  Awake, alert, oriented to name, place and time. Sensory is intact    DATA:    CBC:   Lab Results   Component Value Date    WBC 6.9 04/10/2019    RBC 3.92 04/10/2019    HGB 9.6 04/11/2019    HCT 30.5 04/11/2019    MCV 82.1 04/10/2019    MCH 25.5 04/10/2019    MCHC 31.1 04/10/2019     04/10/2019    MPV 9.6 04/10/2019     WBC:    Lab Results   Component Value Date    WBC 6.9 04/10/2019     Hemoglobin/Hematocrit:    Lab Results   Component Value Date    HGB 9.6 04/11/2019    HCT 30.5 04/11/2019     CMP:    Lab Results   Component Value Date     04/10/2019    K 3.5 04/10/2019    CL 98 04/10/2019    CO2 26 04/10/2019    BUN <2 04/10/2019    CREATININE 0.3 04/10/2019    LABGLOM >90 04/10/2019    GLUCOSE 99 04/10/2019    PROT 6.9 04/04/2019    LABALBU 2.6 04/04/2019    CALCIUM 8.0 04/10/2019    BILITOT 0.5 04/04/2019    ALKPHOS 94 04/04/2019    AST 6 04/04/2019    ALT <5 04/04/2019         IMPRESSION/RECOMMENDATIONS:    Assessment: T9-10 discitis/osteomyelitis    Plan:  Discussed with Dr. Lady Gonzalez. Plan for IV antibiotics outpatient. Follow up in 6 weeks after she has finished Iv antibiotics.  If the patient has no improvement after Iv antibiotics possible surgical intervention at that time. [id number] [address] Love for discharge from ortho spine. Shade Person  Palte PAC

## 2019-04-11 NOTE — CARE COORDINATION
4/11/19, 3:05 PM    DISCHARGE BARRIERS    Spoke with facility and gave number to floor if pre-cert comes back all information the chart for discharge. Transport forms, blue envelope and instruction sheet.

## 2019-04-11 NOTE — PROGRESS NOTES
[facility]  INPATIENT SPEECH THERAPY  [facility]    TIME   SLP Individual Minutes  Time In: 6386  Time Out: 2846  Minutes: 17  Timed Code Treatment Minutes: 0 Minutes       [x]Daily Note  []Progress Note  []Discharge Note    Date: 2019  Patient Name: Maria Teresa Vidales        MRN: [id number]    : 1945  (77 y.o.)  Gender: female   Primary Provider: Suzy Boss DO  Admitting Diagnosis:  Osteomyelitis   Secondary Diagnosis: Dysphagia   Precautions: Fall risk, aspiration precautions   Swallowing Status/Diet: Regular diet and thin liquids   Swallowing Strategies: No straws, assistance with feeding as needed s/p fatigue, small bites/sips, close pulmonary monitoring  DATE of last MBS:  N/a   Pain: None reported     Subjective: Pt seen resting in bed with  present. ST coordination with dietary for direct meal monitor with lunch this date. Pt fully alert and engaged. Feeding assistance provided by  to reduce pt frustrations and improve swallow function/meal consumption. SHORT TERM GOAL #1:  Goal 1: Pt will tolerate regular and thin diet withoput s/s of aspiration to ensure safe and adequate nutrition and hydration  INTERVENTIONS: Skilled dysphagia therapy via direct meal intervention. Pt with multiple trials of chicken pasta (x10+), grape tomatoes (x3), grapes (x1) and thin tea by straw x1 and cup x3. Pt demonstration of slow but functional mastication when provided with extra time and liquid wash as needed. Decreased bolus formation with use of skilled strategies (alternating solids/liquids) to maximize oral clearance. Pt with slow AP bolus transfer and with what appeared to be decreased bolus control with concerns for premature pharyngeal spillage. Suspect declined but functional laryngeal elevation. Pt with presence of cough x1 following thin intake by straw. No further overt s/s of aspiration evident following thin consumption by cup.  Skilled level education provided to pt/ regarding ST recommendations to restrict straw use, alternate solids/liquids and continue with small bites to maximize swallow function. Pt with notable expectoration of spinach from pasta and skin of grapes d/t complaints of items \"sticking\" in order cavity. Spoke with pt/ regarding potential for downgrade to dysphagia III diet to restrict these consistencies. Pt/ requesting to continue with regular diet. Pt to utilize her own discretion regarding safe/unsafe solids and to accommodate needs (I.e. Expectorate bolus) as needed. Pt demonstration of good insight. Will continue on regular diet and thin liquids with restriction on straw. ST to follow up for skilled dietary monitor for x1 additional session. **Feedback regarding above provided to RN, Lidia Wolf. She reports lung sounds are clear/mildly diminished at this time.      ASSESSMENT:  Assessment: [x]Progressing towards goals          []Not Progressing towards goals       Patient Tolerance of Treatment:   []Tolerated well [x]Tolerated fair []Required rest breaks []Fatigued  Plan for Next Session: dietary monitor x1   Education:  Learner:  [x]Patient          [x]Significant Other          []Other  Education provided regarding:  [x]Goals and POC   [x]Diet and swallowing precautions    []Home Exercise Program  [x]Progress and/or discharge information  Method of Education:  [x]Discussion          []Demonstration          []Handout          []Other  Evaluation of Education:   [x]Verbalized understanding   []Demonstrates without assistance  []Demonstrates with assistance  [x]Needs further instruction     []No evidence of learning                  []No family present      Plan: [x]Continue with current plan of care    []Modify current plan of care as follows:    []Discharge patient    Discharge Location:    Services/Supervision Recommended:     [x]Patient continues to require treatment by a licensed therapist to address functional deficits as outlined in the established plan of care.     Britany Siemens, M.S. Beryle Pelton 4/11/2019

## 2019-04-11 NOTE — PROGRESS NOTES
lisinopril  20 mg Oral BID    metoprolol tartrate  50 mg Oral BID    insulin lispro  0-6 Units Subcutaneous TID WC    insulin lispro  0-3 Units Subcutaneous Nightly      dextrose       docusate sodium, ondansetron, promethazine, acetaminophen, glucose, dextrose, glucagon (rDNA), dextrose      LABS:     CBC:   Recent Labs     04/10/19  0650 04/11/19  0617   WBC 6.9  --    HGB 10.0* 9.6*     --      BMP:    Recent Labs     04/10/19  0650 04/10/19  2352 04/11/19  0617   *  --  135   K 3.0* 3.5 3.5   CL 98  --  97*   CO2 26  --  28   BUN <2*  --  <2*   CREATININE 0.3*  --  0.3*   GLUCOSE 99  --  117*     Calcium:  Recent Labs     04/11/19  0617   CALCIUM 8.1*     Ionized Calcium:No results for input(s): IONCA in the last 72 hours. Magnesium:  Recent Labs     04/11/19  0617   MG 1.7     Phosphorus:No results for input(s): PHOS in the last 72 hours. BNP:No results for input(s): BNP in the last 72 hours. Glucose:  Recent Labs     04/10/19  2107 04/11/19  0632 04/11/19  1103   POCGLU 148* 116* 155*     HgbA1C: No results for input(s): LABA1C in the last 72 hours. INR:   No results for input(s): INR in the last 72 hours.         Problem list of patient:     Patient Active Problem List   Diagnosis Code    Osteomyelitis ([facility] [address] M86.9    Acute cystitis without hematuria N30.00    Type 2 diabetes mellitus with other specified complication ([facility] [address] Q10.39    Essential hypertension I10    Severe protein-calorie malnutrition ([facility] [address] E43    Examination for normal comparison for clinical research Z00.6    Discitis M46.40    Oliguria R34    Morbid obesity ([facility] [address] E66.01    History of CVA (cerebrovascular accident) Z86.73    Hypoglycemia E16.2    Loose stools R19.5    Hyponatremia E87.1    Hypokalemia E87.6    Moderate malnutrition (HCC) E44.0    Urinary retention R33.9    Recurrent UTI N39.0    Nausea and vomiting R11.2    Pleural effusion on right J90    Hypomagnesemia E83.42    Bilateral atelectasis

## 2019-04-11 NOTE — PROGRESS NOTES
Hospitalist Progress Note      Patient:  Nanette Quesada      Unit/Bed:7K-20/020-A    YOB: 1945    MRN: [id number]       Acct: [de-identified]     PCP: Angela Bowen MD    Date of Admission: 4/4/2019    Assessment/Plan:    1. Discitis/osteomyelitis of the T9-10 thoracic spine s/p biopsy and aspiration on 4/8/2019 with cx growing Proteus mirabilis -- apprec ID assist and feels likely related to recent bacteremia and frequent UTIs -- rocephin 4/5 (s/p zosyn 4/4 - 4/5, vanc 4/4 x 1) -- blood cx 4/4 (-) --  Afebrile w/o leukocytosis  -- c/s ortho spine 4/9 (p) as of 4/11 --> note not available but per  at bedside no plan for surgery at this time  2. N/v -- resolved - po gradually improving -- likely related to infectious processes  -- WBC normal since admission 4/4. Afebrile. LFT 4/4 WNL. -- CT abd/pelvis at [facility] 4/5 (-) acute bowel issue but with #1, small right pleural effusion. -- stopped IVF 4/10 -- on IVF since 4/4  3. urinary retention -- apprec urology assist -- pt and family refused ayers -- Bladder scan 4/5 showed 364 mL of fluid with signs of urinary retention --> Repeat bladder scan on 4/7 again shows urinary retention - pt and family aware of risk of recurrent infections  -- Has recurrent UTIs, UA @ [facility]ve for nitrites, UA here unremarkable for signs of infection  4.  Small right pleural effusion/?acute Pulmonary edema -- Most recent CXR on 4/8 shows evidence for small right pleural effusion and prominent mahin suggesting pulmonary edema and less likely pneumonia --> s/p Lasix 10 mg IV x 1 on 4/9 and cont on home HCTZ 12.5 mg daily  -- CXR 4/10 with picc placed, right effusion ?loculated with bilateral airspace opacity -?edema, atelectasis or pneumonia, mild CM, hilar prominence - ?vascular crowding or underlying lymphadenopathy -> given additional lasix 20 mg IV x 1 4/10 -- repeat CXR 4/12  -- asx and on RA   -- ?need further diuretic   -- ??tap, ??CT chest -- ??check echo  5. Hypoglycemia -- D5/ 1/2 NS started 4/7 and BS stable --> stopped IVF 4/10 -- ontinue to monitor -- last dose of lantus 4/4 and stopped/held since -- cont SSI - ?due to n/v -- monitor and adjust insulin as needed  6. Hyponatremia -- s/p NS 4/4 - 4/6 then changed to D50.9 on 4/7 --> improving 4/10 to 134 -- stopped IVF 4/10 and monitor -- s/p lasix 10 mg IV x 1 on 4/9 and lasix 20 mg IV x 1 on 4/11. Checked TSH 4/11WNL -- ??need to stop HCTZ  7. Hypokalemia -- Replace and monitor -- ?add po supplement  8. Hypomagnesemia -- Replace and monitor prn -- on daily supplement bid  9. Bibasilar atelectasis -- IS -monitor CXR's  10. Hx recurrent UTIs -- likely related to retention -- refuses ayers - urology consulted 4/8 and rec cranberry extract and D mannose  11. Essential hypertension -- stopped IVF 4/10 (on some sort of IVF since 4/4) -- cont norvasc 5 mg daily, hCTZ 12.5 mg daily, lisinopril 20 mg bid, lopressor 50 mg bid -- s/p lasix 10 mg IV 4/9 and repeat lasix 20 mg IV x 1 on 4/10 and BP improving -- cont monitor and adjust prn  12. Type 2 DM -- BS low -- holding home metformin, lantus with n/v -- cont SSI and monitor -- last A1C per [facility] records 2/9/19 = 9.5  13. Normocytic anemia -- down to 9.6 on 4/11 - no signs of loss - monitor -- stable 10-11's -- ?baseline - no signs of bleeding --monitor as was on plavix PTA -- monitor h/h  14. Constipation -- improved - monitor stools - cont stool softeners prn  15. Nephrolithiasis -- noted per CT abd  4/4 at [facility]- no hydronephrosis -- f/u urology - ?also contrib to recurrent infections  16.  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  -- Plavix has been held since admission for need for bx for above --> RESUME if ok with ortho spine and if no plan for further surgery for #1 --> monitor neuro status closely  -- cont statin  17. HLD -- statin  18. Diverticulosis -- no diverticulitis per CT at [facility] 4/4  19. Moderate malnutrition -- per dietician assessment -- NOT severe  20. Morbid Obesity Body mass index is 41.77 kg/m². 21. Generalized weakness -- cont therapies - weaker than baseline per       Dispo  -- 4/11 --> apprec consultants -- per  surgeon PA in and told him no plan for surgery at this time. Awaiting precert for ECF and ID recs for atbx. Cont monitor lytes, renal fxn - repeat CXR tomorrow to eval pleural effusion as given lasix yesterday -- cont monitor BP, BS. Cont monitor po intake and cont therapies. ?resume plavix if no plan for surtery at this time. -- 4/10 --> apprec ID assist -- ortho spine c/s (p) as ?need any further surgical debridement of discitis? ? Await ID recs for atbx tx course. Cont monitor resp status with pleural effusion. Cont replace lytes and monitor po intake. Stop IVF. Monitor BP and cont current meds - if cont trend up may need to increase norvasc. Cont encourage IS. Monitor lytes, renal fxn, BP, BS, resp status. SS following as requesting SNF for IV atbx and therapies. NEED to resume plavix if no further procedures planned per surgery. Chief Complaint: n/v    Hospital Course: Maryse Alexis is a 72 y.o. female with hx of CVA with right hemiparesis, dysphagia and WC bound and mobilizes via criss lift, DM2, CKD, GERD, HTN transferred from [facility] for possible discitis at T8-9 with possible OM. ~6 weeks prior admitted there for Proteus bacteremia. Presented to [facility] for 3 weeks of n/v.  ID consulted on admission - Dr. Rajiv vasquez done - s/p IR bx of T9-10 disc space aspiration and bx which is growing proteus mirabilis. On IV rocephin since 4/5. Repeat blood cx on admission (-).  N/v improving. Subjective:   -- 4/11/2019  --> pt states she is feeling better than she has in an long time. Eating better per . No further n/v so far today. No abd pain this am.  No back pain. Denies cp, sob. Denies f/c.  Bandar po. On RA. Afebrile. Last BM 4/10 x 1. Medications:  Reviewed    Infusion Medications    dextrose       Scheduled Medications    magnesium replacement protocol   Other RX Placeholder    potassium (CARDIAC) replacement protocol   Other RX Placeholder    Magnesium Oxide  400 mg Oral BID    docusate sodium  100 mg Oral BID    ceFAZolin  1 g Intravenous 60 Min Pre-Op    iohexol  20 mL Other Once    pantoprazole  40 mg Oral QAM AC    miconazole   Topical BID    cefTRIAXone (ROCEPHIN) IV  2 g Intravenous Q24H    lactobacillus  1 capsule Oral Daily with breakfast    amLODIPine  5 mg Oral Daily    atorvastatin  20 mg Oral Nightly    calcium elemental  500 mg Oral Daily    citalopram  10 mg Oral Daily    hydrochlorothiazide  12.5 mg Oral Daily    lisinopril  20 mg Oral BID    metoprolol tartrate  50 mg Oral BID    insulin lispro  0-6 Units Subcutaneous TID WC    insulin lispro  0-3 Units Subcutaneous Nightly     PRN Meds: docusate sodium, ondansetron, promethazine, acetaminophen, glucose, dextrose, glucagon (rDNA), dextrose      Intake/Output Summary (Last 24 hours) at 4/11/2019 0700  Last data filed at 4/10/2019 2146  Gross per 24 hour   Intake 2013.15 ml   Output --   Net 2013.15 ml       Diet:  Dietary Nutrition Supplements: Frozen Oral Supplement  DIET CARB CONTROL; Exam:  BP (!) 149/65   Pulse 70   Temp 98.8 °F (37.1 °C) (Oral)   Resp 16   Ht 5' (1.524 m)   Wt 213 lb 14.4 oz (97 kg) Comment: pts  states he thinks she weighs less, will zero bed next time pt is out of it  LMP  (LMP Unknown) Comment: post menopase  SpO2 91%   Breastfeeding? No   BMI 41.77 kg/m²     General appearance: No apparent distress, appears much older than stated age and cooperative. HEENT: Pupils equal, round, and reactive to light. Conjunctivae/corneas clear.   Neck: Supple, with full range of motion. No jugular venous distention. Trachea midline. Respiratory:  Normal respiratory effort. Diminished in bases but Clear to auscultation, bilaterally without Rales/Wheezes/Rhonchi. No respiratory distress or accessory muscle use. Cardiovascular: Regular rate and rhythm with normal S1/S2, 2/6 YENY, No rubs or gallops. Abdomen: Soft, non-tender, non-distended with normal bowel sounds. No rebound or guarding  Musculoskeletal: trace BLE edema, no TTP, RLE weaker than LLE  Skin: Skin color, texture, turgor normal.  No rashes or lesions. Neurologic:  Slow speech, right hemiparesis - no other focal weakness  Psychiatric: Alert and oriented, thought content appropriate  Capillary Refill: Brisk,< 3 seconds   Peripheral Pulses: +1 palpable, equal bilaterally       Labs:   Recent Labs     04/08/19  0828 04/10/19  0650 04/11/19 0617   WBC 7.4 6.9  --    HGB 11.0* 10.0* 9.6*   HCT 34.4* 32.2* 30.5*    264  --      Recent Labs     04/10/19  0650 04/10/19  2352 04/11/19 0617   *  --  135   K 3.0* 3.5 3.5   CL 98  --  97*   CO2 26  --  28   BUN <2*  --  <2*   CREATININE 0.3*  --  0.3*   CALCIUM 8.0*  --  8.1*     No results for input(s): AST, ALT, BILIDIR, BILITOT, ALKPHOS in the last 72 hours. Recent Labs     04/08/19 0828   INR 1.49*     No results for input(s): Rosario Chiang in the last 72 hours. Urinalysis:      Lab Results   Component Value Date    NITRU NEGATIVE 04/05/2019    BLOODU NEGATIVE 04/05/2019    GLUCOSEU NEGATIVE 04/05/2019       Radiology:  XR CHEST PORTABLE   Final Result         1. Interval placement of left PICC line with tip in the SVC. 2. Right pleural effusion possibly loculated. Bilateral airspace opacity correlate for edema, atelectasis or pneumonia. 3. Mild cardiomegaly.  Prominent of the hilar structures bilaterally which may be secondary to vascular crowding or underlying lymphadenopathy            **This report has been created using voice recognition software. It may contain minor errors which are inherent in voice recognition technology. **      Final report electronically signed by Dr. Jonny Can on 4/10/2019 2:17 PM      XR CHEST PORTABLE   Final Result   1. Small right-sided pleural effusion. 2. Bibasilar consolidative airspace opacities are present and may represent atelectasis or pneumonia. 3. Prominent appearance of the bilateral mahin suggesting underlying pulmonary vascular congestion versus lymphadenopathy. 4. Mild enlargement of the cardiac mediastinal silhouette. 5. No pneumothorax is seen. **This report has been created using voice recognition software. It may contain minor errors which are inherent in voice recognition technology. **      Final report electronically signed by Dr. Marisol Lomax on 4/8/2019 7:21 PM      IR FLUORO GUIDED NEEDLE PLACEMENT   Final Result   1. Successful fluoroscopic-guided T9-T10 intervertebral disc space aspiration and biopsy as described above. **This report has been created using voice recognition software. It may contain minor errors which are inherent in voice recognition technology. **      Final report electronically signed by Dr. Marisol Lomax on 4/8/2019 7:39 PM      MRI COMPARISON OF OUTSIDE FILMS   Final Result      CT COMPARISON OF OUTSIDE FILMS   Final Result          Diet: Dietary Nutrition Supplements: Frozen Oral Supplement  DIET CARB CONTROL;     Lloyd: no    Microbiology:  Blood cx 4/4/19 (-)    T10 disc space aspiration/bx 4/8/19 = Proteus mirabilis    Tele: SR with PACs    DVT prophylaxis: [] Lovenox                                 [x] SCDs --> added 4/10                                 [] SQ Heparin                                 [] Encourage ambulation           [] Already on Anticoagulation     Disposition:    [] Home       [] TCU       [] Rehab       [] Psych       [x] SNF       [] [facility]       [] Other-    Code Status: Full Code    PT/OT Eval Status: following        Electronically signed by Rosa Fowler MD on 4/11/2019 at 7:00 AM

## 2019-04-12 ENCOUNTER — APPOINTMENT (OUTPATIENT)
Dept: GENERAL RADIOLOGY | Age: 74
DRG: 477 | End: 2019-04-12
Attending: INTERNAL MEDICINE
Payer: MEDICARE

## 2019-04-12 LAB
ALBUMIN SERPL-MCNC: 1.8 G/DL (ref 3.5–5.1)
ALP BLD-CCNC: 90 U/L (ref 38–126)
ALT SERPL-CCNC: < 5 U/L (ref 11–66)
ANION GAP SERPL CALCULATED.3IONS-SCNC: 12 MEQ/L (ref 8–16)
AST SERPL-CCNC: < 5 U/L (ref 5–40)
BASOPHILS # BLD: 0.5 %
BASOPHILS ABSOLUTE: 0 THOU/MM3 (ref 0–0.1)
BILIRUB SERPL-MCNC: 0.4 MG/DL (ref 0.3–1.2)
BILIRUBIN DIRECT: < 0.2 MG/DL (ref 0–0.3)
BUN BLDV-MCNC: 4 MG/DL (ref 7–22)
CALCIUM SERPL-MCNC: 8 MG/DL (ref 8.5–10.5)
CHLORIDE BLD-SCNC: 95 MEQ/L (ref 98–111)
CO2: 27 MEQ/L (ref 23–33)
CREAT SERPL-MCNC: 0.3 MG/DL (ref 0.4–1.2)
EOSINOPHIL # BLD: 1.2 %
EOSINOPHILS ABSOLUTE: 0.1 THOU/MM3 (ref 0–0.4)
ERYTHROCYTE [DISTWIDTH] IN BLOOD BY AUTOMATED COUNT: 16.3 % (ref 11.5–14.5)
ERYTHROCYTE [DISTWIDTH] IN BLOOD BY AUTOMATED COUNT: 48.6 FL (ref 35–45)
GFR SERPL CREATININE-BSD FRML MDRD: > 90 ML/MIN/1.73M2
GLUCOSE BLD-MCNC: 122 MG/DL (ref 70–108)
GLUCOSE BLD-MCNC: 122 MG/DL (ref 70–108)
GLUCOSE BLD-MCNC: 150 MG/DL (ref 70–108)
GLUCOSE BLD-MCNC: 155 MG/DL (ref 70–108)
GLUCOSE BLD-MCNC: 157 MG/DL (ref 70–108)
HCT VFR BLD CALC: 29.7 % (ref 37–47)
HEMOGLOBIN: 9.5 GM/DL (ref 12–16)
IMMATURE GRANS (ABS): 0.03 THOU/MM3 (ref 0–0.07)
IMMATURE GRANULOCYTES: 0.5 %
LYMPHOCYTES # BLD: 29.5 %
LYMPHOCYTES ABSOLUTE: 1.9 THOU/MM3 (ref 1–4.8)
MAGNESIUM: 1.4 MG/DL (ref 1.6–2.4)
MCH RBC QN AUTO: 26.2 PG (ref 26–33)
MCHC RBC AUTO-ENTMCNC: 32 GM/DL (ref 32.2–35.5)
MCV RBC AUTO: 82 FL (ref 81–99)
MONOCYTES # BLD: 13.5 %
MONOCYTES ABSOLUTE: 0.9 THOU/MM3 (ref 0.4–1.3)
NUCLEATED RED BLOOD CELLS: 0 /100 WBC
PLATELET # BLD: 256 THOU/MM3 (ref 130–400)
PMV BLD AUTO: 10 FL (ref 9.4–12.4)
POTASSIUM SERPL-SCNC: 3.4 MEQ/L (ref 3.5–5.2)
RBC # BLD: 3.62 MILL/MM3 (ref 4.2–5.4)
SEG NEUTROPHILS: 54.8 %
SEGMENTED NEUTROPHILS ABSOLUTE COUNT: 3.5 THOU/MM3 (ref 1.8–7.7)
SODIUM BLD-SCNC: 134 MEQ/L (ref 135–145)
TOTAL PROTEIN: 5.7 G/DL (ref 6.1–8)
WBC # BLD: 6.4 THOU/MM3 (ref 4.8–10.8)

## 2019-04-12 PROCEDURE — 82248 BILIRUBIN DIRECT: CPT

## 2019-04-12 PROCEDURE — 71045 X-RAY EXAM CHEST 1 VIEW: CPT

## 2019-04-12 PROCEDURE — 80053 COMPREHEN METABOLIC PANEL: CPT

## 2019-04-12 PROCEDURE — 1200000003 HC TELEMETRY R&B

## 2019-04-12 PROCEDURE — 82948 REAGENT STRIP/BLOOD GLUCOSE: CPT

## 2019-04-12 PROCEDURE — 6370000000 HC RX 637 (ALT 250 FOR IP): Performed by: PHYSICIAN ASSISTANT

## 2019-04-12 PROCEDURE — 99232 SBSQ HOSP IP/OBS MODERATE 35: CPT | Performed by: FAMILY MEDICINE

## 2019-04-12 PROCEDURE — 36415 COLL VENOUS BLD VENIPUNCTURE: CPT

## 2019-04-12 PROCEDURE — 83735 ASSAY OF MAGNESIUM: CPT

## 2019-04-12 PROCEDURE — 2580000003 HC RX 258: Performed by: INTERNAL MEDICINE

## 2019-04-12 PROCEDURE — 6360000002 HC RX W HCPCS: Performed by: INTERNAL MEDICINE

## 2019-04-12 PROCEDURE — 6370000000 HC RX 637 (ALT 250 FOR IP): Performed by: INTERNAL MEDICINE

## 2019-04-12 PROCEDURE — 6370000000 HC RX 637 (ALT 250 FOR IP): Performed by: FAMILY MEDICINE

## 2019-04-12 PROCEDURE — 85025 COMPLETE CBC W/AUTO DIFF WBC: CPT

## 2019-04-12 RX ADMIN — POTASSIUM BICARBONATE 20 MEQ: 782 TABLET, EFFERVESCENT ORAL at 17:04

## 2019-04-12 RX ADMIN — CEFTRIAXONE SODIUM 2 G: 2 INJECTION, POWDER, FOR SOLUTION INTRAMUSCULAR; INTRAVENOUS at 12:39

## 2019-04-12 RX ADMIN — CLOPIDOGREL BISULFATE 75 MG: 75 TABLET ORAL at 08:18

## 2019-04-12 RX ADMIN — INSULIN LISPRO 1 UNITS: 100 INJECTION, SOLUTION INTRAVENOUS; SUBCUTANEOUS at 20:52

## 2019-04-12 RX ADMIN — CITALOPRAM 10 MG: 20 TABLET, FILM COATED ORAL at 08:17

## 2019-04-12 RX ADMIN — Medication 400 MG: at 08:17

## 2019-04-12 RX ADMIN — MICONAZOLE NITRATE: 2 POWDER TOPICAL at 20:52

## 2019-04-12 RX ADMIN — ATORVASTATIN CALCIUM 20 MG: 20 TABLET, FILM COATED ORAL at 20:52

## 2019-04-12 RX ADMIN — CALCIUM 500 MG: 500 TABLET ORAL at 08:18

## 2019-04-12 RX ADMIN — LISINOPRIL 20 MG: 20 TABLET ORAL at 20:52

## 2019-04-12 RX ADMIN — Medication 400 MG: at 20:52

## 2019-04-12 RX ADMIN — AMLODIPINE BESYLATE 5 MG: 5 TABLET ORAL at 08:18

## 2019-04-12 RX ADMIN — LISINOPRIL 20 MG: 20 TABLET ORAL at 08:17

## 2019-04-12 RX ADMIN — ONDANSETRON 4 MG: 2 INJECTION INTRAMUSCULAR; INTRAVENOUS at 08:24

## 2019-04-12 RX ADMIN — MICONAZOLE NITRATE: 2 POWDER TOPICAL at 08:19

## 2019-04-12 RX ADMIN — PANTOPRAZOLE SODIUM 40 MG: 40 TABLET, DELAYED RELEASE ORAL at 06:24

## 2019-04-12 RX ADMIN — ACETAMINOPHEN 1000 MG: 500 TABLET, FILM COATED ORAL at 08:18

## 2019-04-12 RX ADMIN — METOPROLOL TARTRATE 50 MG: 50 TABLET, FILM COATED ORAL at 08:19

## 2019-04-12 RX ADMIN — Medication 1 CAPSULE: at 08:17

## 2019-04-12 RX ADMIN — METOPROLOL TARTRATE 50 MG: 50 TABLET, FILM COATED ORAL at 20:52

## 2019-04-12 RX ADMIN — HYDROCHLOROTHIAZIDE 12.5 MG: 12.5 CAPSULE ORAL at 08:18

## 2019-04-12 ASSESSMENT — PAIN SCALES - GENERAL
PAINLEVEL_OUTOF10: 0
PAINLEVEL_OUTOF10: 0
PAINLEVEL_OUTOF10: 2
PAINLEVEL_OUTOF10: 2

## 2019-04-12 ASSESSMENT — PAIN DESCRIPTION - PAIN TYPE: TYPE: ACUTE PAIN

## 2019-04-12 ASSESSMENT — PAIN DESCRIPTION - ORIENTATION: ORIENTATION: LOWER

## 2019-04-12 ASSESSMENT — PAIN DESCRIPTION - LOCATION: LOCATION: BACK

## 2019-04-12 NOTE — PLAN OF CARE
Problem: Falls - Risk of:  Goal: Will remain free from falls  Description  Will remain free from falls  Outcome: Ongoing  Note:   No falls this shift has not attempted to get up out of bed per self     Problem: Daily Care:  Goal: Daily care needs are met  Description  Daily care needs are met  Outcome: Ongoing  Note:   Moderate amounts of assistance with adls     Problem: Pain:  Goal: Patient's pain/discomfort is manageable  Description  Patient's pain/discomfort is manageable  Outcome: Ongoing  Note:   Denies any pain states she is reaching pain goal of a 0/10     Problem: Discharge Planning:  Goal: Patients continuum of care needs are met  Description  Patients continuum of care needs are met  Outcome: Ongoing  Note:   Planning ECF when precert is approved      Problem: Nutrition  Goal: Optimal nutrition therapy  Outcome: Ongoing  Note:   Taking in adequate amounts of nutrition      Problem: GI  Goal: No bowel complications  Outcome: Ongoing  Note:   Abdomen soft active bowel sounds      Problem:   Goal: Adequate urinary output  Outcome: Ongoing  Note:   Voiding adequate amounts of clear yellow urine      Problem: Infection - Central Venous Catheter-Associated Bloodstream Infection:  Goal: Will show no infection signs and symptoms  Description  Will show no infection signs and symptoms  Outcome: Ongoing  Note:   She is afebrile no other ss of infection noted    Care plan reviewed with patient and . Patient and   verbalize understanding of the plan of care and contribute to goal setting.

## 2019-04-12 NOTE — PROGRESS NOTES
[address] [address]  SPEECH THERAPY MISSED TREATMENT NOTE  [facility]      Date: 2019  Patient Name: Jeanie Bonilla        MRN: [id number]    : 1945  (72 y.o.)    REASON FOR MISSED TREATMENT:    Pt declined po trials this date due to upset stomach. Will check back as schedule allows.     Jael COLE/[id number]

## 2019-04-12 NOTE — PROGRESS NOTES
Hospitalist Progress Note      Patient:  Lanette Monroy      Unit/Bed:7K-20/020-A    YOB: 1945    MRN: [id number]       Acct: [de-identified]     PCP: Katty Zaragoza MD    Date of Admission: 4/4/2019    Assessment/Plan:    1. Discitis/osteomyelitis of the T9-10 thoracic spine s/p biopsy and aspiration on 4/8/2019 with cx growing Proteus mirabilis -- apprec ID assist and feels likely related to recent bacteremia and frequent UTIs -- rocephin 4/5 (s/p zosyn 4/4 - 4/5, vanc 4/4 x 1) -- blood cx 4/4 (-) --  Afebrile w/o leukocytosis  -- c/s ortho spine -> rec cont IV atbx x 6 weeks and f/u after atbx and if no improvement may need possible intervention  2. N/v -- improved and then worsened again 4/12 am - check LFT - WBC WNL, minor electrolyte abn -- likely related to infectious processes  -- WBC normal since admission 4/4. Afebrile. LFT 4/4 WNL. -- CT abd/pelvis at [facility] 4/5 (-) acute bowel issue but with #1, small right pleural effusion. -- stopped IVF 4/10 -- on IVF since 4/4  3. urinary retention -- apprec urology assist -- pt and family refused ayers -- Bladder scan 4/5 showed 364 mL of fluid with signs of urinary retention --> Repeat bladder scan on 4/7 again shows urinary retention - pt and family aware of risk of recurrent infections  -- Has recurrent UTIs, UA @ [facility]ve for nitrites, UA here unremarkable for signs of infection  4.  Small right pleural effusion/?acute Pulmonary edema -- Most recent CXR on 4/8 shows evidence for small right pleural effusion and prominent mahin suggesting pulmonary edema and less likely pneumonia --> s/p Lasix 10 mg IV x 1 on 4/9 and cont on home HCTZ 12.5 mg daily  -- CXR 4/10 with picc placed, right effusion ?loculated with bilateral airspace opacity -?edema, atelectasis or pneumonia, mild CM, hilar prominence - ?vascular crowding or underlying lymphadenopathy -> given additional lasix 20 mg IV x 1 4/10 -- repeat CXR 4/12 with persistent effusion   -- asx and on RA -- just monitor - ??chronic effusion -- no prior to compare   -- ?need further diuretic   -- ??tap, ??CT chest   -- check echo 4/12  5. RLL atelectasis -- IS as able - ST following -- already on rocephin  6. Hypoglycemia -- D5/1/2 NS started 4/7 and BS stable --> stopped IVF 4/10 -- ontinue to monitor -- last dose of lantus 4/4 and stopped/held since -- cont SSI - ?due to n/v -- monitor and adjust insulin as needed  7. Hyponatremia -- s/p NS 4/4 - 4/6 then changed to D50.9 on 4/7 --> improving 4/10 to 134 -- stopped IVF 4/10 and monitor -- s/p lasix 10 mg IV x 1 on 4/9 and lasix 20 mg IV x 1 on 4/11. Checked TSH 4/11WNL -- ??need to stop HCTZ  8. Hypokalemia -- Replace and monitor -- added po supplement 20 mEq daily 4/12  9. Hypomagnesemia -- Replace and monitor prn -- on daily supplement bid  10. Bibasilar atelectasis -- IS -monitor CXR's  11. Hx recurrent UTIs -- likely related to retention -- refuses ayers - urology consulted 4/8 and rec cranberry extract and D mannose  12. Essential hypertension -- stopped IVF 4/10 (on some sort of IVF since 4/4) -- cont norvasc 5 mg daily, hCTZ 12.5 mg daily, lisinopril 20 mg bid, lopressor 50 mg bid -- s/p lasix 10 mg IV 4/9 and repeat lasix 20 mg IV x 1 on 4/10 and BP improving -- cont monitor and adjust prn  13. Type 2 DM -- BS low -- holding home metformin, lantus with n/v -- cont SSI and monitor -- last A1C per [facility] 2/9/19 = 9.5  14. Normocytic anemia -- down to 9.6 on 4/11  And stable at 9.5 on 4/12 - no signs of loss - monitor -- stable 10-11's -- ?baseline - no signs of bleeding --monitor as was on plavix PTA -- monitor h/h  15. Constipation -- improved - monitor stools - cont stool softeners prn  16. Nephrolithiasis -- noted per CT abd  4/4 at [facility]- no hydronephrosis -- f/u urology - ?also contrib to recurrent infections  17.  Hx CVA with residual right hemiparesis and dysphagia -- ST, PT/OT following -- per  pt transfers at home via criss lift and gets around via [facility] -- seems to be at baseline but ??little weaker than normal.  -- Plavix has been held since admission for need for bx for above --> resumed 4/12 as per surgery no plan for surgery at this time. -- cont statin  18. HLD -- statin  19. Diverticulosis -- no diverticulitis per CT at [facility] 4/4  20. Moderate malnutrition -- per dietician assessment -- NOT severe  21. Morbid Obesity Body mass index is 41.77 kg/m². 22. Generalized weakness -- cont therapies - weaker than baseline per       Dispo  -- 4/12 --> apprec consultants -- increase nausea this am - ?etiology - ?pain related; Check LFT. Having BM's.  ?check KUB if persists. Cont replace and monitor lytes. Monitor BP, BS, monitor GI sx. Add po KCl daily supplement. Resume home plavix. Check echo. Awaiting precert for ECF    -- 1/36 --> apprec consultants -- per  surgeon PA in and told him no plan for surgery at this time. Awaiting precert for ECF and ID recs for atbx. Cont monitor lytes, renal fxn - repeat CXR tomorrow to eval pleural effusion as given lasix yesterday -- cont monitor BP, BS. Cont monitor po intake and cont therapies. ?resume plavix if no plan for surtery at this time. -- 4/10 --> apprec ID assist -- ortho spine c/s (p) as ?need any further surgical debridement of discitis? ? Await ID recs for atbx tx course. Cont monitor resp status with pleural effusion. Cont replace lytes and monitor po intake. Stop IVF. Monitor BP and cont current meds - if cont trend up may need to increase norvasc. Cont encourage IS. Monitor lytes, renal fxn, BP, BS, resp status. SS following as requesting SNF for IV atbx and therapies. NEED to resume plavix if no further procedures planned per surgery.             Chief Complaint: n/v    Hospital Course: Miguel Angel Carroll is a 72 y.o. female with hx of CVA with right hemiparesis, dysphagia and WC bound and mobilizes via criss lift, DM2, ALKPHOS in the last 72 hours. No results for input(s): INR in the last 72 hours. No results for input(s): Clarance Calderon in the last 72 hours. Urinalysis:      Lab Results   Component Value Date    NITRU NEGATIVE 04/05/2019    BLOODU NEGATIVE 04/05/2019    GLUCOSEU NEGATIVE 04/05/2019       Radiology:  XR CHEST PORTABLE   Final Result   1. Persistent right lower lobe atelectasis and or infiltrate changes with associated effusion. Overall appearance is unchanged compared to prior study         **This report has been created using voice recognition software. It may contain minor errors which are inherent in voice recognition technology. **      Final report electronically signed by Dr. Kaye Valero on 4/12/2019 5:17 AM      RADIOLOGY REPORT   Final Result      XR CHEST PORTABLE   Final Result         1. Interval placement of left PICC line with tip in the SVC. 2. Right pleural effusion possibly loculated. Bilateral airspace opacity correlate for edema, atelectasis or pneumonia. 3. Mild cardiomegaly. Prominent of the hilar structures bilaterally which may be secondary to vascular crowding or underlying lymphadenopathy            **This report has been created using voice recognition software. It may contain minor errors which are inherent in voice recognition technology. **      Final report electronically signed by Dr. Carlito Cross on 4/10/2019 2:17 PM      XR CHEST PORTABLE   Final Result   1. Small right-sided pleural effusion. 2. Bibasilar consolidative airspace opacities are present and may represent atelectasis or pneumonia. 3. Prominent appearance of the bilateral mahin suggesting underlying pulmonary vascular congestion versus lymphadenopathy. 4. Mild enlargement of the cardiac mediastinal silhouette. 5. No pneumothorax is seen. **This report has been created using voice recognition software. It may contain minor errors which are inherent in voice recognition technology. ** Final report electronically signed by Dr. Sherlyn Garcias on 4/8/2019 7:21 PM      IR FLUORO GUIDED NEEDLE PLACEMENT   Final Result   1. Successful fluoroscopic-guided T9-T10 intervertebral disc space aspiration and biopsy as described above. **This report has been created using voice recognition software. It may contain minor errors which are inherent in voice recognition technology. **      Final report electronically signed by Dr. Sherlyn Garcias on 4/8/2019 7:39 PM      MRI COMPARISON OF OUTSIDE FILMS   Final Result      CT COMPARISON OF OUTSIDE FILMS   Final Result          Diet: Dietary Nutrition Supplements: Frozen Oral Supplement  DIET CARB CONTROL; No Drinking Straw    Lloyd: no    Microbiology:  Blood cx 4/4/19 (-)    T10 disc space aspiration/bx 4/8/19 = Proteus mirabilis    Tele: SR with PACs    DVT prophylaxis: [] Lovenox                                 [x] SCDs --> added 4/10                                 [] SQ Heparin                                 [] Encourage ambulation           [] Already on Anticoagulation     Disposition:    [] Home       [] TCU       [] Rehab       [] Psych       [x] SNF       [] [facility]       [] Other-    Code Status: Full Code    PT/OT Eval Status: following        Electronically signed by Charu Power MD on 4/12/2019 at 7:48 AM

## 2019-04-12 NOTE — CARE COORDINATION
04/12/19  1:51 PM  Clinical update: Will require IV rocephin daily for 6 weeks. Denial for ECF received as patient is at baseline. Spoke with  and patient re: home infusions as patient is unable to leave home daily for OP infusions. Reports patient is current with [facility] and he is willing to learn how to administer infusions. Agreeable to [facility] for infusions since they contract with [employer]. Message left for Marilynn at [facility] regarding referral, possible weekend discharge, meds, and contact numbers for CM and unit. Referral also faxed-transmission verification received. Weekend discharge instructions left on chart with [facility] contact information.

## 2019-04-12 NOTE — PROGRESS NOTES
Nightly    calcium elemental  500 mg Oral Daily    citalopram  10 mg Oral Daily    hydrochlorothiazide  12.5 mg Oral Daily    lisinopril  20 mg Oral BID    metoprolol tartrate  50 mg Oral BID    insulin lispro  0-6 Units Subcutaneous TID WC    insulin lispro  0-3 Units Subcutaneous Nightly      dextrose       docusate sodium, ondansetron, promethazine, acetaminophen, glucose, dextrose, glucagon (rDNA), dextrose      LABS:     CBC:   Recent Labs     04/10/19  0650 04/11/19  0617 04/12/19  0651   WBC 6.9  --  6.4   HGB 10.0* 9.6* 9.5*     --  256     BMP:    Recent Labs     04/10/19  0650 04/10/19  2352 04/11/19  0617 04/12/19  0651   *  --  135 134*   K 3.0* 3.5 3.5 3.4*   CL 98  --  97* 95*   CO2 26  --  28 27   BUN <2*  --  <2* 4*   CREATININE 0.3*  --  0.3* 0.3*   GLUCOSE 99  --  117* 122*     Calcium:  Recent Labs     04/12/19  0651   CALCIUM 8.0*     Ionized Calcium:No results for input(s): IONCA in the last 72 hours. Magnesium:  Recent Labs     04/12/19  0651   MG 1.4*     Phosphorus:No results for input(s): PHOS in the last 72 hours. BNP:No results for input(s): BNP in the last 72 hours. Glucose:  Recent Labs     04/12/19  0623 04/12/19  1104 04/12/19  1606   POCGLU 122* 157* 150*     HgbA1C: No results for input(s): LABA1C in the last 72 hours. INR:   No results for input(s): INR in the last 72 hours.         Problem list of patient:     Patient Active Problem List   Diagnosis Code    Osteomyelitis ([facility] M86.9    Acute cystitis without hematuria N30.00    Type 2 diabetes mellitus with other specified complication ([facility] W60.09    Essential hypertension I10    Severe protein-calorie malnutrition ([facility] E43    Examination for normal comparison for clinical research Z00.6    Discitis M46.40    Oliguria R34    Morbid obesity ([address] E66.01    History of CVA (cerebrovascular accident) Z86.73    Hypoglycemia E16.2    Loose stools R19.5    Hyponatremia E87.1    Hypokalemia E87.6    Moderate malnutrition (HCC) E44.0    Urinary retention R33.9    Recurrent UTI N39.0    Nausea and vomiting R11.2    Pleural effusion on right J90    Hypomagnesemia E83.42    Bilateral atelectasis J98.11    Normocytic anemia D64.9    Generalized weakness R53.1         ASSESSMENT/PLAN   · Discitis/osteomyelitis of the thoracic spine: biopsy growing proteus  · History of recurrent urinary tract infection with recent history of Bacteremia due to Proteus. likely source for the discites  · History of CVA with right-sided weakness  · Limited mobility with deconditioning. · She'll be discharged home to continue IV antibiotics for a total of 6 weeks.   · Follow-up will be scheduled in 2 weeks at the wound clinic/ID clinic          Alex Alexandra MD, [address] 4/12/2019 5:45 PM

## 2019-04-13 LAB
ANION GAP SERPL CALCULATED.3IONS-SCNC: 11 MEQ/L (ref 8–16)
BUN BLDV-MCNC: 6 MG/DL (ref 7–22)
CALCIUM SERPL-MCNC: 8.4 MG/DL (ref 8.5–10.5)
CHLORIDE BLD-SCNC: 94 MEQ/L (ref 98–111)
CO2: 29 MEQ/L (ref 23–33)
CREAT SERPL-MCNC: 0.3 MG/DL (ref 0.4–1.2)
GFR SERPL CREATININE-BSD FRML MDRD: > 90 ML/MIN/1.73M2
GLUCOSE BLD-MCNC: 122 MG/DL (ref 70–108)
GLUCOSE BLD-MCNC: 131 MG/DL (ref 70–108)
GLUCOSE BLD-MCNC: 183 MG/DL (ref 70–108)
GLUCOSE BLD-MCNC: 194 MG/DL (ref 70–108)
GLUCOSE BLD-MCNC: 203 MG/DL (ref 70–108)
HCT VFR BLD CALC: 31.3 % (ref 37–47)
HEMOGLOBIN: 9.8 GM/DL (ref 12–16)
LV EF: 58 %
LVEF MODALITY: NORMAL
MAGNESIUM: 1.5 MG/DL (ref 1.6–2.4)
POTASSIUM SERPL-SCNC: 4 MEQ/L (ref 3.5–5.2)
SODIUM BLD-SCNC: 134 MEQ/L (ref 135–145)

## 2019-04-13 PROCEDURE — 6370000000 HC RX 637 (ALT 250 FOR IP): Performed by: FAMILY MEDICINE

## 2019-04-13 PROCEDURE — 36415 COLL VENOUS BLD VENIPUNCTURE: CPT

## 2019-04-13 PROCEDURE — 83735 ASSAY OF MAGNESIUM: CPT

## 2019-04-13 PROCEDURE — 99232 SBSQ HOSP IP/OBS MODERATE 35: CPT | Performed by: FAMILY MEDICINE

## 2019-04-13 PROCEDURE — 6370000000 HC RX 637 (ALT 250 FOR IP): Performed by: INTERNAL MEDICINE

## 2019-04-13 PROCEDURE — 6370000000 HC RX 637 (ALT 250 FOR IP): Performed by: PHYSICIAN ASSISTANT

## 2019-04-13 PROCEDURE — 6360000002 HC RX W HCPCS: Performed by: INTERNAL MEDICINE

## 2019-04-13 PROCEDURE — 80048 BASIC METABOLIC PNL TOTAL CA: CPT

## 2019-04-13 PROCEDURE — 93306 TTE W/DOPPLER COMPLETE: CPT

## 2019-04-13 PROCEDURE — 82948 REAGENT STRIP/BLOOD GLUCOSE: CPT

## 2019-04-13 PROCEDURE — 85014 HEMATOCRIT: CPT

## 2019-04-13 PROCEDURE — 1200000003 HC TELEMETRY R&B

## 2019-04-13 PROCEDURE — 85018 HEMOGLOBIN: CPT

## 2019-04-13 PROCEDURE — 2580000003 HC RX 258: Performed by: INTERNAL MEDICINE

## 2019-04-13 RX ORDER — POTASSIUM CHLORIDE 20 MEQ/1
40 TABLET, EXTENDED RELEASE ORAL ONCE
Status: COMPLETED | OUTPATIENT
Start: 2019-04-13 | End: 2019-04-13

## 2019-04-13 RX ADMIN — CITALOPRAM 10 MG: 20 TABLET, FILM COATED ORAL at 09:39

## 2019-04-13 RX ADMIN — HYDROCHLOROTHIAZIDE 12.5 MG: 12.5 CAPSULE ORAL at 09:39

## 2019-04-13 RX ADMIN — POTASSIUM CHLORIDE 40 MEQ: 20 TABLET, EXTENDED RELEASE ORAL at 09:40

## 2019-04-13 RX ADMIN — Medication 400 MG: at 09:40

## 2019-04-13 RX ADMIN — POTASSIUM BICARBONATE 20 MEQ: 782 TABLET, EFFERVESCENT ORAL at 09:40

## 2019-04-13 RX ADMIN — INSULIN LISPRO 1 UNITS: 100 INJECTION, SOLUTION INTRAVENOUS; SUBCUTANEOUS at 22:54

## 2019-04-13 RX ADMIN — AMLODIPINE BESYLATE 5 MG: 5 TABLET ORAL at 09:39

## 2019-04-13 RX ADMIN — CLOPIDOGREL BISULFATE 75 MG: 75 TABLET ORAL at 09:40

## 2019-04-13 RX ADMIN — METOPROLOL TARTRATE 50 MG: 50 TABLET, FILM COATED ORAL at 22:54

## 2019-04-13 RX ADMIN — PANTOPRAZOLE SODIUM 40 MG: 40 TABLET, DELAYED RELEASE ORAL at 09:40

## 2019-04-13 RX ADMIN — CALCIUM 500 MG: 500 TABLET ORAL at 09:39

## 2019-04-13 RX ADMIN — Medication 1 CAPSULE: at 09:39

## 2019-04-13 RX ADMIN — CEFTRIAXONE SODIUM 2 G: 2 INJECTION, POWDER, FOR SOLUTION INTRAMUSCULAR; INTRAVENOUS at 01:36

## 2019-04-13 RX ADMIN — MICONAZOLE NITRATE: 2 POWDER TOPICAL at 09:40

## 2019-04-13 RX ADMIN — METOPROLOL TARTRATE 50 MG: 50 TABLET, FILM COATED ORAL at 09:39

## 2019-04-13 RX ADMIN — LISINOPRIL 20 MG: 20 TABLET ORAL at 09:40

## 2019-04-13 NOTE — PLAN OF CARE
and redness to buttocks. Patient turned in bed every two hours and heels elevated off of bed with pillow. EPC cream applied PRN. Will continue to monitor. Problem: Daily Care:  Goal: Daily care needs are met  Description  Daily care needs are met  Outcome: Ongoing  Note:   Patient requires assistance from staff with ADLs. Patient is able to use call light to request assistance when needed. Will continue to address patient's needs as they arise. Problem: Discharge Planning:  Goal: Patients continuum of care needs are met  Description  Patients continuum of care needs are met  Outcome: Ongoing  Note:   Patient planning home with  at discharge.  assisting patient and family with discharge needs. Problem: Nutrition  Goal: Optimal nutrition therapy  Outcome: Ongoing  Note:   Patient on general diet and tolerating well. No nausea/vomiting noted to patient this shift. Problem:   Goal: Adequate urinary output  Outcome: Completed  Note:   Patient voiding adequate amounts of urine without difficulty this shift. Care plan reviewed with patient. Patient verbalizes understanding of plan of care and contributes to goal setting.

## 2019-04-13 NOTE — PROGRESS NOTES
Hospitalist Progress Note      Patient:  Arturo Lee      Unit/Bed:7K-20/020-A    YOB: 1945    MRN: [id number]       Acct: [de-identified]     PCP: Peña Hanson MD    Date of Admission: 4/4/2019    Assessment/Plan:    1. Discitis/osteomyelitis of the T9-10 thoracic spine s/p biopsy and aspiration on 4/8/2019 with cx growing Proteus mirabilis -- apprec ID assist and feels likely related to recent Proteus bacteremia (at outside hospital) and frequent UTIs   -- rocephin 4/5 (s/p zosyn 4/4 - 4/5, vanc 4/4 x 1)   -- blood cx 4/4 (-) --  Afebrile w/o leukocytosis  -- c/s ortho spine -> rec cont IV atbx x 6 weeks and f/u after atbx and if no improvement may need possible intervention  2. N/v -- improved and then worsened again 4/12 am -- seems to correlate with PAIN - cont monitor - checked LFT 4/12 WNL - WBC WNL, minor electrolyte abn -- likely related to infectious processes  -- WBC normal since admission 4/4. Afebrile. LFT 4/4 WNL. -- CT abd/pelvis at [facility] 4/5 (-) acute bowel issue but with #1, small right pleural effusion. -- stopped IVF 4/10 -- on IVF since 4/4  3. urinary retention -- apprec urology assist -- pt and family refused ayers -- Bladder scan 4/5 showed 364 mL of fluid with signs of urinary retention --> Repeat bladder scan on 4/7 again shows urinary retention - pt and family cont to decline ayers and aware of risk of recurrent infections thus no further bladder scans  -- Has recurrent UTIs, UA @ [facility] +ve for nitrites, UA here unremarkable for signs of infection  4.  Small right pleural effusion/?acute Pulmonary edema -- Most recent CXR on 4/8 shows evidence for small right pleural effusion and prominent mahin suggesting pulmonary edema and less likely pneumonia --> s/p Lasix 10 mg IV x 1 on 4/9 and cont on home HCTZ 12.5 mg daily  -- CXR 4/10 with picc placed, right effusion ?loculated with bilateral airspace opacity -?edema, atelectasis or pneumonia, mild CM, hilar prominence - ?vascular crowding or underlying lymphadenopathy -> given additional lasix 20 mg IV x 1 4/10 -- repeat CXR 4/12 with persistent effusion   -- asx and on RA -- just monitor - ??chronic effusion -- no prior to compare   -- ?need further diuretic - on small dose HCTZ for BP - monitor  -- ??tap, ??CT chest but pt ASX   -- checked echo 4/12/19 = EF 55-60%, no WMA, mod dilated LA, diastolic dysfxn, no valve abn  5. RLL atelectasis -- IS as able - ST following -- already on rocephin -- stable on RA  6. Hypoglycemia -- resolved - D5/1/2 NS started 4/7 and BS stable --> stopped IVF 4/10 -- continue to monitor -- last dose of lantus 4/4 and stopped/held since -- cont SSI - ?due to n/v -- monitor and adjust insulin as needed   7. Hyponatremia -- s/p NS 4/4 - 4/6 then changed to D50.9 on 4/7 --> improving 4/10 to 134 -- stopped IVF 4/10 and monitor -- s/p lasix 10 mg IV x 1 on 4/9 and lasix 20 mg IV x 1 on 4/11. Checked TSH 4/11WNL -- ??need to stop HCTZ  8. Hypokalemia -- Replace and monitor -- added po supplement 20 mEq daily 4/12 as also on HCTZ  9. Hypomagnesemia -- Replace and monitor prn -- on daily supplement bid  10. Hx recurrent UTIs -- likely related to retention -- refuses ayers - urology consulted 4/8 and rec cranberry extract and D mannose  11. Essential hypertension -- stopped IVF 4/10 (on some sort of IVF since 4/4) -- cont norvasc 5 mg daily, hCTZ 12.5 mg daily, lisinopril 20 mg bid, lopressor 50 mg bid -- s/p lasix 10 mg IV 4/9 and repeat lasix 20 mg IV x 1 on 4/10 and BP improving -- cont monitor and adjust prn  12.  Type 2 DM -- BS low -- holding home metformin, lantus with n/v and poor po -- cont SSI and monitor -- last A1C per [facility] records 2/9/19 = 9.5  -- BS starting to trend up 4/12 -> monitor today as only occasional elevation - cont SSI - ?add small dose lantus 4/14.  13. Normocytic anemia -- down to 9.6 on 4/11 and stable in 9's since - no signs of loss - monitor -- stable 10-11's -- ?baseline - no signs of bleeding --monitor as was on plavix PTA -- monitor h/h  14. Constipation -- improved - monitor stools - cont stool softeners prn  15. Nephrolithiasis -- noted per CT abd  4/4 at [facility]- no hydronephrosis -- f/u urology - ?also contrib to recurrent infections  16. Hx CVA with residual right hemiparesis and dysphagia -- ST, PT/OT following -- per  pt transfers at home via criss lift and gets around via [facility] -- seems to be at baseline but ??little weaker than normal.  -- Plavix has been held since admission for need for bx for above --> resumed 4/12 as per surgery no plan for surgery at this time. -- cont statin  17. HLD -- statin  18. Diastolic dysfxn - per echo 4/13/19  - monitor fluids  19. Diverticulosis -- no diverticulitis per CT at [facility] 4/4  20. Moderate malnutrition -- per dietician assessment -- NOT severe  21. Morbid Obesity Body mass index is 41.77 kg/m². 22. Generalized weakness -- cont therapies - weaker than baseline per  --- insurance denied ECF. Home - ?therapies at home. Dispo  -- 4/13 --> apprec consultants - plan now for Home IV atbx as insurance denied ECF -- per CM yesterday home infusion company notified but no word on [facility]. ?need new order. Awaiting Dr. Jennifer Carroll for final labs,etc for d/c planning. Pt [place] Barry of d/c today as not sure he knows what he's doing and who really is to show him atbx at home, etc.  Will hold today and likely give atbx tomorrow and d/c that way Monday [facility] can hopefully be there to show him the atbx admin. -- 4/12 --> apprec consultants -- increase nausea this am - ?etiology - ?pain related; Check LFT. Having BM's.  ?check KUB if persists. Cont replace and monitor lytes. Monitor BP, BS, monitor GI sx. Add po KCl daily supplement. Resume home plavix. Check echo. Awaiting precert for ECF    -- 9/77 --> apprec consultants -- per  surgeon PA in and told him no plan for surgery at this time. Awaiting precert for ECF and ID recs for atbx. Cont monitor lytes, renal fxn - repeat CXR tomorrow to eval pleural effusion as given lasix yesterday -- cont monitor BP, BS. Cont monitor po intake and cont therapies. ?resume plavix if no plan for surtery at this time. -- 4/10 --> apprec ID assist -- ortho spine c/s (p) as ?need any further surgical debridement of discitis? ? Await ID recs for atbx tx course. Cont monitor resp status with pleural effusion. Cont replace lytes and monitor po intake. Stop IVF. Monitor BP and cont current meds - if cont trend up may need to increase norvasc. Cont encourage IS. Monitor lytes, renal fxn, BP, BS, resp status. SS following as requesting SNF for IV atbx and therapies. NEED to resume plavix if no further procedures planned per surgery. Chief Complaint: n/v    Hospital Course: Aramis Recinos is a 72 y.o. female with hx of CVA with right hemiparesis, dysphagia and WC bound and mobilizes via criss lift, DM2, CKD, GERD, HTN transferred from [facility] for possible discitis at T8-9 with possible OM. ~6 weeks prior admitted there for Proteus bacteremia. Presented to [facility] for 3 weeks of n/v.  ID consulted on admission - Dr. Frederic Ribera eval done - s/p IR bx of T9-10 disc space aspiration and bx which is growing proteus mirabilis. On IV rocephin since 4/5 and per ID plan 6 weeks IV atbx. Repeat blood cx on admission (-).  N/v up and down. -- ortho spine consulted and rec complete 6 wks of IV atbx and re-eval after. Subjective:   -- 4/13/2019  --> pt doing well this am - minimal pain and no nausea. Only had nausea in am yesterday then was ok. Eating breakfast - doing little better eating. No abd pain this am.  Denies cp, sob. Denies f/c.  Bandar po. On RA. Afebrile. +BM 4/12 x 2. BP well controlled. Bs starting to rise as eating better.       Medications:  Reviewed    Infusion Medications    dextrose       Scheduled Medications    potassium chloride  40 mEq Oral Once    potassium bicarb-citric acid  20 mEq Oral Daily    clopidogrel  75 mg Oral Daily    magnesium replacement protocol   Other RX Placeholder    potassium (CARDIAC) replacement protocol   Other RX Placeholder    Magnesium Oxide  400 mg Oral BID    docusate sodium  100 mg Oral BID    ceFAZolin  1 g Intravenous 60 Min Pre-Op    iohexol  20 mL Other Once    pantoprazole  40 mg Oral QAM AC    miconazole   Topical BID    cefTRIAXone (ROCEPHIN) IV  2 g Intravenous Q24H    lactobacillus  1 capsule Oral Daily with breakfast    amLODIPine  5 mg Oral Daily    atorvastatin  20 mg Oral Nightly    calcium elemental  500 mg Oral Daily    citalopram  10 mg Oral Daily    hydrochlorothiazide  12.5 mg Oral Daily    lisinopril  20 mg Oral BID    metoprolol tartrate  50 mg Oral BID    insulin lispro  0-6 Units Subcutaneous TID WC    insulin lispro  0-3 Units Subcutaneous Nightly     PRN Meds: docusate sodium, ondansetron, promethazine, acetaminophen, glucose, dextrose, glucagon (rDNA), dextrose      Intake/Output Summary (Last 24 hours) at 4/13/2019 0820  Last data filed at 4/13/2019 0313  Gross per 24 hour   Intake 536.4 ml   Output --   Net 536.4 ml       Diet:  Dietary Nutrition Supplements: Frozen Oral Supplement  DIET CARB CONTROL; No Drinking Straw    Exam:  BP (!) 144/64   Pulse 65   Temp 98.4 °F (36.9 °C) (Oral)   Resp 18   Ht 5' (1.524 m)   Wt 213 lb 14.4 oz (97 kg) Comment: pts  states he thinks she weighs less, will zero bed next time pt is out of it  LMP  (LMP Unknown) Comment: post menopase  SpO2 92%   Breastfeeding? No   BMI 41.77 kg/m²     General appearance: No apparent distress, appears much older than stated age and cooperative. HEENT: Pupils equal, round, and reactive to light. Conjunctivae/corneas clear. Neck: Supple, with full range of motion. No jugular venous distention. Trachea midline. Respiratory:  Normal respiratory effort.  Diminished in bases but Clear to auscultation, bilaterally without Rales/Wheezes/Rhonchi. No respiratory distress or accessory muscle use. Cardiovascular: Regular rate and rhythm with normal S1/S2, 2/6 YENY, No rubs or gallops. Abdomen: Soft, non-tender, non-distended with normal bowel sounds. No rebound or guarding  Musculoskeletal: trace BLE edema, no TTP, RLE weaker than LLE  Skin: Skin color, texture, turgor normal.  No rashes or lesions. Neurologic:  Slow speech, right hemiparesis - no other focal weakness  Psychiatric: Alert and oriented, thought content appropriate  Capillary Refill: Brisk,< 3 seconds   Peripheral Pulses: +1 palpable, equal bilaterally       Labs:   Recent Labs     04/11/19 0617 04/12/19  0651 04/13/19  0656   WBC  --  6.4  --    HGB 9.6* 9.5* 9.8*   HCT 30.5* 29.7* 31.3*   PLT  --  256  --      Recent Labs     04/11/19 0617 04/12/19  0651 04/13/19  0656    134* 134*   K 3.5 3.4* 4.0   CL 97* 95* 94*   CO2 28 27 29   BUN <2* 4* 6*   CREATININE 0.3* 0.3* 0.3*   CALCIUM 8.1* 8.0* 8.4*     Recent Labs     04/12/19  0651   AST <5   ALT <5*   BILIDIR <0.2   BILITOT 0.4   ALKPHOS 90     No results for input(s): INR in the last 72 hours. No results for input(s): Nanetta Suni in the last 72 hours. Urinalysis:      Lab Results   Component Value Date    NITRU NEGATIVE 04/05/2019    BLOODU NEGATIVE 04/05/2019    GLUCOSEU NEGATIVE 04/05/2019       Radiology:  XR CHEST PORTABLE   Final Result   1. Persistent right lower lobe atelectasis and or infiltrate changes with associated effusion. Overall appearance is unchanged compared to prior study         **This report has been created using voice recognition software. It may contain minor errors which are inherent in voice recognition technology. **      Final report electronically signed by Dr. Justin Quiroz on 4/12/2019 5:17 AM      RADIOLOGY REPORT   Final Result      XR CHEST PORTABLE   Final Result         1.  Interval placement of left PICC line with tip in the SVC. 2. Right pleural effusion possibly loculated. Bilateral airspace opacity correlate for edema, atelectasis or pneumonia. 3. Mild cardiomegaly. Prominent of the hilar structures bilaterally which may be secondary to vascular crowding or underlying lymphadenopathy            **This report has been created using voice recognition software. It may contain minor errors which are inherent in voice recognition technology. **      Final report electronically signed by Dr. Skyler Hicks on 4/10/2019 2:17 PM      XR CHEST PORTABLE   Final Result   1. Small right-sided pleural effusion. 2. Bibasilar consolidative airspace opacities are present and may represent atelectasis or pneumonia. 3. Prominent appearance of the bilateral mahin suggesting underlying pulmonary vascular congestion versus lymphadenopathy. 4. Mild enlargement of the cardiac mediastinal silhouette. 5. No pneumothorax is seen. **This report has been created using voice recognition software. It may contain minor errors which are inherent in voice recognition technology. **      Final report electronically signed by Dr. Summer Resendiz on 4/8/2019 7:21 PM      IR FLUORO GUIDED NEEDLE PLACEMENT   Final Result   1. Successful fluoroscopic-guided T9-T10 intervertebral disc space aspiration and biopsy as described above. **This report has been created using voice recognition software. It may contain minor errors which are inherent in voice recognition technology. **      Final report electronically signed by Dr. Summer Resendiz on 4/8/2019 7:39 PM      MRI COMPARISON OF OUTSIDE FILMS   Final Result      CT COMPARISON OF OUTSIDE FILMS   Final Result          Diet: Dietary Nutrition Supplements: Frozen Oral Supplement  DIET CARB CONTROL; No Drinking Straw    Lloyd: no    Microbiology:  Blood cx 4/4/19 (-)    T10 disc space aspiration/bx 4/8/19 = Proteus mirabilis    Tele: SR with PACs    DVT prophylaxis: [] Lovenox

## 2019-04-14 LAB
ANION GAP SERPL CALCULATED.3IONS-SCNC: 9 MEQ/L (ref 8–16)
BUN BLDV-MCNC: 5 MG/DL (ref 7–22)
CALCIUM SERPL-MCNC: 8.5 MG/DL (ref 8.5–10.5)
CHLORIDE BLD-SCNC: 94 MEQ/L (ref 98–111)
CO2: 28 MEQ/L (ref 23–33)
CREAT SERPL-MCNC: 0.4 MG/DL (ref 0.4–1.2)
GFR SERPL CREATININE-BSD FRML MDRD: > 90 ML/MIN/1.73M2
GLUCOSE BLD-MCNC: 129 MG/DL (ref 70–108)
GLUCOSE BLD-MCNC: 138 MG/DL (ref 70–108)
GLUCOSE BLD-MCNC: 161 MG/DL (ref 70–108)
GLUCOSE BLD-MCNC: 179 MG/DL (ref 70–108)
GLUCOSE BLD-MCNC: 255 MG/DL (ref 70–108)
MAGNESIUM: 1.5 MG/DL (ref 1.6–2.4)
POTASSIUM SERPL-SCNC: 4.4 MEQ/L (ref 3.5–5.2)
SODIUM BLD-SCNC: 131 MEQ/L (ref 135–145)

## 2019-04-14 PROCEDURE — 2500000003 HC RX 250 WO HCPCS: Performed by: INTERNAL MEDICINE

## 2019-04-14 PROCEDURE — 6370000000 HC RX 637 (ALT 250 FOR IP): Performed by: PHYSICIAN ASSISTANT

## 2019-04-14 PROCEDURE — 80048 BASIC METABOLIC PNL TOTAL CA: CPT

## 2019-04-14 PROCEDURE — 36415 COLL VENOUS BLD VENIPUNCTURE: CPT

## 2019-04-14 PROCEDURE — 83735 ASSAY OF MAGNESIUM: CPT

## 2019-04-14 PROCEDURE — 6370000000 HC RX 637 (ALT 250 FOR IP): Performed by: INTERNAL MEDICINE

## 2019-04-14 PROCEDURE — 99233 SBSQ HOSP IP/OBS HIGH 50: CPT | Performed by: FAMILY MEDICINE

## 2019-04-14 PROCEDURE — 2580000003 HC RX 258: Performed by: INTERNAL MEDICINE

## 2019-04-14 PROCEDURE — 82948 REAGENT STRIP/BLOOD GLUCOSE: CPT

## 2019-04-14 PROCEDURE — 6370000000 HC RX 637 (ALT 250 FOR IP): Performed by: FAMILY MEDICINE

## 2019-04-14 PROCEDURE — 1200000003 HC TELEMETRY R&B

## 2019-04-14 PROCEDURE — 6360000002 HC RX W HCPCS: Performed by: INTERNAL MEDICINE

## 2019-04-14 RX ORDER — CEFTRIAXONE 500 MG/1
2 INJECTION, POWDER, FOR SOLUTION INTRAMUSCULAR; INTRAVENOUS EVERY 24 HOURS
Qty: 64000 MG | Refills: 0 | Status: ON HOLD | OUTPATIENT
Start: 2019-04-14 | End: 2019-05-16 | Stop reason: HOSPADM

## 2019-04-14 RX ADMIN — CEFTRIAXONE SODIUM 2 G: 2 INJECTION, POWDER, FOR SOLUTION INTRAMUSCULAR; INTRAVENOUS at 12:59

## 2019-04-14 RX ADMIN — LISINOPRIL 20 MG: 20 TABLET ORAL at 22:16

## 2019-04-14 RX ADMIN — PANTOPRAZOLE SODIUM 40 MG: 40 TABLET, DELAYED RELEASE ORAL at 09:38

## 2019-04-14 RX ADMIN — Medication 400 MG: at 14:05

## 2019-04-14 RX ADMIN — CITALOPRAM 10 MG: 20 TABLET, FILM COATED ORAL at 14:05

## 2019-04-14 RX ADMIN — Medication 1 CAPSULE: at 14:05

## 2019-04-14 RX ADMIN — METOPROLOL TARTRATE 50 MG: 50 TABLET, FILM COATED ORAL at 22:17

## 2019-04-14 RX ADMIN — INSULIN LISPRO 3 UNITS: 100 INJECTION, SOLUTION INTRAVENOUS; SUBCUTANEOUS at 16:52

## 2019-04-14 RX ADMIN — Medication 400 MG: at 22:17

## 2019-04-14 RX ADMIN — INSULIN LISPRO 1 UNITS: 100 INJECTION, SOLUTION INTRAVENOUS; SUBCUTANEOUS at 12:56

## 2019-04-14 RX ADMIN — POTASSIUM BICARBONATE 20 MEQ: 782 TABLET, EFFERVESCENT ORAL at 14:06

## 2019-04-14 RX ADMIN — MICONAZOLE NITRATE: 2 POWDER TOPICAL at 14:03

## 2019-04-14 RX ADMIN — CALCIUM 500 MG: 500 TABLET ORAL at 14:05

## 2019-04-14 RX ADMIN — ATORVASTATIN CALCIUM 20 MG: 20 TABLET, FILM COATED ORAL at 22:16

## 2019-04-14 RX ADMIN — CLOPIDOGREL BISULFATE 75 MG: 75 TABLET ORAL at 14:05

## 2019-04-14 RX ADMIN — INSULIN LISPRO 1 UNITS: 100 INJECTION, SOLUTION INTRAVENOUS; SUBCUTANEOUS at 22:17

## 2019-04-14 ASSESSMENT — PAIN SCALES - GENERAL
PAINLEVEL_OUTOF10: 2
PAINLEVEL_OUTOF10: 2
PAINLEVEL_OUTOF10: 0

## 2019-04-14 NOTE — PROGRESS NOTES
Hospitalist Progress Note      Patient:  Chas Marin      Unit/Bed:7K-20/020-A    YOB: 1945    MRN: [id number]       Acct: [de-identified]     PCP: Isaiah Stone MD    Date of Admission: 4/4/2019    Assessment/Plan:    1. Discitis/osteomyelitis of the T9-10 thoracic spine s/p biopsy and aspiration on 4/8/2019 with cx growing Proteus mirabilis -- apprec ID assist and feels likely related to recent Proteus bacteremia (at outside hospital) and frequent UTIs   -- rocephin 4/5 (s/p zosyn 4/4 - 4/5, vanc 4/4 x 1)   -- blood cx 4/4 (-) --  Afebrile w/o leukocytosis  -- c/s ortho spine -> rec cont IV atbx x 6 weeks and f/u after atbx and if no improvement may need possible intervention  2. N/v -- improved and then worsened again 4/12 am better 4/13 and worse again 4/14 Am --> improves during day -- seems to correlate with PAIN - cont monitor   - checked LFT 4/12 WNL   - WBC WNL, minor electrolyte abn but ?low Na 131 on 4/14 contrib to nausea  -- likely related to infectious processes on admission  -- WBC normal since admission 4/4. Afebrile. -- CT abd/pelvis at [facility] 4/5 (-) acute bowel issue but with #1, small right pleural effusion. -- stopped IVF 4/10 -- on IVF since 4/4  3. urinary retention -- apprec urology assist -- pt and family refused ayers -- Bladder scan 4/5 showed 364 mL of fluid with signs of urinary retention --> Repeat bladder scan on 4/7 again shows urinary retention - pt and family cont to decline ayers and aware of risk of recurrent infections thus no further bladder scans  -- Has recurrent UTIs, UA @ [facility] +ve for nitrites, UA here unremarkable for signs of infection  4.  Small right pleural effusion/?acute Pulmonary edema -- Most recent CXR on 4/8 shows evidence for small right pleural effusion and prominent mahin suggesting pulmonary edema and less likely pneumonia --> s/p Lasix 10 mg IV x 1 on 4/9 and cont on home HCTZ 12.5 mg daily  -- CXR 4/10 with picc placed, right effusion ?loculated with bilateral airspace opacity -?edema, atelectasis or pneumonia, mild CM, hilar prominence - ?vascular crowding or underlying lymphadenopathy -> given additional lasix 20 mg IV x 1 4/10 -- repeat CXR 4/12 with persistent effusion   -- asx and on RA -- just monitor - ??chronic effusion -- no prior to compare   -- ?need further diuretic - on small dose HCTZ for BP - monitor  -- ??tap, ??CT chest but pt ASX   -- checked echo 4/12/19 = EF 55-60%, no WMA, mod dilated LA, diastolic dysfxn, no valve abn  5. RLL atelectasis -- IS as able - ST following -- already on rocephin -- stable on RA  6. Hypoglycemia -- resolved - D5/1/2 NS started 4/7 and BS stable --> stopped IVF 4/10 -- continue to monitor -- last dose of lantus 4/4 and stopped/held since -- cont SSI - ?due to n/v -- monitor and adjust insulin as needed   7. Hyponatremia -- down to 131 on 4/14 --> stop HCTZ 4/14 -- ?contrib to pt's nausea but nausea improves during the day  -- s/p NS 4/4 - 4/6 then changed to D50.9 on 4/7 --> Na improving 4/10 to 134 -- stopped IVF 4/10   -- s/p lasix 10 mg IV x 1 on 4/9 and lasix 20 mg IV x 1 on 4/11 for edema and pleural effusion  -- Checked TSH 4/11WNL  8. Hypokalemia -- Replace and monitor -- added po supplement 20 mEq daily 4/12 as also on HCTZ -> stopped HCTZ 4/14 and stopped potassium supplement 4/14 - monitor  9. Hypomagnesemia -- Replace and monitor prn -- on daily supplement bid  10. Hx recurrent UTIs -- likely related to retention -- refuses ayers - urology consulted 4/8 and rec cranberry extract and D mannose  11. Essential hypertension -- stopped IVF 4/10 (on some sort of IVF since 4/4) -- cont norvasc 5 mg daily, hCTZ 12.5 mg daily, lisinopril 20 mg bid, lopressor 50 mg bid -- s/p lasix 10 mg IV 4/9 and repeat lasix 20 mg IV x 1 on 4/10 and BP improving -- cont monitor and adjust prn  12.  Type 2 DM -- BS low -- holding home metformin, lantus with n/v and poor po -- cont SSI and monitor -- last A1C per [facility] records 2/9/19 = 9.5  -- BS starting to trend up 4/12 -> monitor as only occasional elevation - cont SSI - ?add small dose lantus 4/15. 13. Normocytic anemia -- down to 9.6 on 4/11 and stable in 9's since - no signs of loss - monitor -- stable 10-11's -- ?baseline - no signs of bleeding --monitor as was on plavix PTA -- monitor h/h  14. Constipation -- improved - monitor stools - cont stool softeners prn  15. Nephrolithiasis -- noted per CT abd  4/4 at [facility]- no hydronephrosis -- f/u urology - ?also contrib to recurrent infections  16. 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  -- Plavix has been held since admission for need for bx for above --> resumed 4/12 as per surgery no plan for surgery at this time. -- cont statin  17. HLD -- statin  18. Diastolic dysfxn - per echo 4/13/19  - monitor fluids  19. Diverticulosis -- no diverticulitis per CT at [facility] 4/4  20. Moderate malnutrition -- per dietician assessment -- NOT severe  21. Morbid Obesity Body mass index is 41.77 kg/m². 22. Generalized weakness -- cont therapies - weaker than baseline per  --- insurance denied ECF. Home - ?therapies at home. Dispo  -- 4/14 --> apprec consulltants - d/w Dr. Lux Rings and to finalize lab orders, atbx etc for d/c - d/w  and is very unsure about going home and has questions for SS and CM -- [facility] - CM note Friday does NOT mention if additional services were added and ?need new HH order. Pt with nausea this am again - Na down - stop HCTZ - encourage hydration - ?need IVF if cont drop. Monitor BP - BS up and down - cont just SSI for now. Discussed plan to d/c tomorrow with pt and  once SS and CM return.     -- 4/13 --> apprec consultants - plan now for Home IV atbx as insurance denied ECF -- per CM yesterday home infusion company notified but no word on [facility]. ?need new order. Awaiting Dr. Meredith Camarillo for final labs,etc for d/c planning. Pt Christiana Limb of d/c today as not sure he knows what he's doing and who really is to show him atbx at home, etc.  Will hold today and likely give atbx tomorrow and d/c that way Monday [facility] can hopefully be there to show him the atbx admin. -- 4/12 --> apprec consultants -- increase nausea this am - ?etiology - ?pain related; Check LFT. Having BM's.  ?check KUB if persists. Cont replace and monitor lytes. Monitor BP, BS, monitor GI sx. Add po KCl daily supplement. Resume home plavix. Check echo. Awaiting precert for ECF    -- 6/24 --> apprec consultants -- per  surgeon PA in and told him no plan for surgery at this time. Awaiting precert for ECF and ID recs for atbx. Cont monitor lytes, renal fxn - repeat CXR tomorrow to eval pleural effusion as given lasix yesterday -- cont monitor BP, BS. Cont monitor po intake and cont therapies. ?resume plavix if no plan for surtery at this time. -- 4/10 --> apprec ID assist -- ortho spine c/s (p) as ?need any further surgical debridement of discitis? ? Await ID recs for atbx tx course. Cont monitor resp status with pleural effusion. Cont replace lytes and monitor po intake. Stop IVF. Monitor BP and cont current meds - if cont trend up may need to increase norvasc. Cont encourage IS. Monitor lytes, renal fxn, BP, BS, resp status. SS following as requesting SNF for IV atbx and therapies. NEED to resume plavix if no further procedures planned per surgery. Chief Complaint: n/v    Hospital Course: Lanette Monroy is a 72 y.o. female with hx of CVA with right hemiparesis, dysphagia and WC bound and mobilizes via criss lift, DM2, CKD, GERD, HTN transferred from [facility] for possible discitis at T8-9 with possible OM.   ~6 weeks prior admitted there for Proteus bacteremia. Presented to [facility] for 3 weeks of n/v.  ID consulted on admission - Dr. Bills Day eval done - s/p IR bx of T9-10 disc space aspiration and bx which is growing proteus mirabilis. On IV rocephin since 4/5 and per ID plan 6 weeks IV atbx. Repeat blood cx on admission (-).  N/v up and down. -- ortho spine consulted and rec complete 6 wks of IV atbx and re-eval after. Subjective:   -- 4/14/2019  --> pt again with mid back pain and nausea this am.  Just got zofran and is helping. No abd pain. Denies cp, sob. Denies f/c.  Bandar po. On RA. Afebrile. +BM yesterday - states one in am and then 2 small ones during day.       Medications:  Reviewed    Infusion Medications    dextrose       Scheduled Medications    potassium bicarb-citric acid  20 mEq Oral Daily    clopidogrel  75 mg Oral Daily    magnesium replacement protocol   Other RX Placeholder    potassium (CARDIAC) replacement protocol   Other RX Placeholder    Magnesium Oxide  400 mg Oral BID    docusate sodium  100 mg Oral BID    ceFAZolin  1 g Intravenous 60 Min Pre-Op    iohexol  20 mL Other Once    pantoprazole  40 mg Oral QAM AC    miconazole   Topical BID    cefTRIAXone (ROCEPHIN) IV  2 g Intravenous Q24H    lactobacillus  1 capsule Oral Daily with breakfast    amLODIPine  5 mg Oral Daily    atorvastatin  20 mg Oral Nightly    calcium elemental  500 mg Oral Daily    citalopram  10 mg Oral Daily    hydrochlorothiazide  12.5 mg Oral Daily    lisinopril  20 mg Oral BID    metoprolol tartrate  50 mg Oral BID    insulin lispro  0-6 Units Subcutaneous TID WC    insulin lispro  0-3 Units Subcutaneous Nightly     PRN Meds: docusate sodium, ondansetron, promethazine, acetaminophen, glucose, dextrose, glucagon (rDNA), dextrose      Intake/Output Summary (Last 24 hours) at 4/14/2019 0916  Last data filed at 4/13/2019 1405  Gross per 24 hour   Intake 240 ml   Output --   Net 240 ml       Diet:  Dietary Nutrition Supplements: Frozen Oral Supplement  DIET CARB CONTROL; No Drinking Straw    Exam:  /61   Pulse 65   Temp 98.3 °F (36.8 °C) (Oral)   Resp 16   Ht 5' (1.524 m)   Wt 213 lb 14.4 oz (97 kg) Comment: pts  states he thinks she weighs less, will zero bed next time pt is out of it  LMP  (LMP Unknown) Comment: post menopase  SpO2 95%   Breastfeeding? No   BMI 41.77 kg/m²     General appearance: No apparent distress, appears much older than stated age and cooperative. HEENT: Pupils equal, round, and reactive to light. Conjunctivae/corneas clear. Neck: Supple, with full range of motion. No jugular venous distention. Trachea midline. Respiratory:  Normal respiratory effort. Diminished in bases but Clear to auscultation, bilaterally without Rales/Wheezes/Rhonchi. No respiratory distress or accessory muscle use. Cardiovascular: Regular rate and rhythm with normal S1/S2, 2/6 YENY, No rubs or gallops. Abdomen: Soft, non-tender, non-distended with normal bowel sounds. No rebound or guarding  Musculoskeletal: trace BLE edema, no TTP, RLE weaker than LLE  Skin: Skin color, texture, turgor normal.  No rashes or lesions. Neurologic:  Slow speech, right hemiparesis - no other focal weakness  Psychiatric: Alert and oriented, thought content appropriate  Capillary Refill: Brisk,< 3 seconds   Peripheral Pulses: +1 palpable, equal bilaterally       Labs:   Recent Labs     04/12/19  0651 04/13/19  0656   WBC 6.4  --    HGB 9.5* 9.8*   HCT 29.7* 31.3*     --      Recent Labs     04/12/19  0651 04/13/19  0656 04/14/19  0823   * 134* 131*   K 3.4* 4.0 4.4   CL 95* 94* 94*   CO2 27 29 28   BUN 4* 6* 5*   CREATININE 0.3* 0.3* 0.4   CALCIUM 8.0* 8.4* 8.5     Recent Labs     04/12/19  0651   AST <5   ALT <5*   BILIDIR <0.2   BILITOT 0.4   ALKPHOS 90     No results for input(s): INR in the last 72 hours. No results for input(s): Ashkan Risser in the last 72 hours.     Urinalysis:      Lab Results   Component Value Date    NITRU NEGATIVE 04/05/2019    BLOODU NEGATIVE 04/05/2019    GLUCOSEU NEGATIVE 04/05/2019       Radiology:  XR CHEST PORTABLE   Final Result   1. Persistent right lower lobe atelectasis and or infiltrate changes with associated effusion. Overall appearance is unchanged compared to prior study         **This report has been created using voice recognition software. It may contain minor errors which are inherent in voice recognition technology. **      Final report electronically signed by Dr. Amina Mendiola on 4/12/2019 5:17 AM      RADIOLOGY REPORT   Final Result      XR CHEST PORTABLE   Final Result         1. Interval placement of left PICC line with tip in the SVC. 2. Right pleural effusion possibly loculated. Bilateral airspace opacity correlate for edema, atelectasis or pneumonia. 3. Mild cardiomegaly. Prominent of the hilar structures bilaterally which may be secondary to vascular crowding or underlying lymphadenopathy            **This report has been created using voice recognition software. It may contain minor errors which are inherent in voice recognition technology. **      Final report electronically signed by Dr. Hilary Puente on 4/10/2019 2:17 PM      XR CHEST PORTABLE   Final Result   1. Small right-sided pleural effusion. 2. Bibasilar consolidative airspace opacities are present and may represent atelectasis or pneumonia. 3. Prominent appearance of the bilateral mahin suggesting underlying pulmonary vascular congestion versus lymphadenopathy. 4. Mild enlargement of the cardiac mediastinal silhouette. 5. No pneumothorax is seen. **This report has been created using voice recognition software. It may contain minor errors which are inherent in voice recognition technology. **      Final report electronically signed by Dr. Giovanna Campos on 4/8/2019 7:21 PM      IR FLUORO GUIDED NEEDLE PLACEMENT   Final Result   1.  Successful fluoroscopic-guided T9-T10 intervertebral disc space aspiration and biopsy as described above. **This report has been created using voice recognition software. It may contain minor errors which are inherent in voice recognition technology. **      Final report electronically signed by Dr. Dimas Zabala on 4/8/2019 7:39 PM      MRI COMPARISON OF OUTSIDE FILMS   Final Result      CT COMPARISON OF OUTSIDE FILMS   Final Result          Diet: Dietary Nutrition Supplements: Frozen Oral Supplement  DIET CARB CONTROL; No Drinking Straw    Lloyd: no    Microbiology:  Blood cx 4/4/19 (-)    T10 disc space aspiration/bx 4/8/19 = Proteus mirabilis    Tele: SR with PACs    DVT prophylaxis: [] Lovenox                                 [x] SCDs --> added 4/10                                 [] SQ Heparin                                 [] Encourage ambulation           [] Already on Anticoagulation     Disposition:    [] Home       [] TCU       [] Rehab       [] Psych       [x] SNF       [] [place]       [] Other-    Code Status: Full Code    PT/OT Eval Status: following        Electronically signed by Valri Gosselin, MD on 4/14/2019 at 9:16 AM when pt evaluated - final note filed late

## 2019-04-14 NOTE — PLAN OF CARE
Problem: Risk for Impaired Skin Integrity  Goal: Tissue integrity - skin and mucous membranes  Description  Structural intactness and normal physiological function of skin and  mucous membranes. Outcome: Met This Shift     Problem: Falls - Risk of:  Goal: Will remain free from falls  Description  Will remain free from falls  Outcome: Met This Shift     Problem: Daily Care:  Goal: Daily care needs are met  Description  Daily care needs are met  Outcome: Met This Shift     Problem: Nutrition  Goal: Optimal nutrition therapy  Outcome: Met This Shift     Problem: GI  Goal: No bowel complications  Outcome: Met This Shift     Problem: Infection - Central Venous Catheter-Associated Bloodstream Infection:  Goal: Will show no infection signs and symptoms  Description  Will show no infection signs and symptoms  Outcome: Met This Shift     Problem: Pain Control  Goal: Maintain pain level at or below patient's acceptable level (or 5 if patient is unable to determine acceptable level)  Outcome: Met This Shift     Care plan reviewed with patient and sposue. Patient and spouse verbalize understanding of the plan of care and contribute to goal setting.

## 2019-04-14 NOTE — PROGRESS NOTES
Progress note: Infectious diseases    Patient - Agatha Witt,  Age - 72 y.o.    - 1945      Room Number - 7K-20/020-A   MRN -  [id number]   Acct # - [de-identified]  Date of Admission -  2019  5:20 PM    SUBJECTIVE:   She has no new issues. Plan for discharge at am  OBJECTIVE   VITALS    height is 5' (1.524 m) and weight is 213 lb 14.4 oz (97 kg). Her oral temperature is 98.6 °F (37 °C). Her blood pressure is 108/55 (abnormal) and her pulse is 66. Her respiration is 18 and oxygen saturation is 93%. Wt Readings from Last 3 Encounters:   19 213 lb 14.4 oz (97 kg)       I/O (24 Hours)    Intake/Output Summary (Last 24 hours) at 2019 1012  Last data filed at 2019 1405  Gross per 24 hour   Intake 120 ml   Output --   Net 120 ml       General Appearance  Awake, alert, oriented,    HEENT - normocephalic, atraumatic, pink conjunctiva,  anicteric sclera  Neck - Supple, no mass  Lungs -  Bilateral good air entry, no rhonchi, no wheeze  Cardiovascular - Heart sounds are normal.     Abdomen - soft, not distended, nontender,   Neurologic -right  weakness  Skin - No bruising or bleeding  Extremities - picc line on the left upper arm.     MEDICATIONS:      potassium bicarb-citric acid  20 mEq Oral Daily    clopidogrel  75 mg Oral Daily    magnesium replacement protocol   Other RX Placeholder    potassium (CARDIAC) replacement protocol   Other RX Placeholder    Magnesium Oxide  400 mg Oral BID    docusate sodium  100 mg Oral BID    ceFAZolin  1 g Intravenous 60 Min Pre-Op    iohexol  20 mL Other Once    pantoprazole  40 mg Oral QAM AC    miconazole   Topical BID    cefTRIAXone (ROCEPHIN) IV  2 g Intravenous Q24H    lactobacillus  1 capsule Oral Daily with breakfast    amLODIPine  5 mg Oral Daily    atorvastatin  20 mg Oral Nightly    calcium elemental  500 mg Oral Daily    citalopram  10 mg Oral Daily    lisinopril  20 mg Oral BID    metoprolol tartrate  50 mg Oral BID    insulin lispro  0-6 Units Subcutaneous TID WC    insulin lispro  0-3 Units Subcutaneous Nightly      dextrose       docusate sodium, ondansetron, promethazine, acetaminophen, glucose, dextrose, glucagon (rDNA), dextrose      LABS:     CBC:   Recent Labs     04/12/19  0651 04/13/19  0656   WBC 6.4  --    HGB 9.5* 9.8*     --      BMP:    Recent Labs     04/12/19  0651 04/13/19  0656 04/14/19  0823   * 134* 131*   K 3.4* 4.0 4.4   CL 95* 94* 94*   CO2 27 29 28   BUN 4* 6* 5*   CREATININE 0.3* 0.3* 0.4   GLUCOSE 122* 122* 129*     Calcium:  Recent Labs     04/14/19  0823   CALCIUM 8.5     Ionized Calcium:No results for input(s): IONCA in the last 72 hours. Magnesium:  Recent Labs     04/14/19  0823   MG 1.5*     Phosphorus:No results for input(s): PHOS in the last 72 hours. BNP:No results for input(s): BNP in the last 72 hours. Glucose:  Recent Labs     04/13/19  1533 04/13/19  1944 04/14/19  0627   POCGLU 194* 183* 138*     HgbA1C: No results for input(s): LABA1C in the last 72 hours. INR:   No results for input(s): INR in the last 72 hours.         Problem list of patient:     Patient Active Problem List   Diagnosis Code    Osteomyelitis ([facility] [address] M86.9    Acute cystitis without hematuria N30.00    Type 2 diabetes mellitus with other specified complication ([facility] [address] J02.99    Essential hypertension I10    Severe protein-calorie malnutrition ([address] E43    Examination for normal comparison for clinical research Z00.6    Discitis M46.40    Oliguria R34    Morbid obesity ([address] E66.01    History of CVA (cerebrovascular accident) Z86.73    Hypoglycemia E16.2    Loose stools R19.5    Hyponatremia E87.1    Hypokalemia E87.6    Moderate malnutrition (HCC) E44.0    Urinary retention R33.9    Recurrent UTI N39.0    Nausea and vomiting R11.2    Pleural effusion on right J90    Hypomagnesemia E83.42    Bilateral atelectasis J98.11    Normocytic anemia D64.9    Generalized weakness R53.1         ASSESSMENT/PLAN   · Discitis/osteomyelitis of the thoracic spine: biopsy growing proteus  · History of recurrent urinary tract infection with recent history of Bacteremia due to Proteus. likely source for the discites  · History of CVA with right-sided weakness  · Limited mobility with deconditioning. · She'll be discharged home to continue IV antibiotics for a total of 6 weeks.   · Follow-up will be scheduled in 2 weeks at the wound clinic  · Prescription on folder          Tiarra Velez MD, [address] 4/14/2019 10:12 AM

## 2019-04-15 VITALS
HEIGHT: 60 IN | BODY MASS INDEX: 41.99 KG/M2 | TEMPERATURE: 98.3 F | HEART RATE: 63 BPM | RESPIRATION RATE: 16 BRPM | OXYGEN SATURATION: 94 % | DIASTOLIC BLOOD PRESSURE: 62 MMHG | SYSTOLIC BLOOD PRESSURE: 129 MMHG | WEIGHT: 213.9 LBS

## 2019-04-15 LAB
ALBUMIN SERPL-MCNC: 1.8 G/DL (ref 3.5–5.1)
ALP BLD-CCNC: 94 U/L (ref 38–126)
ALT SERPL-CCNC: < 5 U/L (ref 11–66)
ANION GAP SERPL CALCULATED.3IONS-SCNC: 9 MEQ/L (ref 8–16)
AST SERPL-CCNC: 8 U/L (ref 5–40)
BILIRUB SERPL-MCNC: 0.3 MG/DL (ref 0.3–1.2)
BUN BLDV-MCNC: 6 MG/DL (ref 7–22)
CALCIUM SERPL-MCNC: 8.4 MG/DL (ref 8.5–10.5)
CHLORIDE BLD-SCNC: 95 MEQ/L (ref 98–111)
CO2: 27 MEQ/L (ref 23–33)
CREAT SERPL-MCNC: 0.4 MG/DL (ref 0.4–1.2)
GFR SERPL CREATININE-BSD FRML MDRD: > 90 ML/MIN/1.73M2
GLUCOSE BLD-MCNC: 122 MG/DL (ref 70–108)
GLUCOSE BLD-MCNC: 122 MG/DL (ref 70–108)
GLUCOSE BLD-MCNC: 183 MG/DL (ref 70–108)
MAGNESIUM: 1.5 MG/DL (ref 1.6–2.4)
POTASSIUM SERPL-SCNC: 5 MEQ/L (ref 3.5–5.2)
SODIUM BLD-SCNC: 131 MEQ/L (ref 135–145)
TOTAL PROTEIN: 6.2 G/DL (ref 6.1–8)

## 2019-04-15 PROCEDURE — 83735 ASSAY OF MAGNESIUM: CPT

## 2019-04-15 PROCEDURE — 82948 REAGENT STRIP/BLOOD GLUCOSE: CPT

## 2019-04-15 PROCEDURE — 6360000002 HC RX W HCPCS: Performed by: FAMILY MEDICINE

## 2019-04-15 PROCEDURE — 36415 COLL VENOUS BLD VENIPUNCTURE: CPT

## 2019-04-15 PROCEDURE — 2580000003 HC RX 258: Performed by: INTERNAL MEDICINE

## 2019-04-15 PROCEDURE — 92526 ORAL FUNCTION THERAPY: CPT

## 2019-04-15 PROCEDURE — 80053 COMPREHEN METABOLIC PANEL: CPT

## 2019-04-15 PROCEDURE — 99239 HOSP IP/OBS DSCHRG MGMT >30: CPT | Performed by: NURSE PRACTITIONER

## 2019-04-15 PROCEDURE — 6360000002 HC RX W HCPCS: Performed by: INTERNAL MEDICINE

## 2019-04-15 RX ORDER — MAGNESIUM SULFATE IN WATER 40 MG/ML
2 INJECTION, SOLUTION INTRAVENOUS ONCE
Status: COMPLETED | OUTPATIENT
Start: 2019-04-15 | End: 2019-04-15

## 2019-04-15 RX ORDER — ACETAMINOPHEN 500 MG
1000 TABLET ORAL EVERY 6 HOURS PRN
Qty: 120 TABLET | Refills: 3 | Status: ON HOLD | OUTPATIENT
Start: 2019-04-15 | End: 2019-05-16 | Stop reason: HOSPADM

## 2019-04-15 RX ORDER — ONDANSETRON 4 MG/1
4 TABLET, FILM COATED ORAL DAILY PRN
Qty: 30 TABLET | Refills: 0 | Status: ON HOLD | OUTPATIENT
Start: 2019-04-15 | End: 2019-05-16 | Stop reason: HOSPADM

## 2019-04-15 RX ADMIN — CEFTRIAXONE SODIUM 2 G: 2 INJECTION, POWDER, FOR SOLUTION INTRAMUSCULAR; INTRAVENOUS at 11:35

## 2019-04-15 RX ADMIN — MAGNESIUM SULFATE HEPTAHYDRATE 2 G: 40 INJECTION, SOLUTION INTRAVENOUS at 11:26

## 2019-04-15 ASSESSMENT — PAIN SCALES - GENERAL
PAINLEVEL_OUTOF10: 0
PAINLEVEL_OUTOF10: 0

## 2019-04-15 NOTE — PLAN OF CARE
Problem: Risk for Impaired Skin Integrity  Goal: Tissue integrity - skin and mucous membranes  Description  Structural intactness and normal physiological function of skin and  mucous membranes. Outcome: Ongoing  Note:   No new skin issues noted during this shift. Patient has been turned and repositioned as recommended. Problem: Falls - Risk of:  Goal: Will remain free from falls  Description  Will remain free from falls  Outcome: Ongoing  Note:   Patient has remained free of falls during this shift. Appropriate fall prevention measures in place. Patient is compliant with using call light for assistance when needed. Problem: Daily Care:  Goal: Daily care needs are met  Description  Daily care needs are met  Outcome: Ongoing  Note:   Patient is dependent for daily care needs. Problem: Discharge Planning:  Goal: Patients continuum of care needs are met  Description  Patients continuum of care needs are met  Outcome: Ongoing  Note:   Discharge planning in progress. Patient is planning to return home with her  and HH at discharge. Case management assisting with discharge needs. Problem: Nutrition  Goal: Optimal nutrition therapy  Outcome: Ongoing  Note:   Patient is on a carb control diet. Denies any nausea or vomiting during this shift. Tolerates PO fluids well. Problem: GI  Goal: No bowel complications  Outcome: Ongoing  Note:   Bowel sounds active x 4. Patient is passing gas. No BM yet this shift. Problem: Musculor/Skeletal Functional Status  Goal: Highest potential functional level  Outcome: Ongoing     Problem: Infection - Central Venous Catheter-Associated Bloodstream Infection:  Goal: Will show no infection signs and symptoms  Description  Will show no infection signs and symptoms  Outcome: Ongoing  Note:   PICC line present on the left upper arm. Dressing is dry and intact without s/sx of infection.      Problem: Pain Control  Goal: Maintain pain level at or below patient's

## 2019-04-15 NOTE — CARE COORDINATION
4/15/19, 12:30 PM    DISCHARGE BARRIERS     spoke with [facility] and her . They are in agreement with discharge home today by [facility]. Transport request faxed to  [facility]. 4/15/19, 12:31 PM    Discharge plan discussed by  and . Discharge plan reviewed with patient/ family. Patient/ family verbalize understanding of discharge plan and are in agreement with plan. Understanding was demonstrated using the teach back method.        IMM Letter  IMM Letter given to Patient/Family/Significant other/Guardian/POA/by[de-identified]   IMM Letter date given[de-identified] 04/15/19  IMM Letter time given[de-identified] [id number]

## 2019-04-15 NOTE — PROGRESS NOTES
plan of care as follows:    [x]Discharge patient    Discharge Location:[facility]    Services/Supervision Recommended: [facility] [id number]

## 2019-04-15 NOTE — PROGRESS NOTES
Progress note: Infectious diseases    Patient - Lynnie Apley,  Age - 72 y.o.    - 1945      Room Number - 7K-20/020-A   MRN -  [id number]   Acct # - [de-identified]  Date of Admission -  2019  5:20 PM    SUBJECTIVE:   No new concerns. Plan for discharge home today. Awaiting home health orders. OBJECTIVE   VITALS    height is 5' (1.524 m) and weight is 213 lb 14.4 oz (97 kg). Her oral temperature is 98.3 °F (36.8 °C). Her blood pressure is 129/62 and her pulse is 63. Her respiration is 16 and oxygen saturation is 94%. Wt Readings from Last 3 Encounters:   19 213 lb 14.4 oz (97 kg)       I/O (24 Hours)    Intake/Output Summary (Last 24 hours) at 4/15/2019 7622  Last data filed at 2019 1311  Gross per 24 hour   Intake 257.75 ml   Output --   Net 257.75 ml       General Appearance  Awake, alert, oriented,    HEENT - normocephalic, atraumatic, pink conjunctiva,  anicteric sclera  Neck - Supple, no mass  Lungs -  Bilateral good air entry, no rhonchi, no wheeze  Cardiovascular - Heart sounds are normal.     Abdomen - soft, not distended, nontender,   Neurologic -right  weakness  Skin - No bruising or bleeding  Extremities - picc line on the left upper arm.     MEDICATIONS:      clopidogrel  75 mg Oral Daily    magnesium replacement protocol   Other RX Placeholder    potassium (CARDIAC) replacement protocol   Other RX Placeholder    Magnesium Oxide  400 mg Oral BID    docusate sodium  100 mg Oral BID    ceFAZolin  1 g Intravenous 60 Min Pre-Op    iohexol  20 mL Other Once    pantoprazole  40 mg Oral QAM AC    miconazole   Topical BID    cefTRIAXone (ROCEPHIN) IV  2 g Intravenous Q24H    lactobacillus  1 capsule Oral Daily with breakfast    amLODIPine  5 mg Oral Daily    atorvastatin  20 mg Oral Nightly    calcium elemental  500 mg Oral Daily    citalopram  10 mg Oral Daily    lisinopril 20 mg Oral BID    metoprolol tartrate  50 mg Oral BID    insulin lispro  0-6 Units Subcutaneous TID WC    insulin lispro  0-3 Units Subcutaneous Nightly      dextrose       docusate sodium, ondansetron, promethazine, acetaminophen, glucose, dextrose, glucagon (rDNA), dextrose      LABS:     CBC:   Recent Labs     04/13/19  0656   HGB 9.8*     BMP:    Recent Labs     04/13/19  0656 04/14/19  0823 04/15/19  0726   * 131* 131*   K 4.0 4.4 5.0   CL 94* 94* 95*   CO2 29 28 27   BUN 6* 5* 6*   CREATININE 0.3* 0.4 0.4   GLUCOSE 122* 129* 122*     Calcium:  Recent Labs     04/15/19  0726   CALCIUM 8.4*     Ionized Calcium:No results for input(s): IONCA in the last 72 hours. Magnesium:  Recent Labs     04/15/19  0726   MG 1.5*     Phosphorus:No results for input(s): PHOS in the last 72 hours. BNP:No results for input(s): BNP in the last 72 hours. Glucose:  Recent Labs     04/14/19  1034 04/14/19  1522 04/15/19  0628   POCGLU 179* 255* 122*     HgbA1C: No results for input(s): LABA1C in the last 72 hours. INR:   No results for input(s): INR in the last 72 hours.         Problem list of patient:     Patient Active Problem List   Diagnosis Code    Osteomyelitis ([facility] M86.9    Acute cystitis without hematuria N30.00    Type 2 diabetes mellitus with other specified complication ([facility] [address] K01.67    Essential hypertension I10    Severe protein-calorie malnutrition ([facility] [address] E43    Examination for normal comparison for clinical research Z00.6    Discitis M46.40    Oliguria R34    Morbid obesity ([facility] [address] E66.01    History of CVA (cerebrovascular accident) Z86.73    Hypoglycemia E16.2    Loose stools R19.5    Hyponatremia E87.1    Hypokalemia E87.6    Moderate malnutrition (HCC) E44.0    Urinary retention R33.9    Recurrent UTI N39.0    Nausea and vomiting R11.2    Pleural effusion on right J90    Hypomagnesemia E83.42    Bilateral atelectasis J98.11    Normocytic anemia D64.9    Generalized weakness R53.1 ASSESSMENT/PLAN   · Discitis/osteomyelitis of the thoracic spine: biopsy growing proteus  · History of recurrent urinary tract infection with recent history of Bacteremia due to Proteus. likely source for the discites  · History of CVA with right-sided weakness  · Limited mobility with deconditioning. · She'll be discharged home to continue IV antibiotics for a total of 6 weeks.   · Follow-up will be scheduled in 2 weeks at the wound clinic            Aquilino Dougherty MD, [address] 4/15/2019 8:21 AM

## 2019-04-15 NOTE — PROGRESS NOTES
Hospitalist Progress Note    Patient:  Sandra Mendez      Unit/Bed:7K-20/020-A    YOB: 1945    MRN: [id number]       Acct: [de-identified]     PCP: Veronica Haile MD    Date of Admission: 4/4/2019    Assessment/Plan:    1. Discitis/osteomyelitis of the T9-10 thoracic spine s/p biopsy and aspiration on 4/8/2019 with cx growing Proteus mirabilis--apprec ID input; feels likely related to recent Proteus bacteremia (at outside hospital) and frequent UTIs; Rocephin (S) 4/5; ortho spine recommends continuing IV atbx x 6 weeks and f/u after atbx and if no improvement may need possible intervention; need to F/U with Dr Niharika Mckeon in 2 weeks; ATB and labs already ordered per him  2. N/V--labile; feels 2nd to ATB; doing better; eating better  3. Urinary retention--apprec urology assist;  pt and family refused ayers   5. Small right pleural effusion--on RA  6. RLL atelectasis--IS as able   7. Hypoglycemia--resolved   8. Hyponatremia--down to 131; monitor   8. Hypokalemia--replace per protocol;   9. Hypomagnesemia--replace and monitor PRN; on daily supplement BID  10. Hx recurrent UTIs--likely related to retention; urology recommends cranberry extract and D mannose  11. Essential HTN, uncontrolled--Norvasc, ACE-I, BB; monitor  12. DM-2, uncontrolled--glucose levels low at times but improving;  holding home metformin, Lantus with N/V and poor PO intake; on SSI; last A1C per [facility] 2/9/19 = 9.5; accurate dose of Lantus not noted in Epic  13. Normocytic anemia--stable  14. Constipation--having BM's; stool softeners prn  15. Nephrolithiasis--noted per CT abd; no hydronephrosis; needs follow up with urology  16. Hx CVA with residual right hemiparesis and dysphagia--Plavix, statin  17. Morbid Obesity with BMI 41.9   18. Generalized weakness--PT/OT      Expected discharge date:   Today/tomorrow    Please note patient was discharged today per RN without order and meds reconciled    Disposition: [x] Home       [] TCU       [] Rehab       [] Psych       [] SNF       [] [place]       [] Other-    Chief Complaint: N/V    Hospital Course: as per Dr ARMENDARIZ note from 4/13 \" Paul Webster is a 72 y.o. female with hx of CVA with right hemiparesis, dysphagia and WC bound and mobilizes via criss lift, DM2, CKD, GERD, HTN transferred from [facility] for possible discitis at T8-9 with possible OM. ~6 weeks prior admitted there for Proteus bacteremia. [facility]ed to [facility] for [facility] weeks of n/v.  ID consulted on admission - Dr. Josiah vasquez done - s/p IR bx of T9-10 disc space aspiration and bx which is growing proteus mirabilis. On IV rocephin since 4/5 and per ID plan 6 weeks IV atbx. Repeat blood cx on admission (-).  N/v up and down.   -- ortho spine consulted and rec complete 6 wks of IV atbx and re-eval after\"    4/15-->pt feels that she is doing better; spouse at bedside; pt denies any complaints at this time; wanting to eat breakfast; labs reviewed and mag at 1.5 and Na at 131; mag not replaced 4/14 and level was 1.5    Subjective (past 24 hours): offers no complaints; she is weak     Medications:  Reviewed    Infusion Medications    dextrose       Scheduled Medications    clopidogrel  75 mg Oral Daily    magnesium replacement protocol   Other RX Placeholder    potassium (CARDIAC) replacement protocol   Other RX Placeholder    Magnesium Oxide  400 mg Oral BID    docusate sodium  100 mg Oral BID    ceFAZolin  1 g Intravenous 60 Min Pre-Op    iohexol  20 mL Other Once    pantoprazole  40 mg Oral QAM AC    miconazole   Topical BID    cefTRIAXone (ROCEPHIN) IV  2 g Intravenous Q24H    lactobacillus  1 capsule Oral Daily with breakfast    amLODIPine  5 mg Oral Daily    atorvastatin  20 mg Oral Nightly    calcium elemental  500 mg Oral Daily    citalopram  10 mg Oral Daily    lisinopril  20 mg Oral BID    metoprolol tartrate  50 mg Oral BID    insulin lispro  0-6 Units Subcutaneous TID WC    insulin lispro  0-3 Units Subcutaneous Nightly     PRN Meds: docusate sodium, ondansetron, promethazine, acetaminophen, glucose, dextrose, glucagon (rDNA), dextrose      Intake/Output Summary (Last 24 hours) at 4/15/2019 0743  Last data filed at 4/14/2019 1311  Gross per 24 hour   Intake 257.75 ml   Output --   Net 257.75 ml       Diet:  Dietary Nutrition Supplements: Frozen Oral Supplement  DIET CARB CONTROL; No Drinking Straw    Exam:  BP (!) 111/56   Pulse 65   Temp 98.3 °F (36.8 °C) (Oral)   Resp 16   Ht 5' (1.524 m)   Wt 213 lb 14.4 oz (97 kg) Comment: pts  states he thinks she weighs less, will zero bed next time pt is out of it  LMP  (LMP Unknown) Comment: post menopase  SpO2 93%   Breastfeeding? No   BMI 41.77 kg/m²     General appearance: No apparent distress, appears stated age and cooperative. HEENT: Conjunctivae/corneas clear. Neck: Supple, with full range of motion. No jugular venous distention. Trachea midline. Respiratory:  Normal respiratory effort. Clear to auscultation, bilaterally without Rales/Wheezes/Rhonchi. Cardiovascular: Regular rate and rhythm with normal S1/S2 without murmurs, rubs or gallops. Abdomen: Soft, non-tender, non-distended with normal bowel sounds. Musculoskeletal: passive and active ROM x 4 extremities. Skin: Skin color, texture, turgor normal.    Neurologic:  Neurovascularly intact without any focal sensory/motor deficits.  Cranial nerves: II-XII intact, grossly non-focal. Weak all over  Psychiatric: Alert and oriented, thought content appropriate  Capillary Refill: Brisk,< 3 seconds   Peripheral Pulses: +2 palpable, equal bilaterally       Labs:   Recent Labs     04/13/19  0656   HGB 9.8*   HCT 31.3*     Recent Labs     04/13/19  0656 04/14/19  0823   * 131*   K 4.0 4.4   CL 94* 94*   CO2 29 28   BUN 6* 5*   CREATININE 0.3* 0.4   CALCIUM 8.4* 8.5     No results for input(s): AST, ALT, BILIDIR, BILITOT, ALKPHOS in the last 72 hours. No results for input(s): INR in the last 72 hours. No results for input(s): Eward Pong in the last 72 hours. Microbiology:    Proteus mirabilis    Urinalysis:      Lab Results   Component Value Date    NITRU NEGATIVE 04/05/2019    BLOODU NEGATIVE 04/05/2019    GLUCOSEU NEGATIVE 04/05/2019       Radiology:  XR CHEST PORTABLE   Final Result   1. Persistent right lower lobe atelectasis and or infiltrate changes with associated effusion. Overall appearance is unchanged compared to prior study         **This report has been created using voice recognition software. It may contain minor errors which are inherent in voice recognition technology. **      Final report electronically signed by Dr. Amina Mendiola on 4/12/2019 5:17 AM      RADIOLOGY REPORT   Final Result      XR CHEST PORTABLE   Final Result         1. Interval placement of left PICC line with tip in the SVC. 2. Right pleural effusion possibly loculated. Bilateral airspace opacity correlate for edema, atelectasis or pneumonia. 3. Mild cardiomegaly. Prominent of the hilar structures bilaterally which may be secondary to vascular crowding or underlying lymphadenopathy            **This report has been created using voice recognition software. It may contain minor errors which are inherent in voice recognition technology. **      Final report electronically signed by Dr. Hilary Puente on 4/10/2019 2:17 PM      XR CHEST PORTABLE   Final Result   1. Small right-sided pleural effusion. 2. Bibasilar consolidative airspace opacities are present and may represent atelectasis or pneumonia. 3. Prominent appearance of the bilateral mahin suggesting underlying pulmonary vascular congestion versus lymphadenopathy. 4. Mild enlargement of the cardiac mediastinal silhouette. 5. No pneumothorax is seen. **This report has been created using voice recognition software.   It may contain minor errors which are inherent in voice recognition technology. **      Final report electronically signed by Dr. Concepción Taylor on 4/8/2019 7:21 PM      IR FLUORO GUIDED NEEDLE PLACEMENT   Final Result   1. Successful fluoroscopic-guided T9-T10 intervertebral disc space aspiration and biopsy as described above. **This report has been created using voice recognition software. It may contain minor errors which are inherent in voice recognition technology. **      Final report electronically signed by Dr. Concepción Taylor on 4/8/2019 7:39 PM      MRI COMPARISON OF OUTSIDE FILMS   Final Result      CT COMPARISON OF OUTSIDE FILMS   Final Result          DVT prophylaxis: [] Lovenox                                 [x] SCDs                                 [] SQ Heparin                                 [] Encourage ambulation           [] Already on Anticoagulation     Code Status: Full Code    PT/OT Eval Status: on case    Tele:   [] yes             [x] no    Active Hospital Problems    Diagnosis Date Noted    Nausea and vomiting [R11.2]     Pleural effusion on right [J90]     Hypomagnesemia [E83.42]     Bilateral atelectasis [J98.11]     Normocytic anemia [D64.9]     Generalized weakness [R53.1]     Moderate malnutrition ([address] [E44.0] 04/08/2019     Class: Acute    Urinary retention [R33.9]     Recurrent UTI [N39.0]     Hypoglycemia [E16.2]     Loose stools [R19.5]     Hyponatremia [E87.1]     Hypokalemia [E87.6]     Severe protein-calorie malnutrition ([address] [E43] 04/05/2019    Examination for normal comparison for clinical research [Z00.6]     Discitis [M46.40]     Oliguria [R34]     Morbid obesity ([address] [E66.01]     History of CVA (cerebrovascular accident) [Z86.73]     Osteomyelitis ([address] [M86.9] 04/04/2019    Acute cystitis without hematuria [N30.00] 04/04/2019    Type 2 diabetes mellitus with other specified complication ([address] [R92.43] 04/04/2019    Essential hypertension [I10] 04/04/2019       Electronically signed by ELIZABETH Patterson

## 2019-04-18 NOTE — DISCHARGE SUMMARY
Hospital Medicine Discharge Summary      Patient Identification:   Maria Teresa Vidales   : 1945  MRN: [id number]   Account: [de-identified]      Patient's PCP: Eleazar Draper MD    Admit Date: 2019     Discharge Date: 4/15/2019      Admitting Physician: Suzy Boss DO     Discharge Nurse Practitioner: Karly Brock, APRN - CNP     Discharge Diagnoses: Active Hospital Problems    Diagnosis Date Noted    Nausea and vomiting [R11.2]     Pleural effusion on right [J90]     Hypomagnesemia [E83.42]     Bilateral atelectasis [J98.11]     Normocytic anemia [D64.9]     Generalized weakness [R53.1]     Moderate malnutrition ([address] [E44.0] 2019     Class: Acute    Urinary retention [R33.9]     Recurrent UTI [N39.0]     Hypoglycemia [E16.2]     Loose stools [R19.5]     Hyponatremia [E87.1]     Hypokalemia [E87.6]     Severe protein-calorie malnutrition ([address] [E43] 2019    Examination for normal comparison for clinical research [Z00.6]     Discitis [M46.40]     Oliguria [R34]     Morbid obesity ([address] [E66.01]     History of CVA (cerebrovascular accident) [Z86.73]     Osteomyelitis ([address] [M86.9] 2019    Acute cystitis without hematuria [N30.00] 2019    Type 2 diabetes mellitus with other specified complication ([address] [A74.65] 2019    Essential hypertension [I10] 2019       The patient was seen and examined on day of discharge and this discharge summary is in conjunction with any daily progress note from day of discharge. Hospital Course: As per Dr ARMENDARIZ note from  \" Abbey Al a 72 y. o. female with hx of CVA with right hemiparesis, dysphagia and WC bound and mobilizes via criss lift, DM2, CKD, GERD, HTN transferred from [facility] for possible discitis at T8-9 with possible OM.   ~6 weeks prior admitted there for Proteus bacteremia.  [facility] weeks of n/v.  ID consulted on admission - Dr. Jose Jamison eval done - s/p IR bx of T9-10 disc space aspiration and bx which is growing proteus mirabilis.  On IV rocephin since 4/5 and per ID plan 6 weeks IV atbx.  Repeat blood cx on admission (-).  N/v up and down. -- ortho spine consulted and rec complete 6 wks of IV atbx and re-eval after\"     4/15-->pt feels that she is doing better; spouse at bedside; pt denies any complaints at this time; wanting to eat breakfast; labs reviewed and mag at 1.5 and Na at 131; mag not replaced 4/14 and level was 1.5; pt was discharged without order; I called spouse on April 16 to check on her status and he stated that she had a good night and that she was eating a little bit better; he states she did have a small emesis as she did not want to take the Zofran however improved after taking her Zofran; he has home health set up; I explained to the spouse that her sugars need to be monitored closely and to notify her PCP; he clarified the Lantus dose for me as he states she takes 20 units BID which she was not receiving here secondary to poor PO intake and lower end sugars-he expressed understanding and was very appreciative of my phone call. 1. Discitis/osteomyelitis of the T9-10 thoracic spine s/p biopsy and aspiration on 4/8/2019 with cx growing Proteus mirabilis--apprec ID input; feels likely related to recent Proteus bacteremia (at outside hospital) and frequent UTIs; Rocephin (S) 4/5; ortho spine recommends continuing IV atbx x 6 weeks and f/u after atbx and if no improvement may need possible intervention; need to F/U with Dr Jay Scott in 2 weeks; ATB and labs already ordered per him  2. N/V--labile; feels 2nd to ATB; doing better; eating better  3. Urinary retention--apprec urology assist;  pt and family refused ayers   5. Small right pleural effusion--on RA  6. RLL atelectasis--IS as able   7. Hypoglycemia--resolved   8. Hyponatremia--down to 131; monitor   8. Hypokalemia--replace per protocol;   9.  Hypomagnesemia--replace and monitor PRN; on daily supplement BID  10. Hx recurrent UTIs--likely related to retention; urology recommends cranberry extract and D mannose  11. Essential HTN, uncontrolled--Norvasc, ACE-I, BB; monitor  12. DM-2, uncontrolled--glucose levels low at times but improving;  holding home metformin, Lantus with N/V and poor PO intake; on SSI; last A1C per [facility] records 2/9/19 = 9.5; accurate dose of Lantus not noted in Epic  13. Normocytic anemia--stable  14. Constipation--having BM's; stool softeners prn  15. Nephrolithiasis--noted per CT abd; no hydronephrosis; needs follow up with urology  16. Hx CVA with residual right hemiparesis and dysphagia--Plavix, statin  17. Morbid Obesity with BMI 41.9   18. Generalized weakness--PT/OT            Exam:     Vitals:  Vitals:    04/14/19 1649 04/14/19 2210 04/15/19 0515 04/15/19 0800   BP: (!) 112/56 124/60 (!) 111/56 129/62   Pulse: 63 78 65 63   Resp: 18 16 16 16   Temp: 98.2 °F (36.8 °C)  98.3 °F (36.8 °C) 98.3 °F (36.8 °C)   TempSrc: Oral  Oral Oral   SpO2: 94% 92% 93% 94%   Weight:       Height:         Weight: Weight: 213 lb 14.4 oz (97 kg)(pts  states he thinks she weighs less, will zero bed next time pt is out of it)     24 hour intake/output:No intake or output data in the 24 hours ending 04/18/19 1518      General appearance: No apparent distress, appears stated age and cooperative. HEENT: Conjunctivae/corneas clear. Neck: Supple, with full range of motion. No jugular venous distention. Trachea midline. Respiratory:  Normal respiratory effort. Clear to auscultation, bilaterally without Rales/Wheezes/Rhonchi. Cardiovascular: Regular rate and rhythm with normal S1/S2 without murmurs, rubs or gallops. Abdomen: Soft, non-tender, non-distended with normal bowel sounds. Musculoskeletal: passive and active ROM x 4 extremities. Skin: Skin color, texture, turgor normal.    Neurologic:  Neurovascularly intact without any focal sensory/motor deficits.  Cranial atelectasis or pneumonia. 3. Prominent appearance of the bilateral mahin suggesting underlying pulmonary vascular congestion versus lymphadenopathy. 4. Mild enlargement of the cardiac mediastinal silhouette. 5. No pneumothorax is seen. **This report has been created using voice recognition software. It may contain minor errors which are inherent in voice recognition technology. **      Final report electronically signed by Dr. Erich Mack on 4/8/2019 7:21 PM      IR FLUORO GUIDED NEEDLE PLACEMENT   Final Result   1. Successful fluoroscopic-guided T9-T10 intervertebral disc space aspiration and biopsy as described above. **This report has been created using voice recognition software. It may contain minor errors which are inherent in voice recognition technology. **      Final report electronically signed by Dr. Erich Mack on 4/8/2019 7:39 PM      MRI COMPARISON OF OUTSIDE FILMS   Final Result      CT COMPARISON OF OUTSIDE FILMS   Final Result             Consults:     IP CONSULT TO SOCIAL WORK  IP CONSULT TO PALLIATIVE CARE  PHARMACY TO DOSE VANCOMYCIN  IP CONSULT TO INFECTIOUS DISEASES  IP CONSULT TO DIETITIAN  IP CONSULT TO UROLOGY  IP CONSULT TO DIETITIAN  IP CONSULT TO ORTHOPEDIC SURGERY  IP CONSULT TO SOCIAL WORK  IP CONSULT TO HOME CARE NEEDS  IP CONSULT TO HOME CARE NEEDS    Disposition:    [x] Home with [facility]       [] TCU       [] Rehab       [] Psych       [] SNF       [] [facility]       [] Other-    Condition at Discharge: Stable    Code Status:  Prior     Pending tests at discharge:    Patient Instructions:    Discharge lab work: per Dr Alex Eckert  Activity: activity as tolerated  Diet: No diet orders on file      Follow-up visits:   [address]        [address] [id number] [place] [id number]  [phone]    In 2 weeks  For post- hospital follow up Discharge Medications:      Baldomero Uriarte   Home Medication Instructions [id number]    Printed on:04/18/19 5188   Medication Information                      acetaminophen (TYLENOL) 500 MG tablet  Take 2 tablets by mouth every 6 hours as needed for Pain             amLODIPine (NORVASC) 5 MG tablet  Take 5 mg by mouth daily             atorvastatin (LIPITOR) 20 MG tablet  Take 20 mg by mouth nightly             calcium carbonate 600 MG TABS tablet  Take 1 tablet by mouth daily             cefTRIAXone (ROCEPHIN) 500 MG injection  Infuse 2,000 mg intravenously every 24 hours             citalopram (CELEXA) 10 MG tablet  Take 10 mg by mouth daily             clopidogrel (PLAVIX) 75 MG tablet  Take 75 mg by mouth nightly             docusate sodium (COLACE) 100 MG capsule  Take 100 mg by mouth daily             insulin glargine (LANTUS) 100 UNIT/ML injection vial  Inject into the skin nightly             lisinopril (PRINIVIL;ZESTRIL) 20 MG tablet  Take 20 mg by mouth 2 times daily             magnesium 30 MG tablet  Take 250 mg by mouth daily             metoprolol tartrate (LOPRESSOR) 50 MG tablet  Take 50 mg by mouth 2 times daily             ondansetron (ZOFRAN) 4 MG tablet  Take 1 tablet by mouth daily as needed for Nausea or Vomiting             ranitidine (ZANTAC) 150 MG capsule  Take 150 mg by mouth 2 times daily             senna (SENOKOT) 8.6 MG tablet  Take 1 tablet by mouth as needed for Constipation                 Time Spent on discharge is more than 38 minutes in the examination, evaluation, counseling and review of medications and discharge plan. Signed: Thank you Steffi Su MD for the opportunity to be involved in this patient's care.     Electronically signed by ELIZABETH Duarte CNP on 4/18/2019 at 3:18 PM

## 2019-05-08 ENCOUNTER — HOSPITAL ENCOUNTER (INPATIENT)
Age: 74
LOS: 9 days | Discharge: HOSPICE | DRG: 659 | End: 2019-05-17
Attending: FAMILY MEDICINE | Admitting: INTERNAL MEDICINE
Payer: MEDICARE

## 2019-05-08 DIAGNOSIS — Z00.6 EXAMINATION FOR NORMAL COMPARISON FOR CLINICAL RESEARCH: ICD-10-CM

## 2019-05-08 PROBLEM — N20.1 URETERAL STONE: Status: ACTIVE | Noted: 2019-05-08

## 2019-05-08 LAB
ANION GAP SERPL CALCULATED.3IONS-SCNC: 19 MEQ/L (ref 8–16)
BASOPHILS # BLD: 0.2 %
BASOPHILS ABSOLUTE: 0 THOU/MM3 (ref 0–0.1)
BUN BLDV-MCNC: 7 MG/DL (ref 7–22)
CALCIUM SERPL-MCNC: 8.5 MG/DL (ref 8.5–10.5)
CHLORIDE BLD-SCNC: 100 MEQ/L (ref 98–111)
CO2: 24 MEQ/L (ref 23–33)
CREAT SERPL-MCNC: 0.9 MG/DL (ref 0.4–1.2)
EKG ATRIAL RATE: 98 BPM
EKG P AXIS: 5 DEGREES
EKG P-R INTERVAL: 138 MS
EKG Q-T INTERVAL: 350 MS
EKG QRS DURATION: 86 MS
EKG QTC CALCULATION (BAZETT): 446 MS
EKG R AXIS: -22 DEGREES
EKG T AXIS: 151 DEGREES
EKG VENTRICULAR RATE: 98 BPM
EOSINOPHIL # BLD: 0 %
EOSINOPHILS ABSOLUTE: 0 THOU/MM3 (ref 0–0.4)
ERYTHROCYTE [DISTWIDTH] IN BLOOD BY AUTOMATED COUNT: 18.6 % (ref 11.5–14.5)
ERYTHROCYTE [DISTWIDTH] IN BLOOD BY AUTOMATED COUNT: 55.9 FL (ref 35–45)
GFR SERPL CREATININE-BSD FRML MDRD: 62 ML/MIN/1.73M2
GLUCOSE BLD-MCNC: 142 MG/DL (ref 70–108)
HCT VFR BLD CALC: 31.7 % (ref 37–47)
HEMOGLOBIN: 9.7 GM/DL (ref 12–16)
IMMATURE GRANS (ABS): 0.13 THOU/MM3 (ref 0–0.07)
IMMATURE GRANULOCYTES: 0.8 %
LYMPHOCYTES # BLD: 6.6 %
LYMPHOCYTES ABSOLUTE: 1.1 THOU/MM3 (ref 1–4.8)
MAGNESIUM: 1.3 MG/DL (ref 1.6–2.4)
MCH RBC QN AUTO: 25.3 PG (ref 26–33)
MCHC RBC AUTO-ENTMCNC: 30.6 GM/DL (ref 32.2–35.5)
MCV RBC AUTO: 82.6 FL (ref 81–99)
MONOCYTES # BLD: 9.1 %
MONOCYTES ABSOLUTE: 1.5 THOU/MM3 (ref 0.4–1.3)
NUCLEATED RED BLOOD CELLS: 0 /100 WBC
PLATELET # BLD: 363 THOU/MM3 (ref 130–400)
PMV BLD AUTO: 9.1 FL (ref 9.4–12.4)
POTASSIUM SERPL-SCNC: 3.3 MEQ/L (ref 3.5–5.2)
RBC # BLD: 3.84 MILL/MM3 (ref 4.2–5.4)
SEG NEUTROPHILS: 83.3 %
SEGMENTED NEUTROPHILS ABSOLUTE COUNT: 13.9 THOU/MM3 (ref 1.8–7.7)
SODIUM BLD-SCNC: 143 MEQ/L (ref 135–145)
WBC # BLD: 16.7 THOU/MM3 (ref 4.8–10.8)

## 2019-05-08 PROCEDURE — 93005 ELECTROCARDIOGRAM TRACING: CPT | Performed by: PHYSICIAN ASSISTANT

## 2019-05-08 PROCEDURE — 99223 1ST HOSP IP/OBS HIGH 75: CPT | Performed by: PHYSICIAN ASSISTANT

## 2019-05-08 PROCEDURE — 1200000003 HC TELEMETRY R&B

## 2019-05-08 PROCEDURE — 51798 US URINE CAPACITY MEASURE: CPT

## 2019-05-08 PROCEDURE — 6360000002 HC RX W HCPCS: Performed by: FAMILY MEDICINE

## 2019-05-08 PROCEDURE — 80048 BASIC METABOLIC PNL TOTAL CA: CPT

## 2019-05-08 PROCEDURE — 85025 COMPLETE CBC W/AUTO DIFF WBC: CPT

## 2019-05-08 PROCEDURE — 2580000003 HC RX 258: Performed by: PHYSICIAN ASSISTANT

## 2019-05-08 PROCEDURE — 83735 ASSAY OF MAGNESIUM: CPT

## 2019-05-08 PROCEDURE — 36592 COLLECT BLOOD FROM PICC: CPT

## 2019-05-08 PROCEDURE — 36415 COLL VENOUS BLD VENIPUNCTURE: CPT

## 2019-05-08 RX ORDER — NICOTINE POLACRILEX 4 MG
15 LOZENGE BUCCAL PRN
Status: DISCONTINUED | OUTPATIENT
Start: 2019-05-08 | End: 2019-05-17 | Stop reason: HOSPADM

## 2019-05-08 RX ORDER — MAGNESIUM SULFATE IN WATER 40 MG/ML
4 INJECTION, SOLUTION INTRAVENOUS ONCE
Status: COMPLETED | OUTPATIENT
Start: 2019-05-08 | End: 2019-05-09

## 2019-05-08 RX ORDER — METOCLOPRAMIDE HYDROCHLORIDE 5 MG/ML
10 INJECTION INTRAMUSCULAR; INTRAVENOUS EVERY 6 HOURS PRN
Status: DISCONTINUED | OUTPATIENT
Start: 2019-05-08 | End: 2019-05-17 | Stop reason: HOSPADM

## 2019-05-08 RX ORDER — ONDANSETRON 2 MG/ML
4 INJECTION INTRAMUSCULAR; INTRAVENOUS EVERY 6 HOURS PRN
Status: DISCONTINUED | OUTPATIENT
Start: 2019-05-08 | End: 2019-05-17 | Stop reason: HOSPADM

## 2019-05-08 RX ORDER — AMLODIPINE BESYLATE 5 MG/1
5 TABLET ORAL DAILY
Status: DISCONTINUED | OUTPATIENT
Start: 2019-05-09 | End: 2019-05-17 | Stop reason: HOSPADM

## 2019-05-08 RX ORDER — LISINOPRIL 20 MG/1
20 TABLET ORAL 2 TIMES DAILY
Status: DISCONTINUED | OUTPATIENT
Start: 2019-05-08 | End: 2019-05-17 | Stop reason: HOSPADM

## 2019-05-08 RX ORDER — SODIUM CHLORIDE 0.9 % (FLUSH) 0.9 %
10 SYRINGE (ML) INJECTION EVERY 12 HOURS SCHEDULED
Status: DISCONTINUED | OUTPATIENT
Start: 2019-05-08 | End: 2019-05-17 | Stop reason: HOSPADM

## 2019-05-08 RX ORDER — ACETAMINOPHEN 650 MG/1
650 SUPPOSITORY RECTAL EVERY 4 HOURS PRN
Status: DISCONTINUED | OUTPATIENT
Start: 2019-05-08 | End: 2019-05-17 | Stop reason: HOSPADM

## 2019-05-08 RX ORDER — ACETAMINOPHEN 500 MG
1000 TABLET ORAL EVERY 6 HOURS PRN
Status: DISCONTINUED | OUTPATIENT
Start: 2019-05-09 | End: 2019-05-10

## 2019-05-08 RX ORDER — CITALOPRAM 20 MG/1
10 TABLET ORAL DAILY
Status: DISCONTINUED | OUTPATIENT
Start: 2019-05-09 | End: 2019-05-17 | Stop reason: HOSPADM

## 2019-05-08 RX ORDER — DEXTROSE MONOHYDRATE 25 G/50ML
12.5 INJECTION, SOLUTION INTRAVENOUS PRN
Status: DISCONTINUED | OUTPATIENT
Start: 2019-05-08 | End: 2019-05-17 | Stop reason: HOSPADM

## 2019-05-08 RX ORDER — SODIUM CHLORIDE 9 MG/ML
INJECTION, SOLUTION INTRAVENOUS CONTINUOUS
Status: DISCONTINUED | OUTPATIENT
Start: 2019-05-08 | End: 2019-05-11

## 2019-05-08 RX ORDER — SODIUM CHLORIDE 0.9 % (FLUSH) 0.9 %
10 SYRINGE (ML) INJECTION PRN
Status: DISCONTINUED | OUTPATIENT
Start: 2019-05-08 | End: 2019-05-17 | Stop reason: HOSPADM

## 2019-05-08 RX ORDER — METOPROLOL TARTRATE 50 MG/1
50 TABLET, FILM COATED ORAL 2 TIMES DAILY
Status: DISCONTINUED | OUTPATIENT
Start: 2019-05-08 | End: 2019-05-17 | Stop reason: HOSPADM

## 2019-05-08 RX ORDER — POTASSIUM CHLORIDE 29.8 MG/ML
20 INJECTION INTRAVENOUS
Status: COMPLETED | OUTPATIENT
Start: 2019-05-08 | End: 2019-05-08

## 2019-05-08 RX ORDER — DEXTROSE MONOHYDRATE 50 MG/ML
100 INJECTION, SOLUTION INTRAVENOUS PRN
Status: DISCONTINUED | OUTPATIENT
Start: 2019-05-08 | End: 2019-05-17 | Stop reason: HOSPADM

## 2019-05-08 RX ORDER — PROMETHAZINE HYDROCHLORIDE 25 MG/ML
12.5 INJECTION, SOLUTION INTRAMUSCULAR; INTRAVENOUS EVERY 6 HOURS PRN
Status: DISCONTINUED | OUTPATIENT
Start: 2019-05-08 | End: 2019-05-17 | Stop reason: HOSPADM

## 2019-05-08 RX ORDER — INSULIN GLARGINE 100 [IU]/ML
20 INJECTION, SOLUTION SUBCUTANEOUS 2 TIMES DAILY
Status: DISCONTINUED | OUTPATIENT
Start: 2019-05-08 | End: 2019-05-17 | Stop reason: HOSPADM

## 2019-05-08 RX ADMIN — MAGNESIUM SULFATE HEPTAHYDRATE 4 G: 40 INJECTION, SOLUTION INTRAVENOUS at 22:24

## 2019-05-08 RX ADMIN — Medication 10 ML: at 22:39

## 2019-05-08 RX ADMIN — POTASSIUM CHLORIDE 20 MEQ: 29.8 INJECTION, SOLUTION INTRAVENOUS at 22:26

## 2019-05-08 RX ADMIN — PROMETHAZINE HYDROCHLORIDE 12.5 MG: 25 INJECTION INTRAMUSCULAR; INTRAVENOUS at 20:27

## 2019-05-08 RX ADMIN — SODIUM CHLORIDE: 9 INJECTION, SOLUTION INTRAVENOUS at 22:22

## 2019-05-08 RX ADMIN — POTASSIUM CHLORIDE 20 MEQ: 29.8 INJECTION, SOLUTION INTRAVENOUS at 23:31

## 2019-05-08 ASSESSMENT — PAIN SCALES - GENERAL
PAINLEVEL_OUTOF10: 0
PAINLEVEL_OUTOF10: 0

## 2019-05-08 NOTE — PROGRESS NOTES
Transfer from [facility], pt name Chas Marin [birth year] pt was just adm 4-4 then transferred to [facility] for osteomyelitis of spine, seeing ID but missed her FU picc line ATB 2 days, has hx of vomiting but has been worse since last night like every 15 m. pt had CT which showed R pleural eff and 8 mm stone in R prox ureter.  She had a thoracentesis when she was here last, chest xray normal, K 2.9 WBC 16T afebrile, on Rocephin, Hx elevated mag it was 1.3 giving 40meq K now, Hgb 10.3 BUN 6 CR 0.67 GFR 92 Zofran not working giving Phenergan BP was elevated but now 154/89 no meds given, unable to keep home meds down HR 94 16 95 % urine and VBG not back yet, Hx CVA DM HTN BS ok bedbound hx obesity, 4a08 Dr Marcell Edwards

## 2019-05-09 ENCOUNTER — APPOINTMENT (OUTPATIENT)
Dept: GENERAL RADIOLOGY | Age: 74
DRG: 659 | End: 2019-05-09
Attending: FAMILY MEDICINE
Payer: MEDICARE

## 2019-05-09 ENCOUNTER — ANESTHESIA (OUTPATIENT)
Dept: OPERATING ROOM | Age: 74
DRG: 659 | End: 2019-05-09
Payer: MEDICARE

## 2019-05-09 ENCOUNTER — APPOINTMENT (OUTPATIENT)
Dept: CT IMAGING | Age: 74
DRG: 659 | End: 2019-05-09
Attending: FAMILY MEDICINE
Payer: MEDICARE

## 2019-05-09 ENCOUNTER — ANESTHESIA EVENT (OUTPATIENT)
Dept: OPERATING ROOM | Age: 74
DRG: 659 | End: 2019-05-09
Payer: MEDICARE

## 2019-05-09 VITALS
RESPIRATION RATE: 1 BRPM | OXYGEN SATURATION: 100 % | SYSTOLIC BLOOD PRESSURE: 174 MMHG | DIASTOLIC BLOOD PRESSURE: 85 MMHG

## 2019-05-09 PROBLEM — E11.22 TYPE 2 DIABETES MELLITUS WITH STAGE 3 CHRONIC KIDNEY DISEASE, WITH LONG-TERM CURRENT USE OF INSULIN (HCC): Status: ACTIVE | Noted: 2019-04-04

## 2019-05-09 PROBLEM — N18.30 TYPE 2 DIABETES MELLITUS WITH STAGE 3 CHRONIC KIDNEY DISEASE, WITH LONG-TERM CURRENT USE OF INSULIN (HCC): Status: ACTIVE | Noted: 2019-04-04

## 2019-05-09 PROBLEM — E43 SEVERE MALNUTRITION (HCC): Status: ACTIVE | Noted: 2019-05-09

## 2019-05-09 LAB
AMORPHOUS: ABNORMAL
ANION GAP SERPL CALCULATED.3IONS-SCNC: 15 MEQ/L (ref 8–16)
BACTERIA: ABNORMAL /HPF
BASOPHILS # BLD: 0.1 %
BASOPHILS ABSOLUTE: 0 THOU/MM3 (ref 0–0.1)
BILIRUBIN URINE: ABNORMAL
BLOOD, URINE: ABNORMAL
BUN BLDV-MCNC: 7 MG/DL (ref 7–22)
CALCIUM SERPL-MCNC: 8.5 MG/DL (ref 8.5–10.5)
CASTS 2: ABNORMAL /LPF
CASTS UA: ABNORMAL /LPF
CHARACTER, URINE: ABNORMAL
CHLORIDE BLD-SCNC: 103 MEQ/L (ref 98–111)
CO2: 25 MEQ/L (ref 23–33)
COLOR: ABNORMAL
CREAT SERPL-MCNC: 0.8 MG/DL (ref 0.4–1.2)
CRYSTALS, UA: ABNORMAL
EOSINOPHIL # BLD: 0.1 %
EOSINOPHILS ABSOLUTE: 0 THOU/MM3 (ref 0–0.4)
EPITHELIAL CELLS, UA: ABNORMAL /HPF
ERYTHROCYTE [DISTWIDTH] IN BLOOD BY AUTOMATED COUNT: 18.7 % (ref 11.5–14.5)
ERYTHROCYTE [DISTWIDTH] IN BLOOD BY AUTOMATED COUNT: 56.6 FL (ref 35–45)
GFR SERPL CREATININE-BSD FRML MDRD: 71 ML/MIN/1.73M2
GLUCOSE BLD-MCNC: 101 MG/DL (ref 70–108)
GLUCOSE BLD-MCNC: 102 MG/DL (ref 70–108)
GLUCOSE BLD-MCNC: 106 MG/DL (ref 70–108)
GLUCOSE BLD-MCNC: 116 MG/DL (ref 70–108)
GLUCOSE BLD-MCNC: 127 MG/DL (ref 70–108)
GLUCOSE BLD-MCNC: 130 MG/DL (ref 70–108)
GLUCOSE BLD-MCNC: 138 MG/DL (ref 70–108)
GLUCOSE URINE: NEGATIVE MG/DL
HCT VFR BLD CALC: 29.1 % (ref 37–47)
HEMOGLOBIN: 9.1 GM/DL (ref 12–16)
ICTOTEST: NEGATIVE
IMMATURE GRANS (ABS): 0.09 THOU/MM3 (ref 0–0.07)
IMMATURE GRANULOCYTES: 0.6 %
KETONES, URINE: 40
LEUKOCYTE ESTERASE, URINE: ABNORMAL
LYMPHOCYTES # BLD: 8.5 %
LYMPHOCYTES ABSOLUTE: 1.2 THOU/MM3 (ref 1–4.8)
MAGNESIUM: 2.2 MG/DL (ref 1.6–2.4)
MCH RBC QN AUTO: 25.9 PG (ref 26–33)
MCHC RBC AUTO-ENTMCNC: 31.3 GM/DL (ref 32.2–35.5)
MCV RBC AUTO: 82.7 FL (ref 81–99)
MISCELLANEOUS 2: ABNORMAL
MISCELLANEOUS LAB TEST RESULT: ABNORMAL
MONOCYTES # BLD: 9.5 %
MONOCYTES ABSOLUTE: 1.4 THOU/MM3 (ref 0.4–1.3)
NITRITE, URINE: NEGATIVE
NUCLEATED RED BLOOD CELLS: 0 /100 WBC
PH UA: 6 (ref 5–9)
PLATELET # BLD: 326 THOU/MM3 (ref 130–400)
PMV BLD AUTO: 9.8 FL (ref 9.4–12.4)
POTASSIUM REFLEX MAGNESIUM: 4 MEQ/L (ref 3.5–5.2)
POTASSIUM SERPL-SCNC: 3.9 MEQ/L (ref 3.5–5.2)
PROTEIN UA: 100
RBC # BLD: 3.52 MILL/MM3 (ref 4.2–5.4)
RBC URINE: ABNORMAL /HPF
RENAL EPITHELIAL, UA: ABNORMAL
SEG NEUTROPHILS: 81.2 %
SEGMENTED NEUTROPHILS ABSOLUTE COUNT: 11.9 THOU/MM3 (ref 1.8–7.7)
SODIUM BLD-SCNC: 143 MEQ/L (ref 135–145)
SPECIFIC GRAVITY, URINE: 1.03 (ref 1–1.03)
UROBILINOGEN, URINE: 0.2 EU/DL (ref 0–1)
WBC # BLD: 14.6 THOU/MM3 (ref 4.8–10.8)
WBC UA: > 200 /HPF
YEAST: ABNORMAL

## 2019-05-09 PROCEDURE — 3600000013 HC SURGERY LEVEL 3 ADDTL 15MIN: Performed by: UROLOGY

## 2019-05-09 PROCEDURE — 6370000000 HC RX 637 (ALT 250 FOR IP): Performed by: PHYSICIAN ASSISTANT

## 2019-05-09 PROCEDURE — 6360000002 HC RX W HCPCS: Performed by: ANESTHESIOLOGY

## 2019-05-09 PROCEDURE — 82948 REAGENT STRIP/BLOOD GLUCOSE: CPT

## 2019-05-09 PROCEDURE — 81001 URINALYSIS AUTO W/SCOPE: CPT

## 2019-05-09 PROCEDURE — C1894 INTRO/SHEATH, NON-LASER: HCPCS | Performed by: UROLOGY

## 2019-05-09 PROCEDURE — 0TCB8ZZ EXTIRPATION OF MATTER FROM BLADDER, VIA NATURAL OR ARTIFICIAL OPENING ENDOSCOPIC: ICD-10-PCS | Performed by: UROLOGY

## 2019-05-09 PROCEDURE — 36415 COLL VENOUS BLD VENIPUNCTURE: CPT

## 2019-05-09 PROCEDURE — 92610 EVALUATE SWALLOWING FUNCTION: CPT

## 2019-05-09 PROCEDURE — 52332 CYSTOSCOPY AND TREATMENT: CPT | Performed by: UROLOGY

## 2019-05-09 PROCEDURE — 93010 ELECTROCARDIOGRAM REPORT: CPT | Performed by: NUCLEAR MEDICINE

## 2019-05-09 PROCEDURE — 3700000000 HC ANESTHESIA ATTENDED CARE: Performed by: UROLOGY

## 2019-05-09 PROCEDURE — 6360000002 HC RX W HCPCS: Performed by: PHYSICIAN ASSISTANT

## 2019-05-09 PROCEDURE — 3700000001 HC ADD 15 MINUTES (ANESTHESIA): Performed by: UROLOGY

## 2019-05-09 PROCEDURE — 36592 COLLECT BLOOD FROM PICC: CPT

## 2019-05-09 PROCEDURE — 2500000003 HC RX 250 WO HCPCS: Performed by: ANESTHESIOLOGY

## 2019-05-09 PROCEDURE — 84132 ASSAY OF SERUM POTASSIUM: CPT

## 2019-05-09 PROCEDURE — 2580000003 HC RX 258: Performed by: PHYSICIAN ASSISTANT

## 2019-05-09 PROCEDURE — 3209999900 CT COMPARISON OF OUTSIDE FILMS

## 2019-05-09 PROCEDURE — 85025 COMPLETE CBC W/AUTO DIFF WBC: CPT

## 2019-05-09 PROCEDURE — 80048 BASIC METABOLIC PNL TOTAL CA: CPT

## 2019-05-09 PROCEDURE — 2720000010 HC SURG SUPPLY STERILE: Performed by: UROLOGY

## 2019-05-09 PROCEDURE — 1200000003 HC TELEMETRY R&B

## 2019-05-09 PROCEDURE — APPSS60 APP SPLIT SHARED TIME 46-60 MINUTES: Performed by: NURSE PRACTITIONER

## 2019-05-09 PROCEDURE — 99223 1ST HOSP IP/OBS HIGH 75: CPT | Performed by: UROLOGY

## 2019-05-09 PROCEDURE — 2580000003 HC RX 258: Performed by: FAMILY MEDICINE

## 2019-05-09 PROCEDURE — 0TC68ZZ EXTIRPATION OF MATTER FROM RIGHT URETER, VIA NATURAL OR ARTIFICIAL OPENING ENDOSCOPIC: ICD-10-PCS | Performed by: UROLOGY

## 2019-05-09 PROCEDURE — 99999 PR OFFICE/OUTPT VISIT,PROCEDURE ONLY: CPT | Performed by: UROLOGY

## 2019-05-09 PROCEDURE — 71045 X-RAY EXAM CHEST 1 VIEW: CPT

## 2019-05-09 PROCEDURE — 99999 PR OFFICE/OUTPT VISIT,PROCEDURE ONLY: CPT | Performed by: NURSE PRACTITIONER

## 2019-05-09 PROCEDURE — 87106 FUNGI IDENTIFICATION YEAST: CPT

## 2019-05-09 PROCEDURE — 51701 INSERT BLADDER CATHETER: CPT

## 2019-05-09 PROCEDURE — 3209999900 XR COMPARISON OF OUTSIDE FILMS

## 2019-05-09 PROCEDURE — 99233 SBSQ HOSP IP/OBS HIGH 50: CPT | Performed by: INTERNAL MEDICINE

## 2019-05-09 PROCEDURE — 2709999900 HC NON-CHARGEABLE SUPPLY

## 2019-05-09 PROCEDURE — 7100000000 HC PACU RECOVERY - FIRST 15 MIN: Performed by: UROLOGY

## 2019-05-09 PROCEDURE — 83735 ASSAY OF MAGNESIUM: CPT

## 2019-05-09 PROCEDURE — 0T768DZ DILATION OF RIGHT URETER WITH INTRALUMINAL DEVICE, VIA NATURAL OR ARTIFICIAL OPENING ENDOSCOPIC: ICD-10-PCS | Performed by: UROLOGY

## 2019-05-09 PROCEDURE — 7100000001 HC PACU RECOVERY - ADDTL 15 MIN: Performed by: UROLOGY

## 2019-05-09 PROCEDURE — 52352 CYSTOURETERO W/STONE REMOVE: CPT | Performed by: UROLOGY

## 2019-05-09 PROCEDURE — 87086 URINE CULTURE/COLONY COUNT: CPT

## 2019-05-09 PROCEDURE — 2709999900 HC NON-CHARGEABLE SUPPLY: Performed by: UROLOGY

## 2019-05-09 PROCEDURE — 3600000003 HC SURGERY LEVEL 3 BASE: Performed by: UROLOGY

## 2019-05-09 PROCEDURE — 6360000002 HC RX W HCPCS: Performed by: FAMILY MEDICINE

## 2019-05-09 PROCEDURE — 82365 CALCULUS SPECTROSCOPY: CPT

## 2019-05-09 PROCEDURE — C2617 STENT, NON-COR, TEM W/O DEL: HCPCS | Performed by: UROLOGY

## 2019-05-09 PROCEDURE — 2500000003 HC RX 250 WO HCPCS: Performed by: INTERNAL MEDICINE

## 2019-05-09 DEVICE — VARIABLE LENGTH URETERAL STENT
Type: IMPLANTABLE DEVICE | Site: URETER | Status: FUNCTIONAL
Brand: CONTOUR VL™

## 2019-05-09 RX ORDER — PROPOFOL 10 MG/ML
INJECTION, EMULSION INTRAVENOUS PRN
Status: DISCONTINUED | OUTPATIENT
Start: 2019-05-09 | End: 2019-05-09 | Stop reason: SDUPTHER

## 2019-05-09 RX ORDER — POTASSIUM CHLORIDE 29.8 MG/ML
20 INJECTION INTRAVENOUS ONCE
Status: COMPLETED | OUTPATIENT
Start: 2019-05-09 | End: 2019-05-09

## 2019-05-09 RX ORDER — PHENYLEPHRINE HYDROCHLORIDE 10 MG/ML
INJECTION INTRAVENOUS PRN
Status: DISCONTINUED | OUTPATIENT
Start: 2019-05-09 | End: 2019-05-09 | Stop reason: SDUPTHER

## 2019-05-09 RX ORDER — DEXAMETHASONE SODIUM PHOSPHATE 4 MG/ML
INJECTION, SOLUTION INTRA-ARTICULAR; INTRALESIONAL; INTRAMUSCULAR; INTRAVENOUS; SOFT TISSUE PRN
Status: DISCONTINUED | OUTPATIENT
Start: 2019-05-09 | End: 2019-05-09 | Stop reason: SDUPTHER

## 2019-05-09 RX ORDER — FENTANYL CITRATE 50 UG/ML
INJECTION, SOLUTION INTRAMUSCULAR; INTRAVENOUS PRN
Status: DISCONTINUED | OUTPATIENT
Start: 2019-05-09 | End: 2019-05-09 | Stop reason: SDUPTHER

## 2019-05-09 RX ORDER — MORPHINE SULFATE 2 MG/ML
2 INJECTION, SOLUTION INTRAMUSCULAR; INTRAVENOUS EVERY 5 MIN PRN
Status: DISCONTINUED | OUTPATIENT
Start: 2019-05-09 | End: 2019-05-09 | Stop reason: HOSPADM

## 2019-05-09 RX ORDER — ROCURONIUM BROMIDE 10 MG/ML
INJECTION, SOLUTION INTRAVENOUS PRN
Status: DISCONTINUED | OUTPATIENT
Start: 2019-05-09 | End: 2019-05-09 | Stop reason: SDUPTHER

## 2019-05-09 RX ORDER — LABETALOL HYDROCHLORIDE 5 MG/ML
10 INJECTION, SOLUTION INTRAVENOUS EVERY 10 MIN PRN
Status: DISCONTINUED | OUTPATIENT
Start: 2019-05-09 | End: 2019-05-09 | Stop reason: HOSPADM

## 2019-05-09 RX ORDER — FENTANYL CITRATE 50 UG/ML
50 INJECTION, SOLUTION INTRAMUSCULAR; INTRAVENOUS EVERY 5 MIN PRN
Status: DISCONTINUED | OUTPATIENT
Start: 2019-05-09 | End: 2019-05-09 | Stop reason: HOSPADM

## 2019-05-09 RX ADMIN — ONDANSETRON 4 MG: 2 INJECTION INTRAMUSCULAR; INTRAVENOUS at 02:24

## 2019-05-09 RX ADMIN — FENTANYL CITRATE 50 MCG: 50 INJECTION INTRAMUSCULAR; INTRAVENOUS at 21:47

## 2019-05-09 RX ADMIN — ONDANSETRON 4 MG: 2 INJECTION INTRAMUSCULAR; INTRAVENOUS at 09:27

## 2019-05-09 RX ADMIN — ROCURONIUM BROMIDE 30 MG: 10 INJECTION INTRAVENOUS at 21:41

## 2019-05-09 RX ADMIN — PHENYLEPHRINE HYDROCHLORIDE 100 MCG: 10 INJECTION INTRAVENOUS at 21:59

## 2019-05-09 RX ADMIN — METOPROLOL TARTRATE 50 MG: 50 TABLET, FILM COATED ORAL at 23:30

## 2019-05-09 RX ADMIN — ONDANSETRON 4 MG: 2 INJECTION INTRAMUSCULAR; INTRAVENOUS at 17:12

## 2019-05-09 RX ADMIN — PROPOFOL 130 MG: 10 INJECTION, EMULSION INTRAVENOUS at 21:41

## 2019-05-09 RX ADMIN — METOCLOPRAMIDE 10 MG: 5 INJECTION, SOLUTION INTRAMUSCULAR; INTRAVENOUS at 11:44

## 2019-05-09 RX ADMIN — POTASSIUM CHLORIDE 20 MEQ: 29.8 INJECTION, SOLUTION INTRAVENOUS at 03:57

## 2019-05-09 RX ADMIN — LISINOPRIL 20 MG: 20 TABLET ORAL at 23:30

## 2019-05-09 RX ADMIN — CEFTRIAXONE SODIUM 1 G: 1 INJECTION, POWDER, FOR SOLUTION INTRAMUSCULAR; INTRAVENOUS at 11:46

## 2019-05-09 RX ADMIN — PHENYLEPHRINE HYDROCHLORIDE 100 MCG: 10 INJECTION INTRAVENOUS at 22:09

## 2019-05-09 RX ADMIN — DEXAMETHASONE SODIUM PHOSPHATE 8 MG: 4 INJECTION, SOLUTION INTRAMUSCULAR; INTRAVENOUS at 21:50

## 2019-05-09 RX ADMIN — Medication 10 ML: at 09:31

## 2019-05-09 RX ADMIN — INSULIN GLARGINE 20 UNITS: 100 INJECTION, SOLUTION SUBCUTANEOUS at 00:01

## 2019-05-09 RX ADMIN — METOCLOPRAMIDE 10 MG: 5 INJECTION, SOLUTION INTRAMUSCULAR; INTRAVENOUS at 04:01

## 2019-05-09 RX ADMIN — SODIUM CHLORIDE: 9 INJECTION, SOLUTION INTRAVENOUS at 11:44

## 2019-05-09 RX ADMIN — FENTANYL CITRATE 50 MCG: 50 INJECTION INTRAMUSCULAR; INTRAVENOUS at 21:41

## 2019-05-09 RX ADMIN — FAMOTIDINE 20 MG: 10 INJECTION, SOLUTION INTRAVENOUS at 23:30

## 2019-05-09 ASSESSMENT — PAIN SCALES - GENERAL
PAINLEVEL_OUTOF10: 0

## 2019-05-09 ASSESSMENT — PULMONARY FUNCTION TESTS
PIF_VALUE: 22
PIF_VALUE: 0
PIF_VALUE: 2
PIF_VALUE: 20
PIF_VALUE: 18
PIF_VALUE: 24
PIF_VALUE: 0
PIF_VALUE: 2
PIF_VALUE: 18
PIF_VALUE: 18
PIF_VALUE: 1
PIF_VALUE: 1
PIF_VALUE: 26
PIF_VALUE: 20
PIF_VALUE: 20
PIF_VALUE: 18
PIF_VALUE: 20
PIF_VALUE: 3
PIF_VALUE: 21
PIF_VALUE: 20
PIF_VALUE: 20
PIF_VALUE: 18
PIF_VALUE: 0
PIF_VALUE: 20
PIF_VALUE: 22
PIF_VALUE: 0
PIF_VALUE: 21
PIF_VALUE: 20
PIF_VALUE: 20
PIF_VALUE: 18
PIF_VALUE: 0
PIF_VALUE: 18
PIF_VALUE: 19
PIF_VALUE: 20
PIF_VALUE: 19
PIF_VALUE: 20
PIF_VALUE: 20
PIF_VALUE: 16
PIF_VALUE: 18
PIF_VALUE: 1
PIF_VALUE: 20
PIF_VALUE: 27
PIF_VALUE: 20
PIF_VALUE: 1
PIF_VALUE: 18
PIF_VALUE: 2
PIF_VALUE: 16
PIF_VALUE: 1
PIF_VALUE: 17
PIF_VALUE: 18
PIF_VALUE: 19
PIF_VALUE: 20
PIF_VALUE: 15
PIF_VALUE: 2
PIF_VALUE: 20
PIF_VALUE: 18
PIF_VALUE: 20
PIF_VALUE: 0
PIF_VALUE: 20
PIF_VALUE: 0
PIF_VALUE: 20

## 2019-05-09 NOTE — PROGRESS NOTES
[facility] NEUROSCIENCES 4A  Bedside Swallowing Evaluation      SLP Individual Minutes  Time In: 0639  Time Out: 7123  Minutes: 18  Timed Code Treatment Minutes: 0 Minutes       Date: 2019  Patient Name: Booker Garrison      CSN: [id number]   : 1945  (72 y.o.)  Gender: female   Referring Physician:  Caitlyn Fragoso MD  Diagnosis: Ureteral stone with hydronephrosis  Secondary Diagnosis:  Dysphagia   History of Present Illness/Injury: Pt presents with the above diagnosis; see physician H&P for further history. Pt  reports CVA approximately 2 years ago with history of dysphagia to follow. RN Ryan Renee reports pt with chronic cough, though increased coughing noted with intake of thin liquid via straw. ST to complete BSE to assess swallow function and determine least restrictive PO diet.    Past Medical History:   Diagnosis Date    Cerebral artery occlusion with cerebral infarction ([address]     Right sided weakness    Chronic kidney disease     Diabetes mellitus ([address]     Discitis     GERD (gastroesophageal reflux disease)     Dysphagia/GERD    Hypertension        Pain:  Back pain upon raising head of bed; pt only able to tolerate approximately 30 degree elevation     Current Diet: general with thin liquids     Respiratory Status: [x] Independent [] Nasal Cannula [] Oxygen Mask      [] Tracheostomy [] Other:     [] Ventilator/Settings:    Behavioral Observation: [x] Alert [] Oriented [] Confused [] Lethargic      [] Dysarthric [] Limited Direction Following [] Agitated      [] Other:    ORAL MECHANISM EVALUATION:         Comments:  Facial / Labial [x]WFL [] Impaired []DNT    Lingual []WFL [x] Impaired []DNT Limited ROM   Dentition [x]WFL [] Impaired []DNT    Velum [x]WFL [] Impaired []DNT    Vocal Quality [x]WFL [] Impaired []DNT    Sensation []WFL [] Impaired [x]DNT    Cough [x]WFL [] Impaired []DNT    Other: []WFL [] Impaired []DNT    Other: []WFL [] Impaired []DNT        PATIENT WAS EVALUATED USING:  Ice chips, thin liquids via cup, nectar thick liquids via cup and straw, puree, hard solid    ORAL PHASE: [] WFL  [x] Impaired   [] Loss of bolus from lips [] Impaired AP movement [] Pocketing Left   [] Pocketing Right  [] Lingual Pumping  [x] Impaired Mastication   [x] Reduced Bolus Formation [] Impaired Oral Initiation    [] OTHER:    PHARYNGEAL PHASE: [] WFL: Pharyngeal phase appears WFLs, but cannot completely rule out pharyngeal phase deficits from a bedside swallow evaluation alone. [x] Impaired   [x] Delayed Swallow  [x] Decreased Hyolaryngeal Elevation [] Audible Swallow   [x] Suspected Pharyngeal Residue due to spontaneous multiple swallow. [] OTHER:    SIGNS AND SYMPTOMS OF LARYNGEAL PENETRATION / ASPIRATION:  [] NO sign/symptoms of aspiration evident with this evaluation, but cannot rule out silent aspiration. [] Throat Clear  [x] Immediate Cough [x] Delayed Cough [] Wet Vocal Quality  [] Change in Pulmonary Status  [] OTHER:    IMPRESSIONS: Pt presents with moderate oropharyngeal dysphagia evidenced by the findings outlined above. Pt currently with significant back pain, limiting ability to tolerate optimal upright positioning for PO intake; pt currently able to tolerate approximately 30 degree incline of head of bed. Pt with overall increased level of fatigue, negatively impacting mastication of hard solids and effective bolus formation; pt requiring highly increased mastication time for effective breakdown of SMALL cracker. Delayed swallow with decreased hyolaryngeal elevation noted upon palpation. Pt with immediate cough following PO intake of thin liquid via cup and delayed cough following hard solid. Increased success with nectar thick liquids via cup, though delayed cough noted following straw drink of nectar thick liquids. No overt s/s of aspiration noted following soft solids and nectar thick liquids via cup drink.     Due to the above results, recommend diet downgrade to dysphagia II with nectar thick liquids (NO straws) to meet nutrition and hydration needs safely. RECOMMENDATIONS:     Modified Barium Swallow: [] Is indicated to further assess    [x] Is NOT indicated at this time; Will recommend as        appropriate. DIET RECOMMENDATIONS:  Dysphagia II with nectar thick liquids    STRATEGIES: [] Strategies pending MBS results. [x] Full upright position as tolerated [x] Small bite/sip [x] No Straw [] Multiple Swallow  [] Chin tuck [] Head turn [x] Pulmonary monitoring [] Oral care after all meals  [] Supervision  [] Medication in applesauce []Direct 1:1 Supervision  [] Spoon all liquids [] Alternate solid / liquid [] Limit distractions [x] Monitor for fatigue  [] PMV in place for all po [] OTHER:      EDUCATION:   Learner: [x]Patient [x] Significant other [] Son/Daughter [] Parent     [] Other:   Education: [x] Reviewed results and recommendations of this evaluation     [x] Reviewed diet and strategies     [x] Reviewed signs, symptoms and risk of aspiration     [] Demonstrated how to thick liquid appropriately. [x] Reviewed goals and Plan of Care     [] OTHER:   Method: [x] Discussion [x] Demonstration [] Hand-out     [] OTHER:   Evaluation of Education:     [x] Verbalizes understanding [x] Demonstrates with assistance     [] Demonstrates without assistance [x]Needs further instruction     [] No evidence of learning  [] Family not present    PATIENT GOALS: [] Pt did not state. Will further assess during treatment. [x] Return to the least restricted diet possible     [x] Return to previous level of function     [] OTHER:    PLAN / TREATMENT RECOMMENDATIONS:  [x] Skilled SLP intervention on acute care 3-5 x per week or until goals met and/or pt plateaus in function.   Specific interventions for next session may include: advanced trials     SHORT TERM GOALS:  Short-term Goals  Timeframe for Short-term Goals: 2 weeks  Goal 1: Pt will tolerate dysphagia II diet with nectar thick liquids (NO straws) without overt s/s of aspiration to meet nutrition and hydration needs safely. Goal 2: Pt will tolerate advanced PO trials without overt s/s of aspiration to advance to least restrictive PO diet. Goal 3: Pt will complete pharyngeal strengthening exercises x10-15 to improve swallow functional and safety with PO intake.        LONG TERM GOALS:  No LTGs due to short [address] - Student Intern   BE[employer] Lou

## 2019-05-09 NOTE — PROGRESS NOTES
Hospitalist Progress Note    Patient:  Blanquita Viveros      Unit/Bed:4A-08/008-A    YOB: 1945    MRN: [id number]       Acct: [de-identified]     PCP: Cheryl Delgado MD    Date of Admission: 5/8/2019    Assessment/Plan:    Ureteral stone with hydronephrosis on the right side: IV hydration, empiric antibiotics, urology on board planning for ureteroscopy. Nausea and vomiting secondary to ureteral colic    Hypokalemia: 2.8  prior to admission Replaced  Hypomagnesemia 1.3 on admission replaced    Possible dysphagia, swallowing evaluation ordered and also x-ray being done to evaluate for possible aspiration    Atelectasis on the right lower lobe, encourage incentive spirometry    Essential hypertension: Norvasc, lisinopril, metoprolol    Diabetes mellitus type 2, insulin-dependent, chronic kidney disease stage III: Lantus 22 times daily, sliding scale insulin  Lab Results   Component Value Date    LABA1C 6.6 (H) 04/04/2019     No results found for: EAG     Severe protein calorie malnutrition as per dietitian, encourage supplementation    Gastroesophageal reflux disease: PPI    History of stroke with right-sided hemiplegia, partial, bed bound: HOLD PLAVIX , will resume statin and continue blood pressure medications    Discitis, Proteus mirabilis, April 2019? Stop date antibiotics, 5/ 22??:  On Rocephin 2 g daily    Expected discharge date:  2 days    Disposition:    [] Home       [] TCU       [] Rehab       [] Psych       [x] SNF       [] [facility]       [] Other-    Chief Complaint:  Persistent nausea and vomiting and abdominal discomfort. Patient transferred from [facility] for further evaluation.   Patient was discharged recently on antibiotics for discitis and continued to have some nausea and vomiting almost on a daily basis but over the last 2 days has been having persistent nausea and vomiting and started having abdominal discomfort and was evaluated in the emergency room and diagnosed to have a 8 mm ureteral stone with hydronephrosis and was transferred here for urology consultation. Patient was recently here from 4/4-4/15/19, with chief complaint of 3 weeks nausea and vomiting and possible discitis at T8-9 and 6 weeks prior had Proteus bacteremia. Status post biopsy and aspiration of the thoracic spine at T9-T10 done on 4/8/19 and Proteus mirabilis was positive and was discharged home on ceftriaxone     Hospital Course: Patient admitted to the hospital and started on gentle hydration and antibiotics were resumed and infectious disease was consulted along with urology. Electrolytes were low with potassium of 2.8 and magnesium 1.3 and were replaced. Patient has been having some cough after eating and there is concern for aspiration and swallowing evaluation was ordered. Chest x-ray done to evaluate for possible right-sided effusion versus pneumonia. CT scan suggests more of atelectasis. Urology planning for ureteroscopy today, hemodynamically stable. Discussed in person with urology with infectious disease    Subjective (past 24 hours):      Patient continues to have some pain in the abdomen anteriorly on palpation and also in the right costovertebral angle .     Medications:  Reviewed    Infusion Medications    sodium chloride 75 mL/hr at 05/09/19 1144    dextrose       Scheduled Medications    magnesium replacement protocol   Other RX Placeholder    potassium (CARDIAC) replacement protocol   Other RX Placeholder    cefTRIAXone (ROCEPHIN) IV  1 g Intravenous Q24H    amLODIPine  5 mg Oral Daily    citalopram  10 mg Oral Daily    lisinopril  20 mg Oral BID    metoprolol tartrate  50 mg Oral BID    insulin glargine  20 Units Subcutaneous BID    sodium chloride flush  10 mL Intravenous 2 times per day    enoxaparin  40 mg Subcutaneous Daily    insulin lispro  0-6 Units Subcutaneous TID WC    insulin lispro  0-3 Units Subcutaneous Nightly PRN Meds: promethazine, ondansetron, acetaminophen, sodium chloride flush, magnesium hydroxide, metoclopramide, acetaminophen, glucose, dextrose, glucagon (rDNA), dextrose      Intake/Output Summary (Last 24 hours) at 5/9/2019 1154  Last data filed at 5/9/2019 0450  Gross per 24 hour   Intake 685.59 ml   Output 0 ml   Net 685.59 ml       Diet:  Diet NPO, After Midnight    Exam:  BP (!) 145/72   Pulse 93   Temp 97.9 °F (36.6 °C) (Oral)   Resp 18   Ht 5' 3\" (1.6 m)   Wt 177 lb 6.4 oz (80.5 kg)   LMP  (LMP Unknown) Comment: post menopase  SpO2 93%   BMI 31.42 kg/m²     Physical Examination:   General appearance - alert, awake  and in mild distress from, nausea and vominting  HEENT: Normocephalic, Atraumatic, pupils reactive, mucous membranes moist  Chest - moving equally to respiration,symmetric air entry, clear to auscultation  Heart - normal rate, regular rhythm, normal S1, S2, no murmurs  Abdomen - soft, tender in middle and right CVA tenderness+ distended, no masses or organomegaly, BS+  Neurological - alert, oriented, slow  normal speech, sensations intact and  unable to move extremities much except the left upper except on the right side upper 2-3/5, poor hand ,  Lower extremities left slight wiggle, right lower 0/5  Extremities - peripheral pulses normal, +1 pedal edema,  Capillary refill less than 3 sec  Skin - normal coloration and turgor, no rashes        Labs:   Recent Labs     05/08/19 2248 05/09/19  0558   WBC 16.7* 14.6*   HGB 9.7* 9.1*   HCT 31.7* 29.1*    326     Recent Labs     05/08/19 2000 05/09/19  0212 05/09/19  0558     --  143   K 3.3* 3.9 4.0     --  103   CO2 24  --  25   BUN 7  --  7   CREATININE 0.9  --  0.8   CALCIUM 8.5  --  8.5     No results for input(s): AST, ALT, BILIDIR, BILITOT, ALKPHOS in the last 72 hours. No results for input(s): INR in the last 72 hours. No results for input(s): Eyvonne Ape in the last 72 hours.     Microbiology:

## 2019-05-09 NOTE — PROGRESS NOTES
Patient had occasional non productive cough. Gave patient a sip of water before administering medications and pt had a delayed cough and then it became a frequent cough. Pills not administered at this time due to coughing, ? Swallow, and patient also refused pills at this time.   Dr. Chris Thomson witnessed coughing upon coming in to assess patient and ordered CXR and ST eval.

## 2019-05-09 NOTE — PROGRESS NOTES
Nutrition Assessment    Type and Reason for Visit: Initial, Positive Nutrition Screen(poor appetite, unplanned weight loss, swallowing trouble, nausea, vomiting)    Nutrition Recommendations:   Diet initiation when able, plan ONS start then. Consider a multivitamin if pt able to tolerate, note recent nausea/vomiting. Nutrition Assessment:   Pt. severely malnourished AEB report of very poor oral intake in the last 2 weeks, moderate subcutaneous fat loss, and moderate muscle loss . At risk for further nutritional compromise r/t NPO status, continued poor appetite, intermittent nausea/vomiting, and need for nutrition support. Will recommend diet initiation when able after urology procedure. Plan ONS start when diet full liquids or greater. Encouraged oral intake to assist with healing/recovery. Will recommend consider a multivitamin if pt able to tolerate. Malnutrition Assessment:  · Malnutrition Status: Meets the criteria for severe malnutrition  · Context: Acute illness or injury  · Findings of the 6 clinical characteristics of malnutrition (Minimum of 2 out of 6 clinical characteristics is required to make the diagnosis of moderate or severe Protein Calorie Malnutrition based on AND/ASPEN Guidelines):  1. Energy Intake-Less than or equal to 50% of estimated energy requirement, Greater than or equal to 5 days    2. Weight Loss-(Potential 17.1% loss in the last month however question accuracy of bedscale weights),    3. Fat Loss-Moderate subcutaneous fat loss, Orbital  4.  Muscle Loss-Moderate muscle mass loss, Temples (temporalis muscle)    Nutrition Risk Level: High    Nutrient Needs:  · Estimated Daily Total Kcal: 1897-2202 kcals (15-18 kcals/kg/day based on 81 kg)   · Estimated Daily Protein (g): 73 grams (1.4 grams/kg/day based on 52 kg IBW)    Nutrition Diagnosis:   · Problem: Severe malnutrition, In context of acute illness or injury  · Etiology: related to Insufficient energy/nutrient consumption, Nausea, Vomiting     Signs and symptoms:  as evidenced by Diet history of poor intake, Severe loss of subcutaneous fat, Severe muscle loss    Objective Information:  · Nutrition-Focused Physical Findings: Overweight. Hx: CVA, DB.  4/4/19: HgbA1c 6.6% . Intermittent nausea, vomiting, worsened in the last 2 weeks, with very minimal oral intake. Medications: humalog, reglan, lantus, phenergan, zofran. Denies swalllowing trouble, \"goes down fine, just comes back up when hits my stomach sometimes\". Skin/wound issues, wound RN to see. Planning cystoscopy, possible right ureteroscopy, possible right ureterorenoscopy, possible laser lithotripsy, possible basket retrieval of stone fragments, possible right ureteral stent placement by Dr. Valerie Florentino 5/9/2019   · Wound Type: (stage I coccyx, multiple red/rash areas-groin, farida-area, under breast, right arm)  · Current Nutrition Therapies:  · Oral Diet Orders: NPO   · Oral Nutrition Supplement (ONS) Orders: (Plan ONS start when diet advanced.)  · Anthropometric Measures:  · Ht: 5' 3\" (160 cm)   · Current Body Wt: 177 lb 6.4 oz (80.5 kg)(5/9/19: +1 UE, +2 LE edema bedscale)  · Admission Body Wt: 182 lb 8 oz (82.8 kg)(5/8/19 +1 UE, +2 LE edema bedscale)  · Usual Body Wt: (~ 220# per . Per EMR: 4/4/19: 213#14.4oz (no edema))  · % Weight Change:  ,  17.1% in the 1 month  however question accuracy of bedscale weights (note  questioned bedscale weight 4/4/19, he thought weight was lower)  · Ideal Body Wt: 115 lb (52.2 kg),   · BMI Classification: BMI 30.0 - 34.9 Obese Class I    Nutrition Interventions:   (When diet initiated full liquids or greater plan ONS start.)  Continued Inpatient Monitoring, Education Initiated, Coordination of Care(Discussed the need for good nutrition when diet initiated. )    Nutrition Evaluation:   · Evaluation: Goals set   · Goals: Pt will receive adequate nutrition in 1-4 days.     · Monitoring: Nutrition Progression, Wound Healing, Weight, Pertinent Labs, Chewing/Swallowing, Nausea or Vomiting, Monitor Bowel Function      Electronically signed by Guillermo Thompson RD, LD on 5/9/19 at 11:12 AM    Contact Number: [phone]

## 2019-05-09 NOTE — CONSULTS
Consults                                  CONSULTATION NOTE :ID       Patient - Sonja Phipps,  Age - 72 y.o.    - 1945      Room Number - 4A-08/008-A   N -  [id number]   [facility] # - [de-identified]  Date of Admission -  2019  6:44 PM  Patient's PCP: Jennifer Nunes MD     Requesting Physician: Alma Bautista MD    REASON FOR CONSULTATION   Follow-up for discitis. HISTORY OF PRESENT ILLNESS       This is a very pleasant 72 y.o. female who was admitted to the hospital with a chief complaints of nausea and vomiting. She has been having persistent nausea and vomiting for which reason she was seen at outside hospital and transferred here. She was noted to have a stone on her right ureter and was put felt may be contributing to the nausea and vomiting. She was recently seen at this hospital and has been on treatment for discitis of her her lower thoracic spine due to Proteus infection. She was treated in the past for urinary tract infection due to Proteus and it was felt that it seeded into her spine. She has been on IV ceftriaxone for the last 3 weeks. Patient did not come for her follow-up visit. She is at home, she is bedbound due to an old stroke and her  is her caretaker. She has been coughing every time she eats and drinks there is concern for possible aspiration. She denies any fever or chills, she has shortness of breath, she has no urinary complaints.     PAST MEDICAL  HISTORY       Past Medical History:   Diagnosis Date    Cerebral artery occlusion with cerebral infarction ([address]     Right sided weakness    Chronic kidney disease     Diabetes mellitus ([address]     Discitis     GERD (gastroesophageal reflux disease)     Dysphagia/GERD    Hypertension        PAST SURGICAL HISTORY     Past Surgical History:   Procedure Laterality Date    HYSTERECTOMY           MEDICATIONS:       Scheduled Meds:   magnesium replacement protocol   Other RX Placeholder    potassium (CARDIAC) replacement protocol   Other RX Placeholder    cefTRIAXone (ROCEPHIN) IV  1 g Intravenous Q24H    amLODIPine  5 mg Oral Daily    citalopram  10 mg Oral Daily    lisinopril  20 mg Oral BID    metoprolol tartrate  50 mg Oral BID    insulin glargine  20 Units Subcutaneous BID    sodium chloride flush  10 mL Intravenous 2 times per day    enoxaparin  40 mg Subcutaneous Daily    insulin lispro  0-6 Units Subcutaneous TID WC    insulin lispro  0-3 Units Subcutaneous Nightly     Continuous Infusions:   sodium chloride 75 mL/hr at 05/08/19 2222    dextrose       PRN Meds:promethazine, ondansetron, acetaminophen, sodium chloride flush, magnesium hydroxide, metoclopramide, acetaminophen, glucose, dextrose, glucagon (rDNA), dextrose  Allergies:   ALLERGIES:    Patient has no known allergies. SOCIAL HISTORY:     TOBACCO:   reports that she has never smoked. She has never used smokeless tobacco.     ETOH:   reports that she does not drink alcohol. Patient currently lives with family       FAMILY HISTORY:     History reviewed. No pertinent family history. REVIEW OF SYSTEMS:     Constitutional: no fever, no night sweats, + fatigue. Head: no head ache , no head injury, no migranes. Eye: no blurring of vision, no double vision.   Ears: no hearing difficulty, no tinnitus  Mouth/throat: no ulceration, dental caries , dysphagia  Lungs:+ cough no chest pain, she continues to cough after eating food  CVS: no palpitation, no chest pain, no shortness of breath  GI: no abdominal pain, no nausea , no vomiting, no constipation  AMADA: no dysuria, frequency and urgency, no hematuria, no kidney stones  Musculoskeletal: no joint pain, +swelling , +stiffness,  Endocrine: no polyuria, polydipsia, no cold or heat intolerance  Hematology: no anemia, no easy brusing or bleeding, no hx of clotting disorder  Dermatology: no skin rash, no eczema, no pruritis,      PHYSICAL EXAM:     /67   Pulse 91   Temp 98.1 °F (36.7 °C) (Oral)   Resp 16   Ht 5' 3\" (1.6 m)   Wt 177 lb 6.4 oz (80.5 kg)   LMP  (LMP Unknown) Comment: post menopase  SpO2 93%   BMI 31.42 kg/m²   General:  Awake, alert, chronically sick-looking. HEENT: Slightly pale conjunctiva, unicteric sclera, moist oral mucosa. Chest:  She has diminished breath sound with crackles on the right lower lung field. The air entry at the base was almost not heard  Cardiovascular:  RRR ,S1S2, no murmur or gallop. Abdomen:  Soft, non tender to palpation. Extremities:  Contracted right extremity  Skin:  Warm and dry. CNS: oriented, old right side weakness        LABS:     CBC:   Recent Labs     05/08/19  2248 05/09/19  0558   WBC 16.7* 14.6*   HGB 9.7* 9.1*    326     BMP:    Recent Labs     05/08/19  2000 05/09/19  0212 05/09/19  0558     --  143   K 3.3* 3.9 4.0     --  103   CO2 24  --  25   BUN 7  --  7   CREATININE 0.9  --  0.8   GLUCOSE 142*  --  138*     Calcium:  Recent Labs     05/09/19  0558   CALCIUM 8.5     Ionized Calcium:No results for input(s): IONCA in the last 72 hours. Magnesium:  Recent Labs     05/09/19  0558   MG 2.2     Phosphorus:No results for input(s): PHOS in the last 72 hours. BNP:No results for input(s): BNP in the last 72 hours.   Glucose:  Recent Labs     05/09/19  0001 05/09/19  0842   POCGLU 130* 116*       Problem list of patient      Patient Active Problem List   Diagnosis Code    Osteomyelitis [address] M86.9    Acute cystitis without hematuria N30.00    Type 2 diabetes mellitus with other specified complication ([address] C76.52    Essential hypertension I10    Severe protein-calorie malnutrition ([address] E43    Examination for normal comparison for clinical research Z00.6    Discitis M46.40    Oliguria R34    Morbid obesity ([address] E66.01    History of CVA (cerebrovascular accident) Z86.73    Hypoglycemia E16.2    Loose stools R19.5    Hyponatremia E87.1    Hypokalemia E87.6    Moderate malnutrition (HCC) E44.0    Urinary retention R33.9    Recurrent UTI N39.0    Nausea and vomiting R11.2    Pleural effusion on right J90    Hypomagnesemia E83.42    Bilateral atelectasis J98.11    Normocytic anemia D64.9    Generalized weakness R53.1    Ureteral stone N20.1           Impression and Recommendation:   Nausea and vomiting   Right ureteric stone  Cough after eating: concern for aspiration  Right side pneumonia ? Effusion. Will do CXR  discites due to proteus. She will continue iv ceftriaxone. Plan was for 6 wks  Hx of UTI  Thank you Alma Bautista MD for allowing me to participate in this patient's care.     Efe Booth MD,FACP 5/9/2019 10:20 AM

## 2019-05-09 NOTE — CARE COORDINATION
DISCHARGE BARRIERS  5/9/19, 11:56 AM    Reason for Referral: Current with HH  Mental Status: Cognitively Impaired. Spouse Gino answered all questions. Decision Making: Spouse makes all decisions. Family/Social/Home Environment: Lives at home with her spouse. He provides most of her care. His sister Eli Gordon comes over 2 nights a week and on Saturdays to give him a break. Current Services: [facility]- verified with Rahel at the agency that she is current with nursing only. Current Equipment: Wheelchair, ramp, criss lift, hospital bed  Payment Source:[employer] Medicare  Concerns or Barriers to Discharge: None  Collabrative List of ECF/HH were provided: Current with HH    Teach Back Method used with Claudia Avila regarding care plan and discharge planning. Patient verbalize understanding of the plan of care and contribute to goal setting. Anticipated Needs/Discharge Plan: Claudia Avila will return home with 24 hour care from her spouse with her sister-in-law Eli Gordon also helping at times. They will resume services with [facility] for nursing only. He denies other needs at this time. Electronically signed by DOUG Christy on 5/9/2019 at 11:56 AM

## 2019-05-09 NOTE — PROCEDURES
A Bladder scan was performed at 2337 . The patient's last void was at patient has not voided . The residual amount was measured to be 68 ML. Report of results was given to [facility].

## 2019-05-09 NOTE — H&P
TABS tablet, Take 1 tablet by mouth daily  citalopram (CELEXA) 10 MG tablet, Take 10 mg by mouth daily  lisinopril (PRINIVIL;ZESTRIL) 20 MG tablet, Take 20 mg by mouth 2 times daily  magnesium 30 MG tablet, Take 250 mg by mouth daily  ranitidine (ZANTAC) 150 MG capsule, Take 150 mg by mouth 2 times daily  docusate sodium (COLACE) 100 MG capsule, Take 100 mg by mouth daily  acetaminophen (TYLENOL) 500 MG tablet, Take 2 tablets by mouth every 6 hours as needed for Pain  ondansetron (ZOFRAN) 4 MG tablet, Take 1 tablet by mouth daily as needed for Nausea or Vomiting    Allergies:    Patient has no known allergies. Social History:    reports that she has never smoked. She has never used smokeless tobacco. She reports that she does not drink alcohol or use drugs. Family History:   family history is not on file. REVIEW OF SYSTEMS:  See HPI and problem list; otherwise no other new complaints with respect to HEENT, neck, pulmonary, coronary, GI, , endocrine, musculoskeletal, immune system/connective tissue disease, hematologic, neuropsych, skin, lymphatics, or malignancies. PHYSICAL EXAM:  Vitals:  /83   Pulse 103   Temp 98.6 °F (37 °C) (Oral)   Resp 18   Ht 5' 3\" (1.6 m)   Wt 182 lb 8 oz (82.8 kg)   LMP  (LMP Unknown) Comment: post menopase  SpO2 93%   BMI 32.33 kg/m²     HEENT: MM dry, PERRLA, EMOI, Normocephalic and Atraumatic  Neck: Supple, Carotid Pulses Present, No Bruits, No Masses, Tenderness, Nodularity and No Lymphadenopathy  Chest/Lungs: Clear to Auscultation without Rales, Rhonchi, or Wheezes  Cardiac: Regular Rate and Rhythm without Rubs, Clicks, Gallops, or Murmurs  GI/Abdomen:  Bowel Sounds Present, Soft, Non-tender, without Guarding or Rebound Tenderness, No Masses and No Tenderness  : Not examined  EXT/Skin: No Edema, No Cyanosis, No Clubbing and Normal Skin Turgor  Neuro: at baseline         LABS:    Recent Results (from the past 24 hour(s))   Basic Metabolic Panel 70 - 108 mg/dl   Potassium    Collection Time: 05/09/19  2:12 AM   Result Value Ref Range    Potassium 3.9 3.5 - 5.2 meq/L         ASSESSMENT:      Patient Active Problem List   Diagnosis    Osteomyelitis (HCC)    Acute cystitis without hematuria    Type 2 diabetes mellitus with other specified complication [facility]    Essential hypertension    Severe protein-calorie malnutrition ([facility]    Examination for normal comparison for clinical research    Discitis    Oliguria    Morbid obesity ([facility]    History of CVA (cerebrovascular accident)    Hypoglycemia    Loose stools    Hyponatremia    Hypokalemia    Moderate malnutrition (HCC)    Urinary retention    Recurrent UTI    Nausea and vomiting    Pleural effusion on right    Hypomagnesemia    Bilateral atelectasis    Normocytic anemia    Generalized weakness    Ureteral stone       PLAN:    1. Ureteral stone with hydronephrosis - pain control, IV fluids, urology consult  2. Hypokalemia - replacement protocol  3. Hypomagnesemia - replacement protocol  4.  Recent history of thoracic spine osteomyelitis/discitis  - continue Rocephin 2 grams daily prior to admission    Home medications reviewed  See orders     Note that over 50 minutes was spent in evaluation of the patient, review of the chart and pertinent records, discussion with family/staff, etc    [employer]  PA-C  10:08 PM  5/8/2019

## 2019-05-09 NOTE — PLAN OF CARE
Problem: Falls - Risk of:  Goal: Will remain free from falls  Description  Will remain free from falls  5/9/2019 1510 by Altaf Patrick RN  Outcome: Ongoing  Note:   Call light within reach, bed in lowest position, non skid footwear on, room door open, bed alarm on . Pt spouse at bedside and using call light for patient appropriately. Problem: Risk for Impaired Skin Integrity  Goal: Tissue integrity - skin and mucous membranes  Description  Structural intactness and normal physiological function of skin and  mucous membranes. 5/9/2019 1510 by Altaf Patrick RN  Outcome: Ongoing  Note:   Patient being turned every 2 hours and prn. Pt has interday under all skin fold/excoriated areas. External cath in place for incontinence. Spouse said that rash/ redness was almost gone 2 days ago. Problem: Nausea/Vomiting:  Goal: Absence of nausea/vomiting  Description  Absence of nausea/vomiting  5/9/2019 1510 by Altaf Patrick RN  Outcome: Ongoing  Note:   Pt continues to have nausea. Prn antiemetics administered. Problem: DISCHARGE BARRIERS  Goal: Patient's continuum of care needs are met  5/9/2019 1510 by Altaf Patrick RN  Outcome: Ongoing  Note:   Pt plans to return home with spouse,  following for discharge needs. Problem:   Goal: No urinary complication  Outcome: Ongoing  Note:   Patient having cystoscopy later today. Urinalysis sent earlier - urine yellow/milky in color and cloudy. Dr. Robina Sung aware of results. Urology following. Care plan reviewed with patient. Patient verbalize understanding of the plan of care and contribute to goal setting.

## 2019-05-09 NOTE — PLAN OF CARE
Problem: Nutrition  Goal: Optimal nutrition therapy  Outcome: Ongoing   Nutrition Problem: Severe malnutrition, In context of acute illness or injury  Intervention: Food and/or Nutrient Delivery: (When diet initiated full liquids or greater plan ONS start.)  Nutritional Goals: Pt will receive adequate nutrition in 1-4 days.

## 2019-05-09 NOTE — CONSULTS
Urology Consult Note      Reason for Consult:  \"8mm R ureteral stone\"  Requesting Physician:  Dr. Sylvia Gorman From:  patient, spouse, electronic medical record, nurse    Chief Complaint: Nausea and vomiting    HISTORY OF PRESENT ILLNESS:                The patient is a 72 y.o. female with significant past medical history of stroke with R sided weakness, bacteremia and discitis/osteomyelitis of the thoracic spine--biopsy grew proteus on IV antibiotic therapy outpatient, recurrent UTIs who presented to [facility] secondary to R sided back pain and recurrent nausea and vomiting. Pt's  reports she has been having right sided back pain for 2 weeks time and then started with nausea and vomiting that has been every 15 minutes at time. CT at outside facility reportedly shows a right sided 8 mm proximal ureteral stone with hydronephrosis. Images and report not available for review at this time. [facility] is pushing them through PACs system. WBC 16.7 on adm down to 14.6 today. Creatinine 0.8. Pt reports nausea. No pain currently. No chest pain, sob.  reports she normally urinates on her own. Denies hx of incontinence or urinary retention. Pt has an external catheter in place at this time.       Past Medical History:        Diagnosis Date    Cerebral artery occlusion with cerebral infarction ([facility]     Right sided weakness    Chronic kidney disease     Diabetes mellitus (HCC)     Discitis     GERD (gastroesophageal reflux disease)     Dysphagia/GERD    Hypertension      Past Surgical History:        Procedure Laterality Date    HYSTERECTOMY         Social History     Socioeconomic History    Marital status:      Spouse name: Not on file    Number of children: Not on file    Years of education: Not on file    Highest education level: Not on file   Occupational History    Not on file   Social Needs    Financial resource strain: Not on file    Food insecurity: °F (37 °C) (Oral)   Resp 16   Ht 5' 3\" (1.6 m)   Wt 177 lb 6.4 oz (80.5 kg)   LMP  (LMP Unknown) Comment: post menopase  SpO2 93%   BMI 31.42 kg/m² . Nursing note and vitals reviewed. Constitutional:    Alert and oriented to person, place, time, no acute distress and cooperative to examination with appropriate mood and affect. HEENT:   Head:         Normocephalic and atraumatic. Mouth/Throat:          Mucous membranes are normal.   Eyes:         EOM are normal. No scleral icterus. Nose:    The external appearance of the nose is normal  Ears: The ears appear normal to external inspection   Neck:         Supple, symmetrical, trachea midline, no adenopathy, thyroid symmetric, not enlarged and no tenderness. Cardiovascular:        Normal rate, regular rhythm, S1 S2 heart sounds. Pulmonary/Chest:       Lungs diminished throughout  Abdominal:          Soft. No tenderness. Active bowel sounds. Genitalia: Pt has redness and skin breakdown to labia and groin. Musculoskeletal:    Normal range of motion. She exhibits no edema or tenderness of lower extremities. Extremities:    Bilateral lower extremity edema  Neurological:    Alert and oriented.  R sided weakness, bilateral lower extremity weakness R >L  DATA:  CBC:   Lab Results   Component Value Date    WBC 14.6 05/09/2019    RBC 3.52 05/09/2019    HGB 9.1 05/09/2019    HCT 29.1 05/09/2019    MCV 82.7 05/09/2019    MCH 25.9 05/09/2019    MCHC 31.3 05/09/2019     05/09/2019    MPV 9.8 05/09/2019     BMP:    Lab Results   Component Value Date     05/09/2019    K 4.0 05/09/2019     05/09/2019    CO2 25 05/09/2019    BUN 7 05/09/2019    CREATININE 0.8 05/09/2019    CALCIUM 8.5 05/09/2019    LABGLOM 71 05/09/2019    GLUCOSE 138 05/09/2019     BUN/Creatinine:    Lab Results   Component Value Date    BUN 7 05/09/2019    CREATININE 0.8 05/09/2019     Magnesium:    Lab Results   Component Value Date    MG 2.2 05/09/2019     Phosphorus: No results found for: PHOS  PT/INR:    Lab Results   Component Value Date    INR 1.49 04/08/2019     U/A:    Lab Results   Component Value Date    COLORU YELLOW 04/05/2019    PHUR 6.5 04/05/2019    LEUKOCYTESUR NEGATIVE 04/05/2019    UROBILINOGEN 1.0 04/05/2019    BILIRUBINUR NEGATIVE 04/05/2019    BLOODU NEGATIVE 04/05/2019    GLUCOSEU NEGATIVE 04/05/2019       Imaging:   Awaiting imaging from [facility] for review. Prior CT from 4/2/19 with R proximal stone ~4 -5 mm stones and small nonobstructive R renal stone. Also notes pleural effusion. IMPRESSION:   R Ureteral calculus  R back pain  Nausea and vomiting  Recent pleural effusion  Recent Bacteremia  Recent diskitis/om--Proteus  Hx of CVA with R sided weakness--on Plavix    Plan:      [facility] and [address] [place] [address] radiology and images to be pushed through PACs system. Nursing to request reports from CXR and CT. Prior CT from 4/2/19 with a proximal R ureteral/renal pelvis stone ~4-5 mm in size and a R nonobstructive renal calculus. Discussed with pt and spouse at bedside. Pending review of current CT planning probable ureteroscopic treatment of R ureteral stone given symptoms of pain, infection with Proteus, and intractable n/v.      I described the procedure in detail and also described the associated risks and benefits at length. We discussed possible alternative therapies. We discussed the risks and benefits of not undergoing therapy. Patient understands these risks and benefits and desires to proceed. Keep pt NPO. Obtain Consent. Pt will be scheduled for Cystoscopy, possible right ureteroscopy, possible right ureterorenoscopy, possible laser lithotripsy, possible basket retrieval of stone fragments, possible right ureteral stent placement by Dr. Sander Lara 5/9/2019     Check CXR prior--already ordered by ID. Pt having some coughing after taking meds. Recent effusion.     Discussed with pt, spouse, Londell Lundborg RN, Dr. Shania Fleming, and  No. Thank you for including us in the care of Santana De Leon, APRN-CNP  05/09/19 8:35 AM  Urology    _____________________________________________________________________    I have seen and examined the patient independently on 05/09/2019 and I have reviewed all radiology reports and films along with pertinent laboratory values. I agree with the above assessment and plan with the following additions:      Review of Systems  No problems with ears, nose or throat. No problems with eyes. No chest pain, shortness of breath, abdominal pain, extremity pain or weakness, and no neurological deficits. No rashes. No swollen glands or lymph nodes.  symptoms per HPI. The remainder of the review of symptoms is negative. Exam  General: alert and oriented. Cooperative. HENT: Normocephalic, Atraumatic. Eyes: No scleral icterus, mucous membranes moist.  Respiratory: Effort normal.   Skin: No rashes or obvious lesions. Radiology  The patient has had a CT scan that I have reviewed along with its accompanying report. The study demonstrates a stone in the proximal right ureter with additional stones in the right kidney      Plan:  My plan will be to schedule cytoscopy with right ureteroscopy and right ureterorenoscopy, possible laser lithotripsy, possible basket retrieval of stone fragments and possible placement of a ureteral stent. I described the procedure in detail and also described the associated risks and benefits at length. We discussed possible alternative therapies. We discussed the risks and benefits of not undergoing therapy. Abbi López understands these risks and benefits and desires to proceed. She will be scheduled for the procedure later today. Thank you for the opportunity of allowing us to participate in Verenice's care.       Juan Kirk    [phone]

## 2019-05-09 NOTE — PROGRESS NOTES
Straight cath patient, per order, to obtain specimen. Patient is incontinent and unable to obtain an accurate urine specimen without a cath. Patient tolerated well, spouse at bedside. Yellow, cloudy almost a \"milky appearance\" returned. 75ml drained and sent to lab. external cath applied. EPC applied to buttocks, buttock/groin/farida area excoriated and reddened.

## 2019-05-09 NOTE — PLAN OF CARE
Problem: Falls - Risk of:  Goal: Will remain free from falls  Description  Will remain free from falls  Outcome: Ongoing  Note:   Fall precautions in place. Fall band, non skid socks, and bed alarm on. Call light in place. Problem: Falls - Risk of:  Goal: Absence of physical injury  Description  Absence of physical injury  Outcome: Ongoing  Note:   Absence of physical injury related to falls. Problem: Risk for Impaired Skin Integrity  Goal: Tissue integrity - skin and mucous membranes  Description  Structural intactness and normal physiological function of skin and  mucous membranes. Outcome: Ongoing  Note:   Patient has redness to coccyx, perineal area, inner thighs, under breasts, and under right arm. Wound consult placed for today. Q2 turns. Interdry placed under breasts, groin and abdominal folds. Barrier cream to coccyx      Problem: Nausea/Vomiting:  Goal: Absence of nausea/vomiting  Description  Absence of nausea/vomiting  Outcome: Ongoing  Note:   Patient was nauseous at the beginning of the shift and then it resolved. Patient vomited very small amount of green vomit and received zofran for that around 0215. Problem: Nausea/Vomiting:  Goal: Able to drink  Description  Able to drink  Outcome: Ongoing  Note:   Pt npo        Problem: Nausea/Vomiting:  Goal: Able to eat  Description  Able to eat  Outcome: Ongoing  Note:   Pt npo     Care plan reviewed with patient.  Will review and discuss care plan with family when available

## 2019-05-09 NOTE — CARE COORDINATION
5/9/19, 9:47 AM      Ronald Wagner       Admitted from: From [facility] ED 5/8/2019/ [address] day: 1   Location: [facility]08/008- Reason for admit: Ureteral stone [N20.1] Status: IP  Admit order signed?: yes  PMH:  has a past medical history of Cerebral artery occlusion with cerebral infarction ([facility] [address] Chronic kidney disease, Diabetes mellitus ([facility] [address] Discitis, GERD (gastroesophageal reflux disease), and Hypertension. Procedure: none  Pertinent abnormal Imaging:none  Medications:  Scheduled Meds:   magnesium replacement protocol   Other RX Placeholder    potassium (CARDIAC) replacement protocol   Other RX Placeholder    cefTRIAXone (ROCEPHIN) IV  1 g Intravenous Q24H    amLODIPine  5 mg Oral Daily    citalopram  10 mg Oral Daily    lisinopril  20 mg Oral BID    metoprolol tartrate  50 mg Oral BID    insulin glargine  20 Units Subcutaneous BID    sodium chloride flush  10 mL Intravenous 2 times per day    enoxaparin  40 mg Subcutaneous Daily    insulin lispro  0-6 Units Subcutaneous TID WC    insulin lispro  0-3 Units Subcutaneous Nightly     Continuous Infusions:   sodium chloride 75 mL/hr at 05/08/19 2222    dextrose        Pertinent Info/Orders/Treatment Plan:  WBC 14.6, IV fluids, diabetes management, IV antibiotics, potassium and magnesium replacement protocol, telemetry, Urology consult, ID consult. Diet: Diet NPO, After Midnight   Smoking status:  reports that she has never smoked. She has never used smokeless tobacco.   PCP: Jamey Euceda MD  Readmission: Patient has been readmitted within 8 days. Patient went to f/u appointment? Yes, current HH  If yes, was it within 7 days? Yes current [facility]  Patient was able to fill prescriptions?  Yes [facility]  Patient is taking medications as prescribed? yes  Cause for readmission? ureteral stone  Readmission Risk Score: 16%    Discharge Planning  Current Residence:  Private Residence  Living Arrangements:  Spouse/Significant Other   Support Systems:  Spouse/Significant Other  Current Services PTA:     Potential Assistance Needed:  Extended Care Facility, Home Care  Potential Assistance Purchasing Medications:  No  Does patient want to participate in local refill/ meds to beds program?  Yes  Type of Home Care Services:  None  Patient expects to be discharged to:  unsure  Expected Discharge date:  05/11/19  Follow Up Appointment: Best Day/ Time: Monday AM    Discharge Plan: Met with Ankur Douglas and her  Pelon Santiago. Ankur Douglas currently lives at home with her . Plan is to return at discharge. She is current with [facility] for nursing. She currently has a PICC line in and is receiving IV Rocephin daily for discitis. She is being followed by Dr. Alex Eckert, infectious disease physician for this. She has completed 3 of the 6 weeks of antibiotic therapy. She is basically wheelchair bound at home and Pelon Santiago states he has all the appropriate equipment. Denies any need for DME and will continue [facility] at discharge. Audra ESPINOZA updated. [facility] was updated as well.      Evaluation: yes

## 2019-05-09 NOTE — PROGRESS NOTES
Attempted to see patient to complete consult for moisture areas to skin folds of the groin, breasts and underarms. Patient also with prior to hospitalization skin breakdown to the coccyx. Volunteer currently working with patient. Spoke with primary nurse and she states patient came with stage 1 to buttocks/coccyx but redness is more from moisture due to incontinence. Skin folds with ITD redness per nursing and using interdry. Instructed to continue zinc barrier cream to buttock and moisture wicking chux. Continue to use the external female catheter and avoid depends. Off load using [place] Dream air mattress and waffle cushion to chair. Also suggested using the interdry for a few days to the skin folds and if it doesn't improve switch to antifungal powder and pt may need oral antifungal.  Will sign off and staff to reconsult if needed.

## 2019-05-10 LAB
ANION GAP SERPL CALCULATED.3IONS-SCNC: 10 MEQ/L (ref 8–16)
BASOPHILS # BLD: 0.1 %
BASOPHILS ABSOLUTE: 0 THOU/MM3 (ref 0–0.1)
BUN BLDV-MCNC: 7 MG/DL (ref 7–22)
CALCIUM SERPL-MCNC: 8 MG/DL (ref 8.5–10.5)
CHLORIDE BLD-SCNC: 109 MEQ/L (ref 98–111)
CO2: 24 MEQ/L (ref 23–33)
CREAT SERPL-MCNC: 0.6 MG/DL (ref 0.4–1.2)
EOSINOPHIL # BLD: 0 %
EOSINOPHILS ABSOLUTE: 0 THOU/MM3 (ref 0–0.4)
ERYTHROCYTE [DISTWIDTH] IN BLOOD BY AUTOMATED COUNT: 18.7 % (ref 11.5–14.5)
ERYTHROCYTE [DISTWIDTH] IN BLOOD BY AUTOMATED COUNT: 56.7 FL (ref 35–45)
GFR SERPL CREATININE-BSD FRML MDRD: > 90 ML/MIN/1.73M2
GLUCOSE BLD-MCNC: 131 MG/DL (ref 70–108)
GLUCOSE BLD-MCNC: 160 MG/DL (ref 70–108)
GLUCOSE BLD-MCNC: 162 MG/DL (ref 70–108)
GLUCOSE BLD-MCNC: 165 MG/DL (ref 70–108)
GLUCOSE BLD-MCNC: 174 MG/DL (ref 70–108)
HCT VFR BLD CALC: 27.3 % (ref 37–47)
HEMOGLOBIN: 8.2 GM/DL (ref 12–16)
IMMATURE GRANS (ABS): 0.07 THOU/MM3 (ref 0–0.07)
IMMATURE GRANULOCYTES: 0.6 %
LYMPHOCYTES # BLD: 4.9 %
LYMPHOCYTES ABSOLUTE: 0.6 THOU/MM3 (ref 1–4.8)
MAGNESIUM: 1.8 MG/DL (ref 1.6–2.4)
MCH RBC QN AUTO: 25.2 PG (ref 26–33)
MCHC RBC AUTO-ENTMCNC: 30 GM/DL (ref 32.2–35.5)
MCV RBC AUTO: 84 FL (ref 81–99)
MONOCYTES # BLD: 2 %
MONOCYTES ABSOLUTE: 0.2 THOU/MM3 (ref 0.4–1.3)
NUCLEATED RED BLOOD CELLS: 0 /100 WBC
PLATELET # BLD: 242 THOU/MM3 (ref 130–400)
PMV BLD AUTO: 10 FL (ref 9.4–12.4)
POTASSIUM SERPL-SCNC: 3.9 MEQ/L (ref 3.5–5.2)
RBC # BLD: 3.25 MILL/MM3 (ref 4.2–5.4)
SEG NEUTROPHILS: 92.4 %
SEGMENTED NEUTROPHILS ABSOLUTE COUNT: 10.4 THOU/MM3 (ref 1.8–7.7)
SODIUM BLD-SCNC: 143 MEQ/L (ref 135–145)
WBC # BLD: 11.3 THOU/MM3 (ref 4.8–10.8)

## 2019-05-10 PROCEDURE — 2500000003 HC RX 250 WO HCPCS: Performed by: NURSE PRACTITIONER

## 2019-05-10 PROCEDURE — 6370000000 HC RX 637 (ALT 250 FOR IP): Performed by: INTERNAL MEDICINE

## 2019-05-10 PROCEDURE — 80048 BASIC METABOLIC PNL TOTAL CA: CPT

## 2019-05-10 PROCEDURE — 2709999900 HC NON-CHARGEABLE SUPPLY

## 2019-05-10 PROCEDURE — 83735 ASSAY OF MAGNESIUM: CPT

## 2019-05-10 PROCEDURE — 1200000003 HC TELEMETRY R&B

## 2019-05-10 PROCEDURE — 6360000002 HC RX W HCPCS: Performed by: NURSE PRACTITIONER

## 2019-05-10 PROCEDURE — 99232 SBSQ HOSP IP/OBS MODERATE 35: CPT | Performed by: INTERNAL MEDICINE

## 2019-05-10 PROCEDURE — 2580000003 HC RX 258: Performed by: NURSE PRACTITIONER

## 2019-05-10 PROCEDURE — 6370000000 HC RX 637 (ALT 250 FOR IP): Performed by: NURSE PRACTITIONER

## 2019-05-10 PROCEDURE — 2580000003 HC RX 258: Performed by: INTERNAL MEDICINE

## 2019-05-10 PROCEDURE — 99232 SBSQ HOSP IP/OBS MODERATE 35: CPT | Performed by: NURSE PRACTITIONER

## 2019-05-10 PROCEDURE — 92526 ORAL FUNCTION THERAPY: CPT

## 2019-05-10 PROCEDURE — 36415 COLL VENOUS BLD VENIPUNCTURE: CPT

## 2019-05-10 PROCEDURE — 82948 REAGENT STRIP/BLOOD GLUCOSE: CPT

## 2019-05-10 PROCEDURE — 85025 COMPLETE CBC W/AUTO DIFF WBC: CPT

## 2019-05-10 PROCEDURE — 6360000002 HC RX W HCPCS: Performed by: INTERNAL MEDICINE

## 2019-05-10 RX ORDER — HYDROCODONE BITARTRATE AND ACETAMINOPHEN 5; 325 MG/1; MG/1
1 TABLET ORAL EVERY 4 HOURS PRN
Status: DISCONTINUED | OUTPATIENT
Start: 2019-05-10 | End: 2019-05-17 | Stop reason: HOSPADM

## 2019-05-10 RX ORDER — SENNA PLUS 8.6 MG/1
1 TABLET ORAL NIGHTLY
Status: DISCONTINUED | OUTPATIENT
Start: 2019-05-10 | End: 2019-05-17 | Stop reason: HOSPADM

## 2019-05-10 RX ORDER — DOCUSATE SODIUM 100 MG/1
100 CAPSULE, LIQUID FILLED ORAL DAILY
Status: DISCONTINUED | OUTPATIENT
Start: 2019-05-10 | End: 2019-05-17 | Stop reason: HOSPADM

## 2019-05-10 RX ORDER — HYDROCODONE BITARTRATE AND ACETAMINOPHEN 5; 325 MG/1; MG/1
2 TABLET ORAL EVERY 4 HOURS PRN
Status: DISCONTINUED | OUTPATIENT
Start: 2019-05-10 | End: 2019-05-17 | Stop reason: HOSPADM

## 2019-05-10 RX ADMIN — INSULIN LISPRO 1 UNITS: 100 INJECTION, SOLUTION INTRAVENOUS; SUBCUTANEOUS at 20:53

## 2019-05-10 RX ADMIN — Medication 10 ML: at 20:45

## 2019-05-10 RX ADMIN — INSULIN GLARGINE 20 UNITS: 100 INJECTION, SOLUTION SUBCUTANEOUS at 10:44

## 2019-05-10 RX ADMIN — FAMOTIDINE 20 MG: 10 INJECTION, SOLUTION INTRAVENOUS at 10:37

## 2019-05-10 RX ADMIN — CITALOPRAM 10 MG: 20 TABLET, FILM COATED ORAL at 10:48

## 2019-05-10 RX ADMIN — MICONAZOLE NITRATE: 20 POWDER TOPICAL at 00:00

## 2019-05-10 RX ADMIN — FAMOTIDINE 20 MG: 10 INJECTION, SOLUTION INTRAVENOUS at 20:52

## 2019-05-10 RX ADMIN — MICONAZOLE NITRATE: 20 POWDER TOPICAL at 20:39

## 2019-05-10 RX ADMIN — ENOXAPARIN SODIUM 40 MG: 40 INJECTION SUBCUTANEOUS at 10:45

## 2019-05-10 RX ADMIN — LISINOPRIL 20 MG: 20 TABLET ORAL at 20:49

## 2019-05-10 RX ADMIN — Medication 10 ML: at 10:42

## 2019-05-10 RX ADMIN — SENNOSIDES 8.6 MG: 8.6 TABLET, FILM COATED ORAL at 20:46

## 2019-05-10 RX ADMIN — METOPROLOL TARTRATE 50 MG: 50 TABLET, FILM COATED ORAL at 20:46

## 2019-05-10 RX ADMIN — INSULIN GLARGINE 20 UNITS: 100 INJECTION, SOLUTION SUBCUTANEOUS at 20:55

## 2019-05-10 RX ADMIN — SODIUM CHLORIDE: 9 INJECTION, SOLUTION INTRAVENOUS at 10:36

## 2019-05-10 RX ADMIN — INSULIN LISPRO 1 UNITS: 100 INJECTION, SOLUTION INTRAVENOUS; SUBCUTANEOUS at 13:52

## 2019-05-10 RX ADMIN — METOPROLOL TARTRATE 50 MG: 50 TABLET, FILM COATED ORAL at 10:47

## 2019-05-10 RX ADMIN — LISINOPRIL 20 MG: 20 TABLET ORAL at 10:47

## 2019-05-10 RX ADMIN — INSULIN LISPRO 1 UNITS: 100 INJECTION, SOLUTION INTRAVENOUS; SUBCUTANEOUS at 17:49

## 2019-05-10 RX ADMIN — MICONAZOLE NITRATE: 20 POWDER TOPICAL at 10:43

## 2019-05-10 RX ADMIN — CEFTRIAXONE SODIUM 2 G: 2 INJECTION, POWDER, FOR SOLUTION INTRAMUSCULAR; INTRAVENOUS at 10:39

## 2019-05-10 ASSESSMENT — PAIN SCALES - GENERAL
PAINLEVEL_OUTOF10: 0
PAINLEVEL_OUTOF10: 0

## 2019-05-10 NOTE — ANESTHESIA PRE PROCEDURE
Department of Anesthesiology  Preprocedure Note       Name:  Sonja Phipps   Age:  72 y.o.  :  1945                                          MRN:  [id number]         Date:  2019      Surgeon: Nataliia Esposito):  Bruno Haile MD    Procedure: CYSTO, RIGHT URETEROSCOPY POSS LASER LITHOTRIPSY, BASKET RETRIEVAL OF STONES, STENT (N/A Ureter)    Medications prior to admission:   Prior to Admission medications    Medication Sig Start Date End Date Taking?  Authorizing Provider   cefTRIAXone (ROCEPHIN) 500 MG injection Infuse 2,000 mg intravenously every 24 hours 19 Yes Efe Booth MD   senna (SENOKOT) 8.6 MG tablet Take 1 tablet by mouth as needed for Constipation   Yes Historical Provider, MD   amLODIPine (NORVASC) 5 MG tablet Take 5 mg by mouth daily   Yes Historical Provider, MD   clopidogrel (PLAVIX) 75 MG tablet Take 75 mg by mouth nightly   Yes Historical Provider, MD   insulin glargine (LANTUS) 100 UNIT/ML injection vial Inject into the skin nightly   Yes Historical Provider, MD   metoprolol tartrate (LOPRESSOR) 50 MG tablet Take 50 mg by mouth 2 times daily   Yes Historical Provider, MD   atorvastatin (LIPITOR) 20 MG tablet Take 20 mg by mouth nightly   Yes Historical Provider, MD   calcium carbonate 600 MG TABS tablet Take 1 tablet by mouth daily   Yes Historical Provider, MD   citalopram (CELEXA) 10 MG tablet Take 10 mg by mouth daily   Yes Historical Provider, MD   lisinopril (PRINIVIL;ZESTRIL) 20 MG tablet Take 20 mg by mouth 2 times daily   Yes Historical Provider, MD   magnesium 30 MG tablet Take 250 mg by mouth daily   Yes Historical Provider, MD   ranitidine (ZANTAC) 150 MG capsule Take 150 mg by mouth 2 times daily   Yes Historical Provider, MD   docusate sodium (COLACE) 100 MG capsule Take 100 mg by mouth daily   Yes Historical Provider, MD   acetaminophen (TYLENOL) 500 MG tablet Take 2 tablets by mouth every 6 hours as needed for Pain 4/15/19   Olga Matias, APRN - CNP   ondansetron (ZOFRAN) 4 MG tablet Take 1 tablet by mouth daily as needed for Nausea or Vomiting 4/15/19   Hazle Litten, APRN - CNP       Current medications:    Current Facility-Administered Medications   Medication Dose Route Frequency Provider Last Rate Last Dose    famotidine (PEPCID) injection 20 mg  20 mg Intravenous BID Bella Meier MD        promethazine (PHENERGAN) injection 12.5 mg  12.5 mg Intramuscular Q6H PRN Di Montes MD   12.5 mg at 05/08/19 2027    ondansetron (ZOFRAN) injection 4 mg  4 mg Intravenous Q6H PRN Di Montes MD   4 mg at 05/09/19 1712    magnesium replacement protocol   Other RX Placeholder Di Montes MD        potassium (CARDIAC) replacement protocol   Other RX Placeholder Di Montes MD        cefTRIAXone (ROCEPHIN) 1 g IVPB in 50 mL D5W minibag  1 g Intravenous Q24H Di Montes MD   Stopped at 05/09/19 1210    amLODIPine (NORVASC) tablet 5 mg  5 mg Oral Daily [employer], PA-C        acetaminophen (TYLENOL) tablet 1,000 mg  1,000 mg Oral Q6H PRN [employer], PA-C        citalopram (CELEXA) tablet 10 mg  10 mg Oral Daily [employer], PA-C        lisinopril (PRINIVIL;ZESTRIL) tablet 20 mg  20 mg Oral BID [employer], PA-C        metoprolol tartrate (LOPRESSOR) tablet 50 mg  50 mg Oral BID [employer], PA-C        insulin glargine (LANTUS) injection vial 20 Units  20 Units Subcutaneous BID [employer], PA-C   20 Units at 05/09/19 0001    sodium chloride flush 0.9 % injection 10 mL  10 mL Intravenous 2 times per day [employer] PA-C   10 mL at 05/09/19 0931    sodium chloride flush 0.9 % injection 10 mL  10 mL Intravenous PRN Becka Hernandez PA-C        magnesium hydroxide (MILK OF MAGNESIA) 400 MG/5ML suspension 30 mL  30 mL Oral Daily PRN [employer], PA-C        enoxaparin (LOVENOX) injection 40 mg  40 mg Subcutaneous Daily Becka R Brown, PA-C        0.9 % sodium chloride infusion   Intravenous Continuous [employer], PA-C 75 mL/hr at ([facility]     Right sided weakness    Chronic kidney disease     Diabetes mellitus ([facility]     Discitis     GERD (gastroesophageal reflux disease)     Dysphagia/GERD    Hypertension        Past Surgical History:        Procedure Laterality Date    HYSTERECTOMY         Social History:    Social History     Tobacco Use    Smoking status: Never Smoker    Smokeless tobacco: Never Used   Substance Use Topics    Alcohol use: Never     Frequency: Never                                Counseling given: Not Answered      Vital Signs (Current):   Vitals:    05/09/19 0914 05/09/19 1125 05/09/19 1629 05/09/19 1934   BP: 133/67 (!) 145/72 (!) 155/73 (!) 146/68   Pulse: 91 93 107 93   Resp: 16 18 18 18   Temp: 98.1 °F (36.7 °C) 97.9 °F (36.6 °C) 98.9 °F (37.2 °C) 99.6 °F (37.6 °C)   TempSrc: Oral Oral Oral Axillary   SpO2: 93% 93% 90% 91%   Weight:       Height:                                                  BP Readings from Last 3 Encounters:   05/09/19 (!) 146/68   04/15/19 129/62       NPO Status:                                                                                 BMI:   Wt Readings from Last 3 Encounters:   05/09/19 177 lb 6.4 oz (80.5 kg)   04/04/19 213 lb 14.4 oz (97 kg)     Body mass index is 31.42 kg/m².     CBC:   Lab Results   Component Value Date    WBC 14.6 05/09/2019    RBC 3.52 05/09/2019    HGB 9.1 05/09/2019    HCT 29.1 05/09/2019    MCV 82.7 05/09/2019     05/09/2019       CMP:   Lab Results   Component Value Date     05/09/2019    K 4.0 05/09/2019     05/09/2019    CO2 25 05/09/2019    BUN 7 05/09/2019    CREATININE 0.8 05/09/2019    LABGLOM 71 05/09/2019    GLUCOSE 138 05/09/2019    PROT 6.2 04/15/2019    CALCIUM 8.5 05/09/2019    BILITOT 0.3 04/15/2019    ALKPHOS 94 04/15/2019    AST 8 04/15/2019    ALT <5 04/15/2019       POC Tests:   Recent Labs     05/09/19  2004   POCGLU 101       Coags:   Lab Results   Component Value Date    INR 1.49 04/08/2019       HCG (If Applicable):

## 2019-05-10 NOTE — PROGRESS NOTES
Hospitalist Progress Note    Patient:  Ronald Wagner      Unit/Bed:4A-08/008-A    YOB: 1945    MRN: [id number]       Acct: [de-identified]     PCP: Jamey Euceda MD    Date of Admission: 5/8/2019    Assessment/Plan:    Ureteral stone with hydronephrosis on the right side: IV hydration, empiric antibiotics, urology on board planning for ureteroscopy. 5/10- patient had cystoscopy and right ureteroscopy and a renal scope E and basket retrieval of stones with removal of bladder stones and right ureteral stent was placed on 5/9/19 , no more nausea or vomiting,     Nausea and vomiting secondary to ureteral colic- pain improved significantly after stent placement    Hypokalemia: 2.8  prior to admission Replaced  Hypomagnesemia 1.3 on admission replaced    Possible dysphagia, swallowing evaluation ordered and also x-ray being done to evaluate for possible aspiration  -Currently on puréed diet    Atelectasis on the right lower lobe, encourage incentive spirometry    Essential hypertension: Norvasc, lisinopril, metoprolol    Diabetes mellitus type 2, insulin-dependent, chronic kidney disease stage III: Lantus 22 times daily, sliding scale insulin  Lab Results   Component Value Date    LABA1C 6.6 (H) 04/04/2019     No results found for: EAG     Severe protein calorie malnutrition as per dietitian, encourage supplementation    Gastroesophageal reflux disease: PPI    History of stroke with right-sided hemiplegia, partial, bed bound: HOLD PLAVIX , will resume statin and continue blood pressure medications      Discitis, Proteus mirabilis, April 2019? Stop date antibiotics, 5/ 22??:  On Rocephin 2 g daily    Expected discharge date:  2 days    Disposition:    [] Home       [] TCU       [] Rehab       [] Psych       [x] SNF       [] [place]       [] Other-    Chief Complaint:  Persistent nausea and vomiting and abdominal discomfort.     Patient transferred from [facility] for further evaluation. Patient was discharged recently on antibiotics for discitis and continued to have some nausea and vomiting almost on a daily basis but over the last 2 days has been having persistent nausea and vomiting and started having abdominal discomfort and was evaluated in the emergency room and diagnosed to have a 8 mm ureteral stone with hydronephrosis and was transferred here for urology consultation. Patient was recently here from 4/4-4/15/19, with chief complaint of 3 weeks nausea and vomiting and possible discitis at T8-9 and 6 weeks prior had Proteus bacteremia. Status post biopsy and aspiration of the thoracic spine at T9-T10 done on 4/8/19 and Proteus mirabilis was positive and was discharged home on ceftriaxone     Hospital Course: Patient admitted to the hospital and started on gentle hydration and antibiotics were resumed and infectious disease was consulted along with urology. Electrolytes were low with potassium of 2.8 and magnesium 1.3 and were replaced. Patient has been having some cough after eating and there is concern for aspiration and swallowing evaluation was ordered. Chest x-ray done to evaluate for possible right-sided effusion versus pneumonia. CT scan suggests more of atelectasis. Urology planning for ureteroscopy today, hemodynamically stable. Discussed in person with urology with infectious disease    5/10- no more nausea or vomiting, no pain after the stent was placed, had several stones in the bladder and also in the right ureter and were retrieved, and follow pathology, has been unable to manage her at home, still needs antibiotics until 5/16/19, plan for skilled nursing facility.   Ceftriaxone dose was increased to 2 g IV, home dose, encourage incentive spirometry, doing very poorly  Candida in urine, colonization,     Subjective (past 24 hours):    Feeling a lot better today, no nausea vomiting or pain, feeling a little tired, no chest pain or shortness of breath   Medications:  Reviewed    Infusion Medications    sodium chloride 75 mL/hr at 05/09/19 1144    dextrose       Scheduled Medications    cefTRIAXone (ROCEPHIN) IV  2 g Intravenous Q24H    famotidine (PEPCID) injection  20 mg Intravenous BID    miconazole   Topical BID    magnesium replacement protocol   Other RX Placeholder    potassium (CARDIAC) replacement protocol   Other RX Placeholder    amLODIPine  5 mg Oral Daily    citalopram  10 mg Oral Daily    lisinopril  20 mg Oral BID    metoprolol tartrate  50 mg Oral BID    insulin glargine  20 Units Subcutaneous BID    sodium chloride flush  10 mL Intravenous 2 times per day    enoxaparin  40 mg Subcutaneous Daily    insulin lispro  0-6 Units Subcutaneous TID WC    insulin lispro  0-3 Units Subcutaneous Nightly     PRN Meds: HYDROcodone 5 mg - acetaminophen **OR** HYDROcodone 5 mg - acetaminophen, promethazine, ondansetron, sodium chloride flush, magnesium hydroxide, metoclopramide, acetaminophen, glucose, dextrose, glucagon (rDNA), dextrose      Intake/Output Summary (Last 24 hours) at 5/10/2019 0947  Last data filed at 5/10/2019 4386  Gross per 24 hour   Intake 1681 ml   Output 205 ml   Net 1476 ml       Diet:  Dietary Nutrition Supplements: Diabetic Oral Supplement  Dietary Nutrition Supplements: Frozen Oral Supplement  DIET DYSPHAGIA II MECHANICALLY ALTERED; Nectar Thick    Exam:  /76   Pulse 70   Temp 97.8 °F (36.6 °C) (Oral)   Resp 16   Ht 5' 3\" (1.6 m)   Wt 180 lb 11.2 oz (82 kg)   LMP  (LMP Unknown) Comment: post menopase  SpO2 97%   BMI 32.01 kg/m²     Physical Examination:   General appearance - alert, awake  and inno distress, resting comfortably   HEENT: Normocephalic, Atraumatic, pupils reactive, mucous membranes moist  Chest - moving equally to respiration,symmetric air entry,rhonchi/rales appreciated on the right posterior   Heart - normal rate, regular rhythm, normal S1, S2, no murmurs  Abdomen - soft,non tender, no masses or organomegaly, BS+  Neurological - alert, oriented, slow  normal speech, sensations intact and  unable to move extremities much except the left upper except on the right side upper 2-3/5, poor hand ,  Lower extremities left slight wiggle, right lower 0/5  Extremities - peripheral pulses normal, +1 pedal edema,  Capillary refill less than 3 sec  Skin - normal coloration and turgor, no rashes        Labs:   Recent Labs     05/08/19  2248 05/09/19  0558 05/10/19  0320   WBC 16.7* 14.6* 11.3*   HGB 9.7* 9.1* 8.2*   HCT 31.7* 29.1* 27.3*    326 242     Recent Labs     05/08/19  2000 05/09/19  0212 05/09/19  0558 05/10/19  0320     --  143 143   K 3.3* 3.9 4.0 3.9     --  103 109   CO2 24  --  25 24   BUN 7  --  7 7   CREATININE 0.9  --  0.8 0.6   CALCIUM 8.5  --  8.5 8.0*     No results for input(s): AST, ALT, BILIDIR, BILITOT, ALKPHOS in the last 72 hours. No results for input(s): INR in the last 72 hours. No results for input(s): Rory Grimes in the last 72 hours. Microbiology:      Urinalysis:      Lab Results   Component Value Date    NITRU NEGATIVE 05/09/2019    WBCUA > 200 05/09/2019    BACTERIA FEW 05/09/2019    RBCUA 5-10 05/09/2019    BLOODU MODERATE 05/09/2019    GLUCOSEU NEGATIVE 05/09/2019       Radiology:  XR CHEST PORTABLE   Final Result   1. Mild increased perihilar interstitial infiltrates bilaterally. This may be related to mild interstitial edema. 2. Small right-sided pleural effusion. This appears slightly improved from the prior examination. 3. Multifocal patchy mild airspace opacities at the medial right upper lung zone and lateral right lung base may represent mild atelectasis or pneumonia. This appears improved at the right lung base but slightly worsened at the medial right upper lung    zone. 4. Recommend short-term follow-up radiographic evaluation. If findings persist, further characterization could be obtained with chest CT. **This report has been created using voice recognition software. It may contain minor errors which are inherent in voice recognition technology. **      Final report electronically signed by Dr. Sulma Parker on 5/9/2019 3:54 PM      XR COMPARISON OF OUTSIDE FILMS   Final Result      CT COMPARISON OF OUTSIDE FILMS   Final Result          DVT prophylaxis: [x] Lovenox                                 [] SCDs                                 [] SQ Heparin                                 [] Encourage ambulation           [] Already on Anticoagulation     Code Status: Full Code    PT/OT Eval Status: ordered    Tele:   [x] yes             [] no    Active Hospital Problems    Diagnosis Date Noted    Severe malnutrition ([facility] [address] [E43] 05/09/2019     Class: Acute    Hydronephrosis with ureteral calculus [M25.3]     Renal colic on right side [N54]     Ureteral stone [N20.1] 05/08/2019    Hypomagnesemia [E83.42]     Hypokalemia [E87.6]     Discitis [M46.40]     History of stroke with residual deficit [I69.30]     Type 2 diabetes mellitus with stage 3 chronic kidney disease, with long-term current use of insulin ([facility] [address] [W33.58, N18.3, Z79.4] 04/04/2019       Electronically signed by Alanna Moe MD on 5/10/2019 at 9:47 AM

## 2019-05-10 NOTE — DISCHARGE INSTR - COC
Continuity of Care Form    Patient Name: Aramis Recinos   :  1945  MRN:  [id number]    Admit date:  2019  Discharge date:  ***    Code Status Order: Full Code   Advance Directives:   Advance Care Flowsheet Documentation     Date/Time Healthcare Directive Type of Healthcare Directive Copy in [address] Agent's Name Healthcare Agent's Phone Number    [phone]  No, patient does not have an advance directive for healthcare treatment -- -- -- -- --          Admitting Physician:  Jaelyn Hodge MD  PCP: Martinez Yao MD    Discharging Nurse: [facility] Unit/Room#: 4A-08/008-A  Discharging Unit Phone Number: ***    Emergency Contact:   Extended Emergency Contact Information  Primary Emergency Contact: [address] [place] [address] Phone: [phone]  Mobile Phone: [phone]  Relation: Spouse  Preferred language: English   needed? No  Secondary Emergency Contact: [place] [address] Phone: [phone]  Mobile Phone: [phone]  Relation: Other  Preferred language: English   needed? No    Past Surgical History:  Past Surgical History:   Procedure Laterality Date    CYSTO/URETERO/PYELOSCOPY, CALCULUS TX Right 2019    CYSTO, RIGHT URETEROSCOPY, RIGHT RENOSCOPY, BASKET RETRIEVAL OF STONES, REMOVAL OF BLADDER STONES, STENT PLACEMENT RIGHT URETER performed by Arnaldo Tatum MD at [address]         Immunization History: There is no immunization history on file for this patient.     Active Problems:  Patient Active Problem List   Diagnosis Code    Osteomyelitis ([address] M86.9    Acute cystitis without hematuria N30.00    Type 2 diabetes mellitus with stage 3 chronic kidney disease, with long-term current use of insulin ([address] E11.22, N18.3, Z79.4    Essential hypertension I10    Severe protein-calorie malnutrition ([address] E43    Examination for normal comparison for clinical research Z00.6    Discitis M46.40 Conduit: {YES / OD:37125}       Date of Last BM: ***    Intake/Output Summary (Last 24 hours) at 5/10/2019 0957  Last data filed at 5/10/2019 0313  Gross per 24 hour   Intake 1681 ml   Output 205 ml   Net 1476 ml     I/O last 3 completed shifts:   In: 12 [P.O.:50; I.V.:1631]  Out: 205 [Urine:200; Blood:5]    Safety Concerns:     [facility] [email or web address] Safety Concerns:775728621}    Impairments/Disabilities:      [facility] Impairments/Disabilities:915618387}    Nutrition Therapy:  Current Nutrition Therapy:   [facility] Diet List:837993314}    Routes of Feeding: {[facility] DME Other Feedings:300027960}  Liquids: {Slp liquid thickness:70204}  Daily Fluid Restriction: {[facility] DME Yes amt example:277296931}  Last Modified Barium Swallow with Video (Video Swallowing Test): {Done Not Done EUPS:001763765}    Treatments at the Time of Hospital Discharge:   Respiratory Treatments: ***  Oxygen Therapy:  {Therapy; copd oxygen:13317}  Ventilator:    { CC Vent CJCV:207443547}    Rehab Therapies: {THERAPEUTIC INTERVENTION:9081219036}  Weight Bearing Status/Restrictions: [facility] Weight Bearin}  Other Medical Equipment (for information only, NOT a DME order):  {EQUIPMENT:448539092}  Other Treatments: ***    Patient's personal belongings (please select all that are sent with patient):  {[facility] DME Belongings:675522317}    RN SIGNATURE:  {Esignature:488205251}    CASE MANAGEMENT/SOCIAL WORK SECTION    Inpatient Status Date: ***    Readmission Risk Assessment Score:  Readmission Risk              Risk of Unplanned Readmission:        20           Discharging to Facility/ Agency   · Name:   · Address:  · Phone:  · Fax:    Dialysis Facility (if applicable)   · Name:  · Address:  · Dialysis Schedule:  · Phone:  · Fax:    / signature: {Esignature:350915661}    PHYSICIAN SECTION    Prognosis: {Prognosis:2504674260}    Condition at Discharge: [facility] Patient Condition:957907336}    Rehab Potential (if transferring to Rehab): {Prognosis:2423897796}    Recommended Labs or Other Treatments After Discharge: ***    Physician Certification: I certify the above information and transfer of Lanette Monroy  is necessary for the continuing treatment of the diagnosis listed and that she requires {Admit to Appropriate Level of Care:71187} for {GREATER/LESS:053575670} 30 days.      Update Admission H&P: {CHP DME Changes in EKXRO:314130699}    PHYSICIAN SIGNATURE:  {Esignature:819318805}

## 2019-05-10 NOTE — PLAN OF CARE
Problem: Falls - Risk of:  Goal: Will remain free from falls  Description  Will remain free from falls  5/10/2019 0104 by Thang Ibanez RN  Note:   Fall precautions maintained this shift. Nonskid socks on, 3/4 side rails raised, bed alarm on, call light within reach. No falls. Problem: Risk for Impaired Skin Integrity  Goal: Tissue integrity - skin and mucous membranes  Description  Structural intactness and normal physiological function of skin and  mucous membranes. 5/10/2019 0104 by Thang Ibanez RN  Note:   Pt turned and repositioned every 2 hours with pillow support. Heels elevated off of bed. No new skin breakdown noted. Problem: Nausea/Vomiting:  Goal: Absence of nausea/vomiting  Description  Absence of nausea/vomiting  5/10/2019 0104 by Thang Ibanez RN  Note:   No N/V noted this shift. Problem: DISCHARGE BARRIERS  Goal: Patient's continuum of care needs are met  5/10/2019 0104 by Thang Ibanez RN  Note:   Pt back to home with [place]. Problem: Pain:  Goal: Control of acute pain  Description  Control of acute pain  Note:   No c/o pain post-op this shift. Care plan reviewed with patient and spouse. Patient and spouse verbalize understanding of the plan of care and contribute to goal setting.

## 2019-05-10 NOTE — ANESTHESIA POSTPROCEDURE EVALUATION
Department of Anesthesiology  Postprocedure Note    Patient: Connor Mckinnon  MRN: [id number]  YOB: 1945  Date of evaluation: 5/9/2019  Time:  11:13 PM     Procedure Summary     Date:  05/09/19 [facility]    Anesthesia Start:  2133 Anesthesia Stop:  2235    Procedure:  CYSTO, RIGHT URETEROSCOPY, RIGHT RENOSCOPY, BASKET RETRIEVAL OF STONES, REMOVAL OF BLADDER STONES, STENT PLACEMENT RIGHT URETER (Right Ureter) Diagnosis:  (stone)    Surgeon:  Marizol Rossi MD Responsible Provider:  Phillip Harrington MD    Anesthesia Type:  general ASA Status:  4          Anesthesia Type: general    Lamine Phase I: Lamine Score: 7    Lamine Phase II:      Last vitals: Reviewed and per EMR flowsheets.        Anesthesia Post Evaluation    Patient location during evaluation: PACU  Patient participation: complete - patient participated  Level of consciousness: awake  Airway patency: patent  Nausea & Vomiting: no vomiting and no nausea  Complications: no  Cardiovascular status: hemodynamically stable  Respiratory status: acceptable  Hydration status: stable

## 2019-05-10 NOTE — PROGRESS NOTES
[address] [place] THERAPY  [facility] NEUROSCIENCES 4A    TIME   SLP Individual Minutes  Time In: 4751  Time Out: 9475  Minutes: 9  Timed Code Treatment Minutes: 0 Minutes       [x]Daily Note  []Progress Note  []Discharge Note    Date: 5/10/2019  Patient Name: Agatha Witt        MRN: [id number]    : 1945  (77 y.o.)  Gender: female   Primary Provider: Elver Koehler MD  Admitting Diagnosis:  Ureteral stone with hydronephrosis  Secondary Diagnosis: dysphagia   Precautions: aspiration risk; fall risk   Swallowing Status/Diet: dysphagia II with nectar thick liquids  Swallowing Strategies: small bites/sips, no straw, upright position  DATE of last BSE: 19  Pain:  Mild back pain     Subjective: Pt seen at bedside with  in room. Pt tolerating head of bed elevated to approximately 45 degree this date (as compared to approximately 30 degrees upon evaluation yesterday). ST provided ongoing education re: importance of upright positioning as tolerable for safe PO intake. SHORT TERM GOAL #1:  Goal 1: Pt will tolerate dysphagia II diet with nectar thick liquids (NO straws) without overt s/s of aspiration to meet nutrition and hydration needs safely. INTERVENTIONS: x6 sips of nectar thick liquid via cup. Pt with delayed cough x1 following success sips at once. ST educated pt to take small sips. Further recommendations for upright positioning as tolerated throughout meals with rest breaks (reclined positioning) intermittently as needed for comfort. Recommend continued dysphagia II diet with nectar thick liquid, no straws at this time. SHORT TERM GOAL #2:  Goal 2: Pt will tolerate advanced PO trials without overt s/s of aspiration to advance to least restrictive PO diet. INTERVENTIONS: Pt refused trials of hard solid x3 this date, stating, \"I do not want to eat anything right now. \"    SHORT TERM GOAL #3:  Goal 3: Pt will complete pharyngeal strengthening exercises x10-15 to improve swallow functional and safety with PO intake. INTERVENTIONS: DNT due to focus on diet monitor         ASSESSMENT:  Assessment: []Progressing towards goals          [x]Not Progressing towards goals       Patient Tolerance of Treatment:   []Tolerated well [x]Tolerated fair []Required rest breaks []Fatigued  Plan for Next Session: advanced trials   Education:  Learner:  [x]Patient          [x]Significant Other          []Other  Education provided regarding:  []Goals and POC   [x]Diet and swallowing precautions    []Home Exercise Program  [x]Progress and/or discharge information  Method of Education:  []Discussion          []Demonstration          []Handout          []Other  Evaluation of Education:   [x]Verbalized understanding   [x]Demonstrates without assistance  []Demonstrates with assistance  [x]Needs further instruction     []No evidence of learning                  []No family present      Plan: [x]Continue with current plan of care    []Modify current plan of care as follows:    []Discharge patient    Discharge Location:    Services/Supervision Recommended:     [x]Patient continues to require treatment by a licensed therapist to address functional deficits as outlined in the established plan of care.     ESRA Coffey. - Student Intern

## 2019-05-10 NOTE — CARE COORDINATION
5/10/19, 9:52 AM    DISCHARGE BARRIERS  Spoke with Kwaku Nieto and her spouse Riky Benson. They are agreeable to [facility] short term at discharge. She will have IV antibiotics at discharge. She is a precert through her [employer] If they deny her stay in the ECF at discharge he is agreeable to pay privately for the ECF while she finishes up her antibiotics. Update 10:26am: Made referral to Ashleigh at [facility]. SW faxed all requested information. Update 3:20pm: Spoke with Deonna at [facility]. They can accept Kwaku Nieto at discharge. Will need to start the precert for insurance once she is seen by PT/OT. Patient and spouse are aware Kwaku Nieto has been accepted to the ECF.

## 2019-05-10 NOTE — PLAN OF CARE
DATE:  10/15/2018



SUBJECTIVE:  She is in her room in the psych floor.  She is still having

back pain, eventually on the Motrin 600, I will increase it to 800.  She is

on Ativan, Cozaar for blood pressure, Ecotrin, Effexor, folic acid, insulin

coverage, Imodium, Klonopin, Lidoderm for the back pain, Lipitor for

cholesterol.  I bumped up her Motrin to 800 mg from 600 mg,hoping to help

her.  She is also on Neurontin, Norvasc, Protonix, Seroquel, vitamin,

Zestril, and Zofran.



PHYSICAL EXAMINATION:

VITAL SIGNS:  She has a 97.8 temperature, 59 pulse, 107/68 blood pressure,

18 respiratory rate.



ASSESSMENT AND PLAN:  She is here for multiple issues, hypertension, low

back pain, diabetes, congestive heart failure, asthma, depression.  I have

increased her medications, hopefully, she will start to improve.







__________________________________________

Brian Stanley DO



DD:  10/15/2018 9:14:45

DT:  10/15/2018 10:38:16

Job # [id number] Problem: Falls - Risk of:  Goal: Will remain free from falls  Description  Will remain free from falls  5/10/2019 1125 by Chucho Pichardo RN  Outcome: Ongoing  Note:   Call light in reach, bed in lowest position, and bed alarm activated. Education given on use of call light before ambulation and when in need of assistance. Patient expressed understanding. Hourly visual checks performed and charted. Toileting offered to patient. No falls this shift, at any time. Arm band and falling star in place. Will continue to monitor. 5/10/2019 0104 by Zayda Hoang RN  Note:   Fall precautions maintained this shift. Nonskid socks on, 3/4 side rails raised, bed alarm on, call light within reach. No falls. Problem: Risk for Impaired Skin Integrity  Goal: Tissue integrity - skin and mucous membranes  Description  Structural intactness and normal physiological function of skin and  mucous membranes. 5/10/2019 1125 by Chucho Pichardo RN  Outcome: Ongoing  Note:   Patient has barrier cream applied, Desenex, and wicking material between abd folds. 5/10/2019 0104 by Zayda Hoang RN  Note:   Pt turned and repositioned every 2 hours with pillow support. Heels elevated off of bed. No new skin breakdown noted. Problem: Nausea/Vomiting:  Goal: Absence of nausea/vomiting  Description  Absence of nausea/vomiting  5/10/2019 1125 by Chucho Pichardo RN  Outcome: Ongoing  5/10/2019 0104 by Zayda Hoang RN  Note:   No N/V noted this shift. Problem: DISCHARGE BARRIERS  Goal: Patient's continuum of care needs are met  5/10/2019 1125 by Chucho Pichardo RN  Outcome: Ongoing  Note:   Discharge planning in process and discussed with patient/family. Social work consulted for any additional needs. Care manager aware of discharge needs. 5/10/2019 0104 by Zayda Hoang RN  Note:   Pt back to home with [facility].       Problem:   Goal: No urinary complication  Outcome: Ongoing     Problem: Pain:  Goal: Pain level will

## 2019-05-10 NOTE — PROGRESS NOTES
2233-Patient to PACU report received from Dr Meeta Flores and [facility] Mccartney RN. Patient responds to voice. Denies pain or discomfort. IV infusing no redness or swelling at site noted. VSS  2240-Patient resting quietly in bed. Responds to voice. Denies pain or discomfort. VSS  2253-Patient resting quietly in bed. Denies pain or discomfort. VSS  2300-Patient resting quietly in bed. VSS. Report called to Bertha FONTENOT. 2310-Patient meets DC criteria. Resting quietly in bed. Denies pain or discomfort. VSS. Report already called and family notified.

## 2019-05-10 NOTE — PROGRESS NOTES
Pt to 4A08 via bed from PACU. VS obtained, placed on unit telemetry. Pt responds to voice, alert and oriented, no c/o pain. PICC in L arm infusing 0.9 at 100ml/hr.

## 2019-05-10 NOTE — PROGRESS NOTES
Progress note: Infectious diseases    Patient - Nanette Quesada,  Age - 72 y.o.    - 1945      Room Number - 4A-08/008-A   MRN -  [id number]   Acct # - [de-identified]  Date of Admission -  2019  6:44 PM    SUBJECTIVE:   She feels better.  is overwhelmed taking care of her. He is open to the idea of going to ECF to continue the antibiotic and do therapy. She had stent   OBJECTIVE   VITALS    height is 5' 3\" (1.6 m) and weight is 180 lb 11.2 oz (82 kg). Her oral temperature is 97.8 °F (36.6 °C). Her blood pressure is 133/76 and her pulse is 70. Her respiration is 16 and oxygen saturation is 97%. Wt Readings from Last 3 Encounters:   05/10/19 180 lb 11.2 oz (82 kg)   19 213 lb 14.4 oz (97 kg)       I/O (24 Hours)    Intake/Output Summary (Last 24 hours) at 5/10/2019 0900  Last data filed at 5/10/2019 0313  Gross per 24 hour   Intake 1681 ml   Output 205 ml   Net 1476 ml       General Appearance  Awake, alert, oriented, chronically sick looking. HEENT - normocephalic, atraumatic, pink conjunctiva,  anicteric sclera  Neck - Supple, no mass  Lungs -  Bilateral good air entry, no rhonchi, no wheeze  Cardiovascular - Heart sounds are normal.     Abdomen - soft, not distended, nontender,   Neurologic - oriented.  Right side weakness  Skin - No bruising or bleeding  Extremities - No edema, no cyanosis, clubbing     MEDICATIONS:      cefTRIAXone (ROCEPHIN) IV  2 g Intravenous Q24H    famotidine (PEPCID) injection  20 mg Intravenous BID    miconazole   Topical BID    magnesium replacement protocol   Other RX Placeholder    potassium (CARDIAC) replacement protocol   Other RX Placeholder    amLODIPine  5 mg Oral Daily    citalopram  10 mg Oral Daily    lisinopril  20 mg Oral BID    metoprolol tartrate  50 mg Oral BID    insulin glargine  20 Units Subcutaneous BID    sodium chloride flush  10 mL Intravenous 2 times per day    enoxaparin  40 mg Subcutaneous Daily    insulin lispro  0-6 Units Subcutaneous TID     insulin lispro  0-3 Units Subcutaneous Nightly      sodium chloride 75 mL/hr at 05/09/19 1144    dextrose       HYDROcodone 5 mg - acetaminophen **OR** HYDROcodone 5 mg - acetaminophen, promethazine, ondansetron, sodium chloride flush, magnesium hydroxide, metoclopramide, acetaminophen, glucose, dextrose, glucagon (rDNA), dextrose      LABS:     CBC:   Recent Labs     05/08/19  2248 05/09/19  0558 05/10/19  0320   WBC 16.7* 14.6* 11.3*   HGB 9.7* 9.1* 8.2*    326 242     BMP:    Recent Labs     05/08/19 2000 05/09/19  0212 05/09/19  0558 05/10/19  0320     --  143 143   K 3.3* 3.9 4.0 3.9     --  103 109   CO2 24  --  25 24   BUN 7  --  7 7   CREATININE 0.9  --  0.8 0.6   GLUCOSE 142*  --  138* 131*     Calcium:  Recent Labs     05/10/19  0320   CALCIUM 8.0*     Ionized Calcium:No results for input(s): IONCA in the last 72 hours. Magnesium:  Recent Labs     05/10/19  0320   MG 1.8     Phosphorus:No results for input(s): PHOS in the last 72 hours. BNP:No results for input(s): BNP in the last 72 hours.   Glucose:  Recent Labs     05/09/19 2004 05/09/19  2300 05/10/19  0831   POCGLU 101 102 160*        CULTURES:   UA:   Recent Labs     05/09/19  1208   PHUR 6.0   COLORU DK YELLOW*   PROTEINU 100*   BLOODU MODERATE*   RBCUA 5-10   WBCUA > 200   BACTERIA FEW   NITRU NEGATIVE   GLUCOSEU NEGATIVE   BILIRUBINUR SMALL*   UROBILINOGEN 0.2   KETUA 40*     Micro:   Lab Results   Component Value Date    BC No growth-preliminary  No growth   04/04/2019         IMAGING:         Problem list of patient:     Patient Active Problem List   Diagnosis Code    Osteomyelitis ([facility] [address] M86.9    Acute cystitis without hematuria N30.00    Type 2 diabetes mellitus with stage 3 chronic kidney disease, with long-term current use of insulin (HCC) E11.22, N18.3, Z79.4    Essential hypertension I10    Severe protein-calorie malnutrition ([address] E43    Examination for normal comparison for clinical research Z00.6    Discitis M46.40    Oliguria R34    Morbid obesity ([address] E66.01    History of stroke with residual deficit I69.30    Hypoglycemia E16.2    Loose stools R19.5    Hyponatremia E87.1    Hypokalemia E87.6    Moderate malnutrition (HCC) E44.0    Urinary retention R33.9    Recurrent UTI N39.0    Nausea and vomiting R11.2    Pleural effusion on right J90    Hypomagnesemia E83.42    Bilateral atelectasis J98.11    Normocytic anemia D64.9    Generalized weakness R53.1    Ureteral stone N20.1    Severe malnutrition ([address] E43    Hydronephrosis with ureteral calculus V86.8    Renal colic on right side J85         ASSESSMENT/PLAN   Right ureteric stone: had stent  discites due to proteus: continue iv rocephin  Old stroke  Dysphagia: likely aspirating  Patient needs to go to [facility]  PT/OT      James Wu MD, FACP 5/10/2019 9:00 AM

## 2019-05-10 NOTE — PROGRESS NOTES
[address]  [address] [address]  Dept: [phone]  Loc: [phone]  Visit Date: 2019  Urology Progress Note    Chief Complaint: Nausea and vomiting    Subjective:   Patient is resting in bed, voiding spontaneously per self, +flatus, -BM, ambulating with assistance, has not had a diet at this point post-operatively, denies any nausea or vomiting. There are complaints of no pain at this time.         Vitals:  /76   Pulse 70   Temp 97.8 °F (36.6 °C) (Oral)   Resp 16   Ht 5' 3\" (1.6 m)   Wt 180 lb 11.2 oz (82 kg)   LMP  (LMP Unknown) Comment: post menopase  SpO2 97%   BMI 32.01 kg/m²   Temp  Av.3 °F (36.8 °C)  Min: 97.5 °F (36.4 °C)  Max: 99.6 °F (37.6 °C)    Intake/Output Summary (Last 24 hours) at 5/10/2019 0749  Last data filed at 5/10/2019 1764  Gross per 24 hour   Intake 1681 ml   Output 205 ml   Net 1476 ml       Social History     Socioeconomic History    Marital status:      Spouse name: Not on file    Number of children: Not on file    Years of education: Not on file    Highest education level: Not on file   Occupational History    Not on file   Social Needs    Financial resource strain: Not on file    Food insecurity:     Worry: Not on file     Inability: Not on file    Transportation needs:     Medical: Not on file     Non-medical: Not on file   Tobacco Use    Smoking status: Never Smoker    Smokeless tobacco: Never Used   Substance and Sexual Activity    Alcohol use: Never     Frequency: Never    Drug use: Never    Sexual activity: Not on file   Lifestyle    Physical activity:     Days per week: Not on file     Minutes per session: Not on file    Stress: Not on file   Relationships    Social connections:     Talks on phone: Not on file     Gets together: Not on file     Attends [employer] service: Not on file     Active member of club or organization: Not on file     Attends meetings of clubs or organizations: Not on file     Relationship status: Not on file    Intimate partner violence:     Fear of current or ex partner: Not on file     Emotionally abused: Not on file     Physically abused: Not on file     Forced sexual activity: Not on file   Other Topics Concern    Not on file   Social History Narrative    Not on file     History reviewed. No pertinent family history. No Known Allergies    Objective:    Constitutional: Alert and oriented times x3, no acute distress, and cooperative to examination with appropriate mood and affect. HEENT:   Head:         Normocephalic and atraumatic. Mucous membranes are normal.   Eyes:         EOM are normal. No scleral icterus. Nose:    The external appearance of the nose is normal  Ears: The ears appear normal to external inspection. Cardiovascular:       Normal rate, regular rhythm. Pulmonary/Chest:  Normal respiratory rate and rhthym. No use of accessory muscles. Lungs diminished. On supportive nasal cannula at 2L   Abdominal:          Soft. No tenderness. Active bowel sounds. Genitalia:    Urinating spontaneously on her own without difficulty. Redness and skin breakdown to labia and groin. Musculoskeletal:    Right side weakness. She exhibits no edema or tenderness of lower extremities. Extremities:    2+ bilateral lower leg edema. Neurological:    Alert and oriented.  Weakness    Labs:  WBC:    Lab Results   Component Value Date    WBC 11.3 05/10/2019     Hemoglobin/Hematocrit:    Lab Results   Component Value Date    HGB 8.2 05/10/2019    HCT 27.3 05/10/2019     BMP:    Lab Results   Component Value Date     05/10/2019    K 3.9 05/10/2019    K 4.0 05/09/2019     05/10/2019    CO2 24 05/10/2019    BUN 7 05/10/2019    LABALBU 1.8 04/15/2019    CREATININE 0.6 05/10/2019    CALCIUM 8.0 05/10/2019    LABGLOM >90 05/10/2019       Impression:    Right ureteral calculus  Right back pain  Nausea/vomiting  Recent pleural effusion  Recent Bacteremia  Recent diskitis/om--Proteus  Hx of CVA with R sided weakness--on Plavix    Plan:    POD # 1 Cystoscopy, right ureteroscopy, right renoscopy, basket retrieval of stones, removal of bladder stones, right ureteral stent placement per Dr. Feliberto Crane    Patient doing well post-operatively. She denies any pain and states she is urinating well on her own. She denies any nausea/vomiting since yesterday. WBC improved today. On Rocephin IV. F/U in 1 week with Dr. Feliberto Crane for ureteral stent removal as discussed. Urology signing off. Please call with questions or concerns.       Jessica Ruano, ELIZABETH  05/10/19 7:49 AM  Urology

## 2019-05-11 LAB
ANION GAP SERPL CALCULATED.3IONS-SCNC: 10 MEQ/L (ref 8–16)
BASOPHILS # BLD: 0.1 %
BASOPHILS ABSOLUTE: 0 THOU/MM3 (ref 0–0.1)
BUN BLDV-MCNC: 11 MG/DL (ref 7–22)
CALCIUM SERPL-MCNC: 8.5 MG/DL (ref 8.5–10.5)
CHLORIDE BLD-SCNC: 107 MEQ/L (ref 98–111)
CO2: 25 MEQ/L (ref 23–33)
CREAT SERPL-MCNC: 0.5 MG/DL (ref 0.4–1.2)
EOSINOPHIL # BLD: 0.1 %
EOSINOPHILS ABSOLUTE: 0 THOU/MM3 (ref 0–0.4)
ERYTHROCYTE [DISTWIDTH] IN BLOOD BY AUTOMATED COUNT: 18.7 % (ref 11.5–14.5)
ERYTHROCYTE [DISTWIDTH] IN BLOOD BY AUTOMATED COUNT: 57.1 FL (ref 35–45)
GFR SERPL CREATININE-BSD FRML MDRD: > 90 ML/MIN/1.73M2
GLUCOSE BLD-MCNC: 103 MG/DL (ref 70–108)
GLUCOSE BLD-MCNC: 115 MG/DL (ref 70–108)
GLUCOSE BLD-MCNC: 66 MG/DL (ref 70–108)
GLUCOSE BLD-MCNC: 68 MG/DL (ref 70–108)
GLUCOSE BLD-MCNC: 69 MG/DL (ref 70–108)
GLUCOSE BLD-MCNC: 73 MG/DL (ref 70–108)
GLUCOSE BLD-MCNC: 75 MG/DL (ref 70–108)
GLUCOSE BLD-MCNC: 75 MG/DL (ref 70–108)
GLUCOSE BLD-MCNC: 84 MG/DL (ref 70–108)
GLUCOSE BLD-MCNC: 88 MG/DL (ref 70–108)
GLUCOSE BLD-MCNC: 91 MG/DL (ref 70–108)
GLUCOSE BLD-MCNC: 91 MG/DL (ref 70–108)
HCT VFR BLD CALC: 28.2 % (ref 37–47)
HEMOGLOBIN: 8.5 GM/DL (ref 12–16)
IMMATURE GRANS (ABS): 0.06 THOU/MM3 (ref 0–0.07)
IMMATURE GRANULOCYTES: 0.7 %
LYMPHOCYTES # BLD: 6 %
LYMPHOCYTES ABSOLUTE: 0.5 THOU/MM3 (ref 1–4.8)
MCH RBC QN AUTO: 25.2 PG (ref 26–33)
MCHC RBC AUTO-ENTMCNC: 30.1 GM/DL (ref 32.2–35.5)
MCV RBC AUTO: 83.7 FL (ref 81–99)
MONOCYTES # BLD: 7.3 %
MONOCYTES ABSOLUTE: 0.6 THOU/MM3 (ref 0.4–1.3)
NUCLEATED RED BLOOD CELLS: 0 /100 WBC
PLATELET # BLD: 230 THOU/MM3 (ref 130–400)
PMV BLD AUTO: 9.6 FL (ref 9.4–12.4)
POTASSIUM SERPL-SCNC: 3.5 MEQ/L (ref 3.5–5.2)
RBC # BLD: 3.37 MILL/MM3 (ref 4.2–5.4)
SEG NEUTROPHILS: 85.8 %
SEGMENTED NEUTROPHILS ABSOLUTE COUNT: 7.2 THOU/MM3 (ref 1.8–7.7)
SODIUM BLD-SCNC: 142 MEQ/L (ref 135–145)
WBC # BLD: 8.4 THOU/MM3 (ref 4.8–10.8)

## 2019-05-11 PROCEDURE — 1200000003 HC TELEMETRY R&B

## 2019-05-11 PROCEDURE — 99232 SBSQ HOSP IP/OBS MODERATE 35: CPT | Performed by: HOSPITALIST

## 2019-05-11 PROCEDURE — 80048 BASIC METABOLIC PNL TOTAL CA: CPT

## 2019-05-11 PROCEDURE — 6370000000 HC RX 637 (ALT 250 FOR IP): Performed by: NURSE PRACTITIONER

## 2019-05-11 PROCEDURE — 97167 OT EVAL HIGH COMPLEX 60 MIN: CPT

## 2019-05-11 PROCEDURE — 6370000000 HC RX 637 (ALT 250 FOR IP): Performed by: HOSPITALIST

## 2019-05-11 PROCEDURE — 2709999900 HC NON-CHARGEABLE SUPPLY

## 2019-05-11 PROCEDURE — 6360000002 HC RX W HCPCS: Performed by: NURSE PRACTITIONER

## 2019-05-11 PROCEDURE — 92526 ORAL FUNCTION THERAPY: CPT | Performed by: SPEECH-LANGUAGE PATHOLOGIST

## 2019-05-11 PROCEDURE — 6360000002 HC RX W HCPCS: Performed by: INTERNAL MEDICINE

## 2019-05-11 PROCEDURE — 85025 COMPLETE CBC W/AUTO DIFF WBC: CPT

## 2019-05-11 PROCEDURE — 2580000003 HC RX 258: Performed by: HOSPITALIST

## 2019-05-11 PROCEDURE — 2500000003 HC RX 250 WO HCPCS: Performed by: NURSE PRACTITIONER

## 2019-05-11 PROCEDURE — 2580000003 HC RX 258: Performed by: NURSE PRACTITIONER

## 2019-05-11 PROCEDURE — 82948 REAGENT STRIP/BLOOD GLUCOSE: CPT

## 2019-05-11 PROCEDURE — 36415 COLL VENOUS BLD VENIPUNCTURE: CPT

## 2019-05-11 PROCEDURE — 2580000003 HC RX 258: Performed by: INTERNAL MEDICINE

## 2019-05-11 RX ORDER — SCOLOPAMINE TRANSDERMAL SYSTEM 1 MG/1
1 PATCH, EXTENDED RELEASE TRANSDERMAL
Status: DISCONTINUED | OUTPATIENT
Start: 2019-05-11 | End: 2019-05-17 | Stop reason: HOSPADM

## 2019-05-11 RX ORDER — DEXTROSE, SODIUM CHLORIDE, SODIUM LACTATE, POTASSIUM CHLORIDE, AND CALCIUM CHLORIDE 5; .6; .31; .03; .02 G/100ML; G/100ML; G/100ML; G/100ML; G/100ML
INJECTION, SOLUTION INTRAVENOUS CONTINUOUS
Status: DISCONTINUED | OUTPATIENT
Start: 2019-05-11 | End: 2019-05-13

## 2019-05-11 RX ORDER — ACETAMINOPHEN 650 MG/1
650 SUPPOSITORY RECTAL EVERY 4 HOURS PRN
Status: DISCONTINUED | OUTPATIENT
Start: 2019-05-11 | End: 2019-05-11

## 2019-05-11 RX ADMIN — Medication 10 ML: at 11:01

## 2019-05-11 RX ADMIN — CEFTRIAXONE SODIUM 2 G: 2 INJECTION, POWDER, FOR SOLUTION INTRAMUSCULAR; INTRAVENOUS at 10:56

## 2019-05-11 RX ADMIN — AMLODIPINE BESYLATE 5 MG: 5 TABLET ORAL at 13:01

## 2019-05-11 RX ADMIN — MICONAZOLE NITRATE: 20 POWDER TOPICAL at 21:07

## 2019-05-11 RX ADMIN — METOCLOPRAMIDE 10 MG: 5 INJECTION, SOLUTION INTRAMUSCULAR; INTRAVENOUS at 05:27

## 2019-05-11 RX ADMIN — HYDROCODONE BITARTRATE AND ACETAMINOPHEN 1 TABLET: 5; 325 TABLET ORAL at 05:21

## 2019-05-11 RX ADMIN — MICONAZOLE NITRATE: 20 POWDER TOPICAL at 11:01

## 2019-05-11 RX ADMIN — SODIUM CHLORIDE: 9 INJECTION, SOLUTION INTRAVENOUS at 01:11

## 2019-05-11 RX ADMIN — DEXTROSE MONOHYDRATE 12.5 G: 25 INJECTION, SOLUTION INTRAVENOUS at 14:03

## 2019-05-11 RX ADMIN — FAMOTIDINE 20 MG: 10 INJECTION, SOLUTION INTRAVENOUS at 10:57

## 2019-05-11 RX ADMIN — METOPROLOL TARTRATE 50 MG: 50 TABLET, FILM COATED ORAL at 13:05

## 2019-05-11 RX ADMIN — Medication 10 ML: at 21:07

## 2019-05-11 RX ADMIN — ACETAMINOPHEN 650 MG: 650 SUPPOSITORY RECTAL at 17:13

## 2019-05-11 RX ADMIN — SODIUM CHLORIDE: 9 INJECTION, SOLUTION INTRAVENOUS at 14:47

## 2019-05-11 RX ADMIN — FAMOTIDINE 20 MG: 10 INJECTION, SOLUTION INTRAVENOUS at 21:07

## 2019-05-11 RX ADMIN — ONDANSETRON 4 MG: 2 INJECTION INTRAMUSCULAR; INTRAVENOUS at 11:59

## 2019-05-11 RX ADMIN — ENOXAPARIN SODIUM 40 MG: 40 INJECTION SUBCUTANEOUS at 11:01

## 2019-05-11 RX ADMIN — SODIUM CHLORIDE, SODIUM LACTATE, POTASSIUM CHLORIDE, CALCIUM CHLORIDE AND DEXTROSE MONOHYDRATE: 5; 600; 310; 30; 20 INJECTION, SOLUTION INTRAVENOUS at 17:10

## 2019-05-11 ASSESSMENT — PAIN SCALES - GENERAL
PAINLEVEL_OUTOF10: 4
PAINLEVEL_OUTOF10: 0

## 2019-05-11 ASSESSMENT — PAIN DESCRIPTION - PAIN TYPE: TYPE: ACUTE PAIN

## 2019-05-11 ASSESSMENT — PAIN DESCRIPTION - LOCATION: LOCATION: BACK

## 2019-05-11 ASSESSMENT — PAIN DESCRIPTION - ORIENTATION: ORIENTATION: LOWER

## 2019-05-11 ASSESSMENT — PAIN DESCRIPTION - PROGRESSION: CLINICAL_PROGRESSION: GRADUALLY WORSENING

## 2019-05-11 ASSESSMENT — PAIN DESCRIPTION - DESCRIPTORS: DESCRIPTORS: SORE

## 2019-05-11 NOTE — PLAN OF CARE
Problem: Falls - Risk of:  Goal: Will remain free from falls  Description  Will remain free from falls  5/10/2019 2327 by Deepak Quach RN  Outcome: Ongoing  Note:   Patient absent of falls this shift. Bed in lowest position, side rails up 2/4. Bed alarm remains on. Call-light within reach. Patient instructed to use call-light for assistance. Problem: Risk for Impaired Skin Integrity  Goal: Tissue integrity - skin and mucous membranes  Description  Structural intactness and normal physiological function of skin and  mucous membranes. 5/10/2019 2327 by Deepak Quach RN  Outcome: Ongoing  Note:   No new skin breakdown noted this shift. Patient assisted to turn and reposition every 2 hours in bed. External catheter in place to prevent skin breakdown from incontinence. Problem: Nausea/Vomiting:  Goal: Absence of nausea/vomiting  Description  Absence of nausea/vomiting  5/10/2019 2327 by Deepak Quach RN  Outcome: Ongoing  Note:   Patient denies nausea at this time. No emesis this shift. Problem: Nutrition  Goal: Optimal nutrition therapy  Outcome: Ongoing  Note:   Bowel sounds active x4. Patient denies nausea/vomiting. No abdominal distention. Abdomen soft, non-tender. Meal intakes documented as consumed this day. Denies poor appetite. Problem: DISCHARGE BARRIERS  Goal: Patient's continuum of care needs are met  5/10/2019 2327 by Deepak Quach RN  Outcome: Ongoing  Note:   Discharge planning remains in progress. Patient from home with  and home health. Patient and family agreeable to skilled nursing facility at discharge. Problem: Pain:  Goal: Pain level will decrease  Description  Pain level will decrease  5/10/2019 2327 by Deepak Quach RN  Outcome: Ongoing  Note:   Patient able to use 0-10 pain scale. Denies pain at this time. Pain complaints as documented. Agreeable to take PRN pain medications if needed. Pain goal of \"No pain\".       Care plan reviewed with patient and patient's . Patient and  verbalize understanding of the plan of care and contribute to goal setting.

## 2019-05-11 NOTE — PROGRESS NOTES
Patient incontinent with chucks pad and bed sheet saturated. Patient upset with staff cleaning her up at this time. Patient informed of the risk of further skin breakdown from moisture.

## 2019-05-11 NOTE — PROGRESS NOTES
1230 Patient was given her Norvasc this afternoon. This RN placed the pill on patient's tongue and proceeded to have patient swallow a spoonful amount of honey thick water. Patient then proceeded to gag on pill. This RN the had patient open up her mouth and this RN took the pill off of her tongue. This caused the patient to gag once again. This RN then told patient and patient's sister in law that this RN did not feel comfortable with the patient continuing to take anything oral until speech therapy could evaluate. The patient's sister in law agreed. This RN then proceeded to call speech therapy. They state understanding of the need for re-evaluation and they will come and see the patient this afternoon.

## 2019-05-11 NOTE — PROGRESS NOTES
[address] THERAP[facility]  [facility] NEUROSCIENCES 4A    TIME   SLP Individual Minutes  Time In: 6579  Time Out: 8152  Minutes: 20  Timed Code Treatment Minutes: 0 Minutes       [x]Daily Note  []Progress Note  []Discharge Note    Date: 2019  Patient Name: Connor Mckinnon        MRN: [id number]    : 1945  (77 y.o.)  Gender: female   Primary Provider: Edilson Castro MD  Admitting Diagnosis:  Ureteral stone with hydronephrosis  Secondary Diagnosis: dysphagia   Precautions: aspiration risk; fall risk   Swallowing Status/Diet: Downgrade to NPO on   Swallowing Strategies: Oral care  DATE of last BSE: 19  Pain:  Pt not verbalizing pain. Subjective: RNCharley, requesting a re-evaluation due to decline in swallow function this date. Pt seen at bedside with  and caregiver present. Pt would open eyes to verbal prompts and attempt to follow commands to open mouth and lateralize tongue, but with significant weakness and labored attempts. SHORT TERM GOAL #1:  Goal 1: Pt will tolerate dysphagia II diet with nectar thick liquids (NO straws) without overt s/s of aspiration to meet nutrition and hydration needs safely. INTERVENTIONS: Attempted ice chip trials x1, however patient unable to engage in oral cavity opening. Ice chip remained in anterior sulci and eventually fell anteriorly out of oral cavity. Attempted an 1/8 tsp of applesauce which was placed on the anterior lingual dorsum. Pt engaged in labial closure, but was not able to initiate posterior bolus movement or swallow reflex. NO swallow initiated after 45 seconds, requiring therapist to remove bolus from oral cavity with a toothette. Pt with EXTREMELY sensitive gag reflex, resulting in gagging/dry heaving episode. Educated  and caregiver on findings and recommendations that patient refrain from eating by mouth as she is unable to elicit a swallow response and is therefore at [address] risk for aspiration.  Family treatment by a licensed therapist to address functional deficits as outlined in the established plan of care. Julieta Cloud.  [address] [facility], [facility] [[email or web address]] [address], [address]

## 2019-05-11 NOTE — PROGRESS NOTES
05/10/19  0320 05/11/19  0511    143 142   K 4.0 3.9 3.5    109 107   CO2 25 24 25   BUN 7 7 11   CREATININE 0.8 0.6 0.5   CALCIUM 8.5 8.0* 8.5     No results for input(s): AST, ALT, BILIDIR, BILITOT, ALKPHOS in the last 72 hours. No results for input(s): INR in the last 72 hours. No results for input(s): Jamey Milch in the last 72 hours. Microbiology:      Urinalysis:      Lab Results   Component Value Date    NITRU NEGATIVE 05/09/2019    WBCUA > 200 05/09/2019    BACTERIA FEW 05/09/2019    RBCUA 5-10 05/09/2019    BLOODU MODERATE 05/09/2019    GLUCOSEU NEGATIVE 05/09/2019       Radiology:  XR CHEST PORTABLE   Final Result   1. Mild increased perihilar interstitial infiltrates bilaterally. This may be related to mild interstitial edema. 2. Small right-sided pleural effusion. This appears slightly improved from the prior examination. 3. Multifocal patchy mild airspace opacities at the medial right upper lung zone and lateral right lung base may represent mild atelectasis or pneumonia. This appears improved at the right lung base but slightly worsened at the medial right upper lung    zone. 4. Recommend short-term follow-up radiographic evaluation. If findings persist, further characterization could be obtained with chest CT. **This report has been created using voice recognition software. It may contain minor errors which are inherent in voice recognition technology. **      Final report electronically signed by Dr. Erich Mack on 5/9/2019 3:54 PM      XR COMPARISON OF OUTSIDE FILMS   Final Result      CT COMPARISON OF OUTSIDE FILMS   Final Result          DVT prophylaxis: [x] Lovenox                                 [] SCDs                                 [] SQ Heparin                                 [] Encourage ambulation           [] Already on Anticoagulation     Code Status: Full Code        Tele:   [x] yes             [] no    Active Hospital Problems    Diagnosis Date Noted    [facility] [E43] 05/09/2019     Class: Acute    Hydronephrosis with ureteral calculus [N92.8]     Renal colic on right side [R11]     Ureteral stone [N20.1] 05/08/2019    Hypomagnesemia [E83.42]     Hypokalemia [E87.6]     Discitis [M46.40]     History of stroke with residual deficit [I69.30]     Type 2 diabetes mellitus with stage 3 chronic kidney disease, with long-term current use of insulin ([facility] [M45.38, N18.3, Z79.4] 04/04/2019       Electronically signed by Ron Ames MD on 5/11/2019 at 3:48 PM

## 2019-05-12 LAB
GLUCOSE BLD-MCNC: 105 MG/DL (ref 70–108)
GLUCOSE BLD-MCNC: 118 MG/DL (ref 70–108)
GLUCOSE BLD-MCNC: 92 MG/DL (ref 70–108)
GLUCOSE BLD-MCNC: 96 MG/DL (ref 70–108)

## 2019-05-12 PROCEDURE — 2709999900 HC NON-CHARGEABLE SUPPLY

## 2019-05-12 PROCEDURE — 82948 REAGENT STRIP/BLOOD GLUCOSE: CPT

## 2019-05-12 PROCEDURE — 6360000002 HC RX W HCPCS: Performed by: INTERNAL MEDICINE

## 2019-05-12 PROCEDURE — 6360000002 HC RX W HCPCS: Performed by: NURSE PRACTITIONER

## 2019-05-12 PROCEDURE — 2580000003 HC RX 258: Performed by: INTERNAL MEDICINE

## 2019-05-12 PROCEDURE — 99232 SBSQ HOSP IP/OBS MODERATE 35: CPT | Performed by: HOSPITALIST

## 2019-05-12 PROCEDURE — 6360000002 HC RX W HCPCS: Performed by: PHYSICIAN ASSISTANT

## 2019-05-12 PROCEDURE — 2580000003 HC RX 258: Performed by: NURSE PRACTITIONER

## 2019-05-12 PROCEDURE — 2500000003 HC RX 250 WO HCPCS: Performed by: NURSE PRACTITIONER

## 2019-05-12 PROCEDURE — 2580000003 HC RX 258: Performed by: HOSPITALIST

## 2019-05-12 PROCEDURE — 1200000003 HC TELEMETRY R&B

## 2019-05-12 PROCEDURE — 6370000000 HC RX 637 (ALT 250 FOR IP): Performed by: NURSE PRACTITIONER

## 2019-05-12 RX ORDER — HYDRALAZINE HYDROCHLORIDE 20 MG/ML
10 INJECTION INTRAMUSCULAR; INTRAVENOUS EVERY 6 HOURS PRN
Status: DISCONTINUED | OUTPATIENT
Start: 2019-05-12 | End: 2019-05-13

## 2019-05-12 RX ORDER — ENALAPRILAT 2.5 MG/2ML
1.25 INJECTION INTRAVENOUS EVERY 6 HOURS PRN
Status: DISCONTINUED | OUTPATIENT
Start: 2019-05-12 | End: 2019-05-12

## 2019-05-12 RX ADMIN — MICONAZOLE NITRATE: 20 POWDER TOPICAL at 21:01

## 2019-05-12 RX ADMIN — CEFTRIAXONE SODIUM 2 G: 2 INJECTION, POWDER, FOR SOLUTION INTRAMUSCULAR; INTRAVENOUS at 12:17

## 2019-05-12 RX ADMIN — ONDANSETRON 4 MG: 2 INJECTION INTRAMUSCULAR; INTRAVENOUS at 09:36

## 2019-05-12 RX ADMIN — Medication 10 ML: at 09:36

## 2019-05-12 RX ADMIN — SODIUM CHLORIDE, SODIUM LACTATE, POTASSIUM CHLORIDE, CALCIUM CHLORIDE AND DEXTROSE MONOHYDRATE: 5; 600; 310; 30; 20 INJECTION, SOLUTION INTRAVENOUS at 03:08

## 2019-05-12 RX ADMIN — SODIUM CHLORIDE, SODIUM LACTATE, POTASSIUM CHLORIDE, CALCIUM CHLORIDE AND DEXTROSE MONOHYDRATE: 5; 600; 310; 30; 20 INJECTION, SOLUTION INTRAVENOUS at 23:35

## 2019-05-12 RX ADMIN — FAMOTIDINE 20 MG: 10 INJECTION, SOLUTION INTRAVENOUS at 12:18

## 2019-05-12 RX ADMIN — SODIUM CHLORIDE, SODIUM LACTATE, POTASSIUM CHLORIDE, CALCIUM CHLORIDE AND DEXTROSE MONOHYDRATE: 5; 600; 310; 30; 20 INJECTION, SOLUTION INTRAVENOUS at 13:32

## 2019-05-12 RX ADMIN — FAMOTIDINE 20 MG: 10 INJECTION, SOLUTION INTRAVENOUS at 21:39

## 2019-05-12 RX ADMIN — ENOXAPARIN SODIUM 40 MG: 40 INJECTION SUBCUTANEOUS at 12:18

## 2019-05-12 RX ADMIN — MICONAZOLE NITRATE: 20 POWDER TOPICAL at 09:36

## 2019-05-12 RX ADMIN — ACETAMINOPHEN 650 MG: 650 SUPPOSITORY RECTAL at 09:36

## 2019-05-12 RX ADMIN — HYDRALAZINE HYDROCHLORIDE 10 MG: 20 INJECTION INTRAMUSCULAR; INTRAVENOUS at 22:12

## 2019-05-12 ASSESSMENT — PAIN SCALES - GENERAL
PAINLEVEL_OUTOF10: 0
PAINLEVEL_OUTOF10: 0

## 2019-05-12 NOTE — BRIEF OP NOTE
Brief Postoperative Note  ______________________________________________________________    Patient: Vinita Wilson  YOB: 1945  MRN: [id number]  Date of Procedure: 5/9/2019    Pre-Op Diagnosis: right ureteral stone with obstruction, right renal stones    Post-Op Diagnosis: same       Procedure(s):  CYSTO, REMOVAL OF BLADDER STONES, RIGHT URETEROSCOPY, BASKET RETRIEVAL OF RIGHT URETERAL STONES, RIGHT URETERORENOSCOPY, BASKET RETRIEVAL OF RENAL STONES AND PLACEMENT RIGHT URETERAL STENT    Anesthesia: General    Surgeon(s):  Chirag Douglas MD    Assistant: none    Estimated Blood Loss (mL): 5 cc    Complications: none    Specimens:   ID Type Source Tests Collected by Time Destination   A : RIGHT URETERAL STONE Stone (Calculus) Ureter SURGICAL PATHOLOGY, STONE ANALYSIS Chirag Douglas MD 5/9/2019 8928        Implants:  Implant Name Type Inv.  Item Serial No.  Lot No. LRB No. Used   STENT URET DBL PIGTL MULTI 6FR [id number] Stent:Urological STENT URET DBL PIGTL MULTI 6FR [id number]  [employer]: INTERVENTIONAL CARDIO  Right 1         Drains:   External Urinary Catheter (Active)   Urine Color Becka 5/11/2019  5:10 AM   Urine Appearance Clear 5/11/2019  5:10 AM   Output (mL) 50 mL 5/11/2019  5:10 AM   Suction- Female Only 40 mmgHg continuous 5/10/2019  8:48 PM   Placement Replaced 5/11/2019  1:11 AM   Skin Assessment Other 5/10/2019  8:48 PM       [REMOVED] External Urinary Catheter (Removed)   Urine Color Other (Comment) 4/13/2019  9:33 AM   Urine Appearance Hazy 4/5/2019  4:58 AM   Output (mL) 150 mL 4/5/2019  2:25 PM       [REMOVED] External Urinary Catheter (Removed)   Urine Color Yellow 5/9/2019  2:40 PM   Urine Appearance Cloudy 5/9/2019  2:40 PM   Output (mL) 125 mL 5/9/2019  2:40 PM   Suction- Female Only 40 mmgHg continuous 5/9/2019  2:40 PM   Placement Initiated 5/9/2019  4:00 AM   Skin Assessment Injury-See Skin Assessment 5/9/2019  2:40 PM       Findings: stones in

## 2019-05-12 NOTE — PROGRESS NOTES
Татьяна here to speak w/ spouse and pt about additional care at home and/or hospice. Hospice RN will come to see pt and spouse.

## 2019-05-12 NOTE — PROGRESS NOTES
In room to turn patient. Patient appears more alert than earlier in the shift. She is able to tell me her name, but disoriented to place and time. This is the first time she has spoken this shift. Patient turned. Denies any concerns at this time.  Will continue to monitor

## 2019-05-12 NOTE — INTERVAL H&P NOTE
[address] [address]  History and Physical Update    Pt Name: Connor Mckinnon  MRN: [id number]  YOB: 1945  Date of evaluation: 5/12/2019    [] I have examined the patient and reviewed the H&P/Consult and there are no changes to the patient or plans. [x] I have examined the patient and reviewed the H&P/Consult and have noted the following changes: No changes per patient.         Marizol Rossi MD  Electronically signed 5/12/2019 at 7:40 PM

## 2019-05-12 NOTE — OP NOTE
Regla Foley  RECORD OF OPERATION     PATIENT NAME: [facility] RECORD NO. [id number]                DATE OF PROCEDURE: 05/09/2019  SURGEON: Magy Rivera MD  PRIMARY CARE PHYSICIAN: Eleazar Draper MD        PREOPERATIVE DIAGNOSIS: right ureteral stone with obstruction and right renal stones     POSTOPERATIVE DIAGNOSIS: same, also with bladder stones     PROCEDURE PERFORMED: cystoscopy with removal of bladder stones, right ureteroscopy, basket retrieval of right ureteral stones, right ureterorenoscopy, basket retrieval of right renal stones and placement of right ureteral stent     SURGEON: Magy Rivera MD    ASSISTANT(S): none     ANESTHESIA: general     BLOOD LOSS:  5 cc     SPECIMENS: stones for analysis     COMPLICATIONS:  None immediately appreciated. DISCUSSION:  Sawyer Baker is a 72y.o.-year-old female who has a diagnosis of right ureteral and right renal stones. After a history and physical examination was performed, potential diagnostic and therapeutic modalities were discussed with the patient. Right ureteroscopy and right ureterorenoscopy was recommended and a discussion of the risks, possible complications, possible side effects, along with the anticipated benefits were reviewed. She was given the opportunity to ask questions. Once answered, informed consent was obtained. She was brought to the operating on 05/09/2019 for this procedure. PROCEDURE: Sawyer Baker was taken to the OR and transferred to the operating room table. After initiation of general anesthesia the patient was placed in lithotomy position for cystoscopy. Her groin was prepped and draped in the standard fashion for cystoscopy. A 22 [place] cystoscope was passed per urethra and bladder stones were found. The first portion of the procedure was spent removing the bladder stones through the cystoscope. The right ureteral orifice was then seen.   A dual-flex wire was passed

## 2019-05-12 NOTE — PROGRESS NOTES
2050: Assessment completed. Patient only opening eyes slightly to voices. Not following commands at this time. Patient turned and oral care completed.  at bedside. Denied questions or further concerns. Will continue to monitor.

## 2019-05-12 NOTE — PROGRESS NOTES
This staff met with pt and her  and sister in law for palliative care and hospice conversation. Pt had a stroke in 2016 and has been on and off with home care since. She has a criss lift and a hospital bed and table already in the home,  providing for her care. The couple has been  for 50 years, having no children. Pt spent time in [facility] previously, being there for a skilled medicare benefit. Pt denies a desire for artificial feeding, does desire CPR. Pt and her  would like to go home, with hospice she qualifies. Pt does have a procedure scheduled - a coil to be removed from her kidney stones. Hospice nursing staff to visit pt tomorrow to determine eligibility. Staff offered support and will remain available.

## 2019-05-12 NOTE — H&P
Urology Consult Note        Reason for Consult:  \"8mm R ureteral stone\"  Requesting Physician:  Dr. Gema Moura     History Obtained From:  patient, spouse, electronic medical record, nurse     Chief Complaint: Nausea and vomiting     HISTORY OF PRESENT ILLNESS:                 The patient is a 72 y.o. female with significant past medical history of stroke with R sided weakness, bacteremia and discitis/osteomyelitis of the thoracic spine--biopsy grew proteus on IV antibiotic therapy outpatient, recurrent UTIs who presented to [facility] secondary to R sided back pain and recurrent nausea and vomiting. Pt's  reports she has been having right sided back pain for 2 weeks time and then started with nausea and vomiting that has been every 15 minutes at time. CT at outside facility reportedly shows a right sided 8 mm proximal ureteral stone with hydronephrosis. Images and report not available for review at this time. [facility] is pushing them through PACs system. WBC 16.7 on adm down to 14.6 today. Creatinine 0.8.       Pt reports nausea. No pain currently. No chest pain, sob.  reports she normally urinates on her own. Denies hx of incontinence or urinary retention.  Pt has an external catheter in place at this time.       Past Medical History:    Past Medical History        Diagnosis Date    Cerebral artery occlusion with cerebral infarction ([address]       Right sided weakness    Chronic kidney disease      Diabetes mellitus ([address]      Discitis      GERD (gastroesophageal reflux disease)       Dysphagia/GERD    Hypertension           Past Surgical History:    Past Surgical History             Procedure Laterality Date    HYSTERECTOMY                Social History               Socioeconomic History    Marital status:        Spouse name: Not on file    Number of children: Not on file    Years of education: Not on file    Highest education level: Not on file   Occupational History    Not on file   Social Needs    Financial resource strain: Not on file    Food insecurity:       Worry: Not on file       Inability: Not on file    Transportation needs:       Medical: Not on file       Non-medical: Not on file   Tobacco Use    Smoking status: Never Smoker    Smokeless tobacco: Never Used   Substance and Sexual Activity    Alcohol use: Never       Frequency: Never    Drug use: Never    Sexual activity: Not on file   Lifestyle    Physical activity:       Days per week: Not on file       Minutes per session: Not on file    Stress: Not on file   Relationships    Social connections:       Talks on phone: Not on file       Gets together: Not on file       Attends Yazidi service: Not on file       Active member of club or organization: Not on file       Attends meetings of clubs or organizations: Not on file       Relationship status: Not on file    Intimate partner violence:       Fear of current or ex partner: Not on file       Emotionally abused: Not on file       Physically abused: Not on file       Forced sexual activity: Not on file   Other Topics Concern    Not on file   Social History Narrative    Not on file            AL  Family History   History reviewed. No pertinent family history.        Allergies:  No Known Allergies     ROS:  Constitutional: Negative for chills, fatigue, fever, or weight loss. Eyes: Denies reported visual changes. ENT: Denies headache, difficulty swallowing, nose bleeds, ringing in ears, or earaches. Cardiovascular: Negative for chest pain, palpitations, tachycardia or edema. Respiratory: Denies cough or SOB. GI:  See HPI  : See HPI  Musculoskeletal: Patient denies low back pain or painful or reduced ROM of the back or extremities. Neurological: The patient denies any symptoms of neurological impairment or               TIA's; no history of stroke. Lymphatic: Denies swollen glands in neck, axillary or inguinal areas.   Psychiatric: Denies anxiety or depression. Skin: Denies rash or lesions. The remainder of the complete ROS is negative     PHYSICAL EXAM:  VITALS:  BP (!) 119/57   Pulse 94   Temp 98.6 °F (37 °C) (Oral)   Resp 16   Ht 5' 3\" (1.6 m)   Wt 177 lb 6.4 oz (80.5 kg)   LMP  (LMP Unknown) Comment: post menopase  SpO2 93%   BMI 31.42 kg/m² . Nursing note and vitals reviewed. Constitutional:               Alert and oriented to person, place, time, no acute distress and cooperative to examination with appropriate mood and affect. HEENT:   Head:               Normocephalic and atraumatic. Mouth/Throat:                Mucous membranes are normal.   Eyes:               EOM are normal. No scleral icterus. Nose:               The external appearance of the nose is normal  Ears: The ears appear normal to external inspection   Neck:               Supple, symmetrical, trachea midline, no adenopathy, thyroid symmetric, not enlarged and no tenderness. Cardiovascular:               Normal rate, regular rhythm, S1 S2 heart sounds. Pulmonary/Chest:              Lungs diminished throughout  Abdominal:               Soft. No tenderness. Active bowel sounds. Genitalia: Pt has redness and skin breakdown to labia and groin. Musculoskeletal:               Normal range of motion. She exhibits no edema or tenderness of lower extremities. Extremities:               Bilateral lower extremity edema  Neurological:               Alert and oriented.  R sided weakness, bilateral lower extremity weakness R >L  DATA:  CBC:         Lab Results   Component Value Date     WBC 14.6 05/09/2019     RBC 3.52 05/09/2019     HGB 9.1 05/09/2019     HCT 29.1 05/09/2019     MCV 82.7 05/09/2019     MCH 25.9 05/09/2019     MCHC 31.3 05/09/2019      05/09/2019     MPV 9.8 05/09/2019      BMP:          Lab Results   Component Value Date      05/09/2019     K 4.0 05/09/2019      05/09/2019     CO2 25 05/09/2019     BUN 7 05/09/2019     possible right ureterorenoscopy, possible laser lithotripsy, possible basket retrieval of stone fragments, possible right ureteral stent placement by Dr. Candis Chaney 5/9/2019      Check CXR prior--already ordered by ID. Pt having some coughing after taking meds. Recent effusion.     Discussed with pt, spouse, Binta Enriquez RN, Dr. Mary Cee, and Dr. Jay Scott.     Thank you for including us in the care of [address], APRN-CNP  05/09/19 8:35 AM  Urology     _____________________________________________________________________     I have seen and examined the patient independently on 05/09/2019 and I have reviewed all radiology reports and films along with pertinent laboratory values. I agree with the above assessment and plan with the following additions:        Review of Systems  No problems with ears, nose or throat. No problems with eyes. No chest pain, shortness of breath, abdominal pain, extremity pain or weakness, and no neurological deficits. No rashes. No swollen glands or lymph nodes.  symptoms per HPI. The remainder of the review of symptoms is negative.        Exam  General: alert and oriented. Cooperative. HENT: Normocephalic, Atraumatic. Eyes: No scleral icterus, mucous membranes moist.  Respiratory: Effort normal.   Skin: No rashes or obvious lesions.     Radiology  The patient has had a CT scan that I have reviewed along with its accompanying report. The study demonstrates a stone in the proximal right ureter with additional stones in the right kidney        Plan:  My plan will be to schedule cytoscopy with right ureteroscopy and right ureterorenoscopy, possible laser lithotripsy, possible basket retrieval of stone fragments and possible placement of a ureteral stent.      I described the procedure in detail and also described the associated risks and benefits at length. We discussed possible alternative therapies.   We discussed the risks and benefits of not undergoing therapy. Montey Goodell understands these risks and benefits and desires to proceed.   She will be scheduled for the procedure later today.         Thank you for the opportunity of allowing us to participate in Verenice's care.        Rey Velasco     [phone]

## 2019-05-12 NOTE — PROGRESS NOTES
Hospitalist Progress Note    Patient:  Stephy Showers      Unit/Bed:4A-08/008-A    YOB: 1945    MRN: [id number]       Acct: [de-identified]     PCP: Jayne Cee MD    Date of Admission: 5/8/2019    Assessment/Plan:    71 y/o female w/ h/o dementia, discitis, and right ureteral stone is admitted for hydronephrosis.    1) Hydronephrosis: S/p cystoscopy and right ureteroscopy and a renal scope E and basket retrieval of stones with removal of bladder stones and right ureteral stent was placed on 5/9/19  May need stent remove. Followed by urology. Supportive care.     2) Dysphagia: Failed swallow study per speech. Strict NPO. Probably will not tolerate peg / NGT given condition. She does not desire artifical feeding per . The two have discussed end of life care years ago.     3) T 8-9 discitis:  C/w ceftriaxone.    4) Goal of care: Will consider with current care. Likely can probably de-escalate care tomorrow to focus on comfort after evaluation by hospice team.  Will change code status at that time. Expected discharge date:  tomorrow    Disposition:    [x] Home       [] TCU       [] Rehab       [] Psych       [] SNF       [] [facility]       [] Other-      Subjective (past 24 hours):   Persistent n/v. Reaffirmed desire for no artifical feeding.       Medications:  Reviewed    Infusion Medications    dextrose 5% in lactated ringers 100 mL/hr at 05/12/19 1332    dextrose       Scheduled Medications    scopolamine  1 patch Transdermal Q72H    cefTRIAXone (ROCEPHIN) IV  2 g Intravenous Q24H    docusate sodium  100 mg Oral Daily    senna  1 tablet Oral Nightly    magnesium oxide  400 mg Oral Daily    famotidine (PEPCID) injection  20 mg Intravenous BID    miconazole   Topical BID    magnesium replacement protocol   Other RX Placeholder    potassium (CARDIAC) replacement protocol   Other RX Placeholder    amLODIPine  5 mg Oral Daily    citalopram  10 mg Oral Daily    lisinopril  20 mg Oral BID    metoprolol tartrate  50 mg Oral BID    insulin glargine  20 Units Subcutaneous BID    sodium chloride flush  10 mL Intravenous 2 times per day    enoxaparin  40 mg Subcutaneous Daily    insulin lispro  0-6 Units Subcutaneous TID     insulin lispro  0-3 Units Subcutaneous Nightly     PRN Meds: zinc oxide, HYDROcodone 5 mg - acetaminophen **OR** HYDROcodone 5 mg - acetaminophen, promethazine, ondansetron, sodium chloride flush, magnesium hydroxide, metoclopramide, acetaminophen, glucose, dextrose, glucagon (rDNA), dextrose      Intake/Output Summary (Last 24 hours) at 5/12/2019 1640  Last data filed at 5/11/2019 2236  Gross per 24 hour   Intake 1409 ml   Output --   Net 1409 ml       Diet:  Diet NPO Effective Now    Exam:  /66   Pulse 67   Temp 98.4 °F (36.9 °C) (Oral)   Resp 18   Ht 5' 3\" (1.6 m)   Wt 180 lb 11.2 oz (82 kg)   LMP  (LMP Unknown) Comment: post menopase  SpO2 100%   BMI 32.01 kg/m²     General appearance: Ill-appearing, follow one step command  HEENT: Pupils equal, round, and reactive to light. Conjunctivae/corneas clear. Neck: Supple, with full range of motion. No jugular venous distention. Trachea midline. Respiratory:  Coarse b/s, no crackle. Cardiovascular: Regular rate and rhythm with normal S1/S2 without murmurs, rubs or gallops. Abdomen: Soft, non-tender, non-distended with normal bowel sounds. Musculoskeletal: passive and active ROM x 4 extremities. Skin: Skin color, texture, turgor normal.  No rashes or lesions.   Capillary Refill: Brisk,< 3 seconds   Peripheral Pulses: +2 palpable, equal bilaterally       Labs:   Recent Labs     05/10/19  0320 05/11/19  0511   WBC 11.3* 8.4   HGB 8.2* 8.5*   HCT 27.3* 28.2*    230     Recent Labs     05/10/19  0320 05/11/19  0511    142   K 3.9 3.5    107   CO2 24 25   BUN 7 11   CREATININE 0.6 0.5   CALCIUM 8.0* 8.5     No results for input(s): AST, ALT, BILIDIR, BILITOT, ALKPHOS in the last 72 hours. No results for input(s): INR in the last 72 hours. No results for input(s): Florence Reasoner in the last 72 hours. Microbiology:      Urinalysis:      Lab Results   Component Value Date    NITRU NEGATIVE 05/09/2019    WBCUA > 200 05/09/2019    BACTERIA FEW 05/09/2019    RBCUA 5-10 05/09/2019    BLOODU MODERATE 05/09/2019    GLUCOSEU NEGATIVE 05/09/2019       Radiology:  XR CHEST PORTABLE   Final Result   1. Mild increased perihilar interstitial infiltrates bilaterally. This may be related to mild interstitial edema. 2. Small right-sided pleural effusion. This appears slightly improved from the prior examination. 3. Multifocal patchy mild airspace opacities at the medial right upper lung zone and lateral right lung base may represent mild atelectasis or pneumonia. This appears improved at the right lung base but slightly worsened at the medial right upper lung    zone. 4. Recommend short-term follow-up radiographic evaluation. If findings persist, further characterization could be obtained with chest CT. **This report has been created using voice recognition software. It may contain minor errors which are inherent in voice recognition technology. **      Final report electronically signed by Dr. Bunny Cavazos on 5/9/2019 3:54 PM      XR COMPARISON OF OUTSIDE FILMS   Final Result      CT COMPARISON OF OUTSIDE FILMS   Final Result          DVT prophylaxis: [x] Lovenox                                 [] SCDs                                 [] SQ Heparin                                 [] Encourage ambulation           [] Already on Anticoagulation     Code Status: Full Code    PT/OT Eval Status: Unlikely to benefit from PT/OT    Tele:   [x] yes             [] no    Active Hospital Problems    Diagnosis Date Noted    Severe malnutrition ([facility] [E43] 05/09/2019     Class: Acute    Hydronephrosis with ureteral calculus [Y75.8]     Renal colic on right side [N23]     Ureteral stone [N20.1] 05/08/2019    Hypomagnesemia [E83.42]     Hypokalemia [E87.6]     Discitis [M46.40]     History of stroke with residual deficit [I69.30]     Type 2 diabetes mellitus with stage 3 chronic kidney disease, with long-term current use of insulin ([facility] [E11.22, N18.3, Z79.4] 04/04/2019       Electronically signed by Eladio Loya MD on 5/12/2019 at 4:40 PM

## 2019-05-13 LAB
AMORPHOUS: ABNORMAL
BACTERIA: ABNORMAL
BASOPHILS # BLD: 0.2 %
BASOPHILS ABSOLUTE: 0 THOU/MM3 (ref 0–0.1)
BILIRUBIN URINE: NEGATIVE
BLOOD, URINE: ABNORMAL
CASTS: ABNORMAL /LPF
CHARACTER, URINE: ABNORMAL
COLOR: ABNORMAL
CRYSTALS: ABNORMAL
EOSINOPHIL # BLD: 2.3 %
EOSINOPHILS ABSOLUTE: 0.1 THOU/MM3 (ref 0–0.4)
EPITHELIAL CELLS, UA: ABNORMAL /HPF
ERYTHROCYTE [DISTWIDTH] IN BLOOD BY AUTOMATED COUNT: 18.7 % (ref 11.5–14.5)
ERYTHROCYTE [DISTWIDTH] IN BLOOD BY AUTOMATED COUNT: 56.7 FL (ref 35–45)
GLUCOSE BLD-MCNC: 127 MG/DL (ref 70–108)
GLUCOSE BLD-MCNC: 131 MG/DL (ref 70–108)
GLUCOSE BLD-MCNC: 138 MG/DL (ref 70–108)
GLUCOSE BLD-MCNC: 145 MG/DL (ref 70–108)
GLUCOSE, URINE: NEGATIVE MG/DL
HCT VFR BLD CALC: 25.5 % (ref 37–47)
HCT VFR BLD CALC: 25.8 % (ref 37–47)
HEMOGLOBIN: 7.8 GM/DL (ref 12–16)
HEMOGLOBIN: 8 GM/DL (ref 12–16)
IMMATURE GRANS (ABS): 0.07 THOU/MM3 (ref 0–0.07)
IMMATURE GRANULOCYTES: 1.2 %
KETONES, URINE: NEGATIVE
LEUKOCYTE ESTERASE, URINE: ABNORMAL
LYMPHOCYTES # BLD: 21.4 %
LYMPHOCYTES ABSOLUTE: 1.2 THOU/MM3 (ref 1–4.8)
MCH RBC QN AUTO: 25.4 PG (ref 26–33)
MCHC RBC AUTO-ENTMCNC: 30.6 GM/DL (ref 32.2–35.5)
MCV RBC AUTO: 83.1 FL (ref 81–99)
MONOCYTES # BLD: 9.2 %
MONOCYTES ABSOLUTE: 0.5 THOU/MM3 (ref 0.4–1.3)
MUCUS: ABNORMAL
NITRITE, URINE: NEGATIVE
NUCLEATED RED BLOOD CELLS: 0 /100 WBC
ORGANISM: ABNORMAL
PH UA: 6 (ref 5–9)
PLATELET # BLD: 147 THOU/MM3 (ref 130–400)
PMV BLD AUTO: 9.4 FL (ref 9.4–12.4)
PROTEIN UA: 30 MG/DL
RBC # BLD: 3.07 MILL/MM3 (ref 4.2–5.4)
RBC URINE: ABNORMAL /HPF
SEG NEUTROPHILS: 65.7 %
SEGMENTED NEUTROPHILS ABSOLUTE COUNT: 3.8 THOU/MM3 (ref 1.8–7.7)
SPECIFIC GRAVITY UA: 1.02 (ref 1–1.03)
URINE CULTURE REFLEX: ABNORMAL
URINE CULTURE REFLEX: ABNORMAL
UROBILINOGEN, URINE: 0.2 EU/DL (ref 0–1)
WBC # BLD: 5.8 THOU/MM3 (ref 4.8–10.8)
WBC UA: > 200 /HPF

## 2019-05-13 PROCEDURE — 6360000002 HC RX W HCPCS: Performed by: NURSE PRACTITIONER

## 2019-05-13 PROCEDURE — 99232 SBSQ HOSP IP/OBS MODERATE 35: CPT | Performed by: FAMILY MEDICINE

## 2019-05-13 PROCEDURE — 2709999900 HC NON-CHARGEABLE SUPPLY

## 2019-05-13 PROCEDURE — 1200000003 HC TELEMETRY R&B

## 2019-05-13 PROCEDURE — 2580000003 HC RX 258: Performed by: INTERNAL MEDICINE

## 2019-05-13 PROCEDURE — 6370000000 HC RX 637 (ALT 250 FOR IP): Performed by: FAMILY MEDICINE

## 2019-05-13 PROCEDURE — 97162 PT EVAL MOD COMPLEX 30 MIN: CPT

## 2019-05-13 PROCEDURE — 85025 COMPLETE CBC W/AUTO DIFF WBC: CPT

## 2019-05-13 PROCEDURE — 36415 COLL VENOUS BLD VENIPUNCTURE: CPT

## 2019-05-13 PROCEDURE — 6360000002 HC RX W HCPCS: Performed by: INTERNAL MEDICINE

## 2019-05-13 PROCEDURE — 82948 REAGENT STRIP/BLOOD GLUCOSE: CPT

## 2019-05-13 PROCEDURE — 87086 URINE CULTURE/COLONY COUNT: CPT

## 2019-05-13 PROCEDURE — 85018 HEMOGLOBIN: CPT

## 2019-05-13 PROCEDURE — 2500000003 HC RX 250 WO HCPCS: Performed by: NURSE PRACTITIONER

## 2019-05-13 PROCEDURE — 81001 URINALYSIS AUTO W/SCOPE: CPT

## 2019-05-13 PROCEDURE — 97110 THERAPEUTIC EXERCISES: CPT

## 2019-05-13 PROCEDURE — 85014 HEMATOCRIT: CPT

## 2019-05-13 PROCEDURE — 6360000002 HC RX W HCPCS: Performed by: FAMILY MEDICINE

## 2019-05-13 PROCEDURE — 6360000002 HC RX W HCPCS: Performed by: PHYSICIAN ASSISTANT

## 2019-05-13 RX ORDER — MORPHINE SULFATE 2 MG/ML
1 INJECTION, SOLUTION INTRAMUSCULAR; INTRAVENOUS EVERY 8 HOURS PRN
Status: DISCONTINUED | OUTPATIENT
Start: 2019-05-13 | End: 2019-05-17 | Stop reason: HOSPADM

## 2019-05-13 RX ORDER — HYDRALAZINE HYDROCHLORIDE 20 MG/ML
10 INJECTION INTRAMUSCULAR; INTRAVENOUS EVERY 4 HOURS PRN
Status: DISCONTINUED | OUTPATIENT
Start: 2019-05-13 | End: 2019-05-17 | Stop reason: HOSPADM

## 2019-05-13 RX ORDER — HYDROCHLOROTHIAZIDE 25 MG/1
25 TABLET ORAL DAILY
Status: DISCONTINUED | OUTPATIENT
Start: 2019-05-13 | End: 2019-05-17 | Stop reason: HOSPADM

## 2019-05-13 RX ORDER — HYDRALAZINE HYDROCHLORIDE 20 MG/ML
INJECTION INTRAMUSCULAR; INTRAVENOUS
Status: DISPENSED
Start: 2019-05-13 | End: 2019-05-14

## 2019-05-13 RX ORDER — LIDOCAINE 4 G/G
1 PATCH TOPICAL DAILY
Status: DISCONTINUED | OUTPATIENT
Start: 2019-05-13 | End: 2019-05-17 | Stop reason: HOSPADM

## 2019-05-13 RX ADMIN — ENOXAPARIN SODIUM 40 MG: 40 INJECTION SUBCUTANEOUS at 11:50

## 2019-05-13 RX ADMIN — HYDRALAZINE HYDROCHLORIDE 10 MG: 20 INJECTION INTRAMUSCULAR; INTRAVENOUS at 18:10

## 2019-05-13 RX ADMIN — FAMOTIDINE 20 MG: 10 INJECTION, SOLUTION INTRAVENOUS at 21:05

## 2019-05-13 RX ADMIN — MICONAZOLE NITRATE: 20 POWDER TOPICAL at 11:52

## 2019-05-13 RX ADMIN — METOCLOPRAMIDE 10 MG: 5 INJECTION, SOLUTION INTRAMUSCULAR; INTRAVENOUS at 09:53

## 2019-05-13 RX ADMIN — MICONAZOLE NITRATE: 20 POWDER TOPICAL at 21:03

## 2019-05-13 RX ADMIN — HYDRALAZINE HYDROCHLORIDE 10 MG: 20 INJECTION INTRAMUSCULAR; INTRAVENOUS at 12:24

## 2019-05-13 RX ADMIN — ONDANSETRON 4 MG: 2 INJECTION INTRAMUSCULAR; INTRAVENOUS at 04:13

## 2019-05-13 RX ADMIN — CEFTRIAXONE SODIUM 2 G: 2 INJECTION, POWDER, FOR SOLUTION INTRAMUSCULAR; INTRAVENOUS at 11:50

## 2019-05-13 RX ADMIN — FAMOTIDINE 20 MG: 10 INJECTION, SOLUTION INTRAVENOUS at 11:50

## 2019-05-13 RX ADMIN — ONDANSETRON 4 MG: 2 INJECTION INTRAMUSCULAR; INTRAVENOUS at 21:03

## 2019-05-13 ASSESSMENT — PAIN SCALES - GENERAL
PAINLEVEL_OUTOF10: 0
PAINLEVEL_OUTOF10: 0

## 2019-05-13 NOTE — PROGRESS NOTES
Discussed with patient the GI consult order that was placed. Patient does not wish to have a PEG tube placed. Dr. April Bai updated.

## 2019-05-13 NOTE — CARE COORDINATION
5/13/19, 10:58 AM    DISCHARGE BARRIERS  Unsure if patient will be discharging with hospice or if she will go to [facility] skilled. OLGA spoke with Marika Laughlin at [facility] and explained that decisions are being made regarding hospice care vs continued treatment. She told SW that she can start the precert and if they go hospice it can be stopped. OLGA faxed PT, OT and ST notes to Marika Laughlin at [facility].

## 2019-05-13 NOTE — PROGRESS NOTES
Follow-up visit made to answer questions from referral that was completed on 5/12 by hospice social worker [employer]. Met with patient and spouse in patient's room. Spouse reports he has been caring for the patient at home for the last 2 1/2 years. He currently has home health services with a different agency and has several pieces of medical equipment in the home. Informed that hospice staff comes and goes and does not provide 24-hour care. Explained that we do have volunteer staff, however they are not able to perform any medical tasks. Spouse voices that his sister does come over at times to sit with patient when he needs to go to the store or run other errands. Patient is not able to swallow and is refusing artificial feeding. Reviewed that with hospice patient would be able to have PO intake as tolerated, however she would be at a greater risk for aspiration pneumonia. Patient and spouse voiced understanding. Also, patient currently is receiving iv antibiotics for discitis/osteomyelitis in her spine. Spouse reports patient is \"almost done\" with these and the physician has talked about stopping once discharged from the hospital. Reviewed with patient and spouse that while on hospice we would no longer be doing any labs, scans, x-rays, or treatment. Voiced understanding. Spouse agreeable to [facility] hospice upon. He voices that he would like stent removed prior to discharge so he did does not have to worry about \"bringing her back to the hospital for a procedure\". Discussed that primary nurse Dov Boss has contacted urology and is waiting on a response. Explained that hospice liaison Anny Childs would follow-up with staff and patient in the morning.

## 2019-05-13 NOTE — PROGRESS NOTES
Hospitalist Progress Note    Patient:  Maryse Alexis      Unit/Bed:4A-08/008-A    YOB: 1945    MRN: [id number]       Acct: [de-identified]     PCP: Adarsh Collado MD    Date of Admission: 5/8/2019    Chief Complaint: transferred from [facility] for 8mm ureteral stone with hydronephrosis      Hospital Course:     Please see H/P for details. In summary, this is a 72 y.o. Female, w PMH of  DM 2, discitis, CKD, HTN, was transferred from [facility] for right sided 8mm proximal ureteral stone with hydronephrosis. Patient was admitted under [address] [place] [address] service. Urology was consulted. She had cystoscopy with removal of bladder stones, right ureteroscopy, basket retrieval of right ureteral stones, right ureterorenoscopy, basket retrieval of right renal stones and placement of right ureteral stent on 5/9/19. ID also following for the discitis, currently on rocephin. Palliative care and hospice team consulted on 5/12/19, /POGABY Christian expressed home w hospice. Also pt failed swallow eval, now NPO. Subjective:     Patient seen and examined. Pt denies abd pain. She does report chronic mid back pain, per pt \"it's there\" constant, she cant tell how bad it is. Denies new leg weakness or numbness. She has chronic right leg and right arm weakness due to old stroke. She denies bladder/bowel incontinence. Denies fever, chills, sob, cough.        Medications:  Reviewed    Infusion Medications    dextrose 5% in lactated ringers 100 mL/hr at 05/12/19 2335    dextrose       Scheduled Medications    hydrochlorothiazide  25 mg Oral Daily    scopolamine  1 patch Transdermal Q72H    cefTRIAXone (ROCEPHIN) IV  2 g Intravenous Q24H    docusate sodium  100 mg Oral Daily    senna  1 tablet Oral Nightly    magnesium oxide  400 mg Oral Daily    famotidine (PEPCID) injection  20 mg Intravenous BID    miconazole   Topical BID    magnesium replacement protocol   Other RX Placeholder    potassium (CARDIAC) replacement protocol   Other RX Placeholder    amLODIPine  5 mg Oral Daily    citalopram  10 mg Oral Daily    lisinopril  20 mg Oral BID    metoprolol tartrate  50 mg Oral BID    insulin glargine  20 Units Subcutaneous BID    sodium chloride flush  10 mL Intravenous 2 times per day    enoxaparin  40 mg Subcutaneous Daily    insulin lispro  0-6 Units Subcutaneous TID WC    insulin lispro  0-3 Units Subcutaneous Nightly     PRN Meds: zinc oxide, hydrALAZINE, HYDROcodone 5 mg - acetaminophen **OR** HYDROcodone 5 mg - acetaminophen, promethazine, ondansetron, sodium chloride flush, magnesium hydroxide, metoclopramide, acetaminophen, glucose, dextrose, glucagon (rDNA), dextrose      Intake/Output Summary (Last 24 hours) at 5/13/2019 0805  Last data filed at 5/13/2019 0408  Gross per 24 hour   Intake 3110 ml   Output --   Net 3110 ml       Diet:  Diet NPO Effective Now    Exam:  BP (!) 172/80   Pulse 73   Temp 99.2 °F (37.3 °C) (Oral)   Resp 18   Ht 5' 3\" (1.6 m)   Wt 180 lb 1.6 oz (81.7 kg)   LMP  (LMP Unknown) Comment: post menopase  SpO2 98%   BMI 31.90 kg/m²     General appearance: alert, not in acute distress. HEENT: Pupils equal, round, and reactive to light. Conjunctivae clear. Clear oral mucosa. Neck: Supple, with full range of motion. Respiratory: On 2L O2 via NC. Clear to auscultation, bilaterally without Rales/Wheezes/Rhonchi. Cardiovascular: normal rate, regular rhythm with normal S1/S2 without murmurs, rubs or gallops. Abdomen: Soft, non-tender, non-distended with normal bowel sounds. Musculoskeletal: passive and active ROM x 4 extremities. No back tenderness   Neurological exam reveals alert, oriented x3, normal speech, RUE: 3/5 ( chronic per  due to stroke 2.5 years ago), LUE: 5/5, RLE: 3/5 ( chronic per  due to stroke 2.5 years ago), LLE: 5/5. Intact sensation.    Exam of extremities: B/L pitting pedal edema grade 1 +         Labs: Recent Labs     05/11/19  0511 05/13/19  0416   WBC 8.4 5.8   HGB 8.5* 7.8*   HCT 28.2* 25.5*    147     Recent Labs     05/11/19  0511      K 3.5      CO2 25   BUN 11   CREATININE 0.5   CALCIUM 8.5     No results for input(s): AST, ALT, BILIDIR, BILITOT, ALKPHOS in the last 72 hours. No results for input(s): INR in the last 72 hours. No results for input(s): Eyvonne Ape in the last 72 hours. Urinalysis:      Lab Results   Component Value Date    NITRU NEGATIVE 05/09/2019    WBCUA > 200 05/09/2019    BACTERIA FEW 05/09/2019    RBCUA 5-10 05/09/2019    BLOODU MODERATE 05/09/2019    GLUCOSEU NEGATIVE 05/09/2019       Radiology:  XR CHEST PORTABLE   Final Result   1. Mild increased perihilar interstitial infiltrates bilaterally. This may be related to mild interstitial edema. 2. Small right-sided pleural effusion. This appears slightly improved from the prior examination. 3. Multifocal patchy mild airspace opacities at the medial right upper lung zone and lateral right lung base may represent mild atelectasis or pneumonia. This appears improved at the right lung base but slightly worsened at the medial right upper lung    zone. 4. Recommend short-term follow-up radiographic evaluation. If findings persist, further characterization could be obtained with chest CT. **This report has been created using voice recognition software. It may contain minor errors which are inherent in voice recognition technology. **      Final report electronically signed by Dr. Dimas Zabala on 5/9/2019 3:54 PM      XR COMPARISON OF OUTSIDE FILMS   Final Result      CT COMPARISON OF OUTSIDE FILMS   Final Result              Assessment/Plan: This is a 72 y.o. Female w/ h/o dementia, discitis, and right ureteral stone is admitted for hydronephrosis.       1. Right sided 8mm proximal ureteral stone with hydronephrosis hydronephrosis, s/p cystoscopy and right ureteroscopy and a renal scope E and basket retrieval of stones with removal of bladder stones and right ureteral stent was placed on 5/9/19    Followed by urology. Appreciate urology input. Urology signed off 5/10 and recommend to f/u w Dr. Ana Paula Zamora for ureteral stent removal in 1 week     2. Dysphagia    -Failed swallow study per speech.  -Strict NPO. -Probably will not tolerate peg / NGT given condition. Pt refused GI c/s, refused PEG/NGT placement. Per /POA Recardo Endo, pt is considering home w hospice, when asked about code status, pt states she does not want intubation and chest compression. palliative care and hospice consult already sent during the weekend       3. T 8-9 discitis    -ID on-board. Appreciate ID input    -cont ceftriaxone    4. Chronic back pain    -lidocaine patch    5. HTN, uncontrolled    -pt cant take po meds due to strict NPO  -cont hydralazine IV prn     6. DM type 2    -blood sugars acceptable  -on D5 LR right now since pt not eating   -SSI    7. Severe malnutrition    -dietician on-board     8. Hypokalemia, likely due to hypomagnesemia, resolved    9. Hypomagnesemia    -initially improved, now magnesium of 1.8 on 5/10/19  -magnesium replacement protocol    10.  Hx of stroke      Anticipated Discharge in : pending         Diet: Diet NPO Effective Now    DVT prophylaxis: [] Lovenox                                 [] SCDs                                 [] SQ Heparin                                 [] Encourage ambulation           [] Already on Anticoagulation     Disposition:    [] Home       [] TCU       [] Rehab       [] Psych       [] SNF       [] [facility]       [x] Other-awaiting palliative care and hospice consult     Code Status: Full Code    PT/OT Eval Status: on-board       Electronically signed by Venice Farias MD on 5/13/2019 at 8:05 AM

## 2019-05-13 NOTE — PROGRESS NOTES
Progress note: Infectious diseases    Patient - Manpreet Segura,  Age - 72 y.o.    - 1945      Room Number - 4A-08/008-A   MRN -  [id number]   Acct # - [de-identified]  Date of Admission -  2019  6:44 PM    SUBJECTIVE:   She has no new complaints. Her  is planning on taking her home on hospice  OBJECTIVE   VITALS    height is 5' 3\" (1.6 m) and weight is 180 lb 1.6 oz (81.7 kg). Her oral temperature is 99.2 °F (37.3 °C). Her blood pressure is 172/80 (abnormal) and her pulse is 73. Her respiration is 18 and oxygen saturation is 98%. Wt Readings from Last 3 Encounters:   19 180 lb 1.6 oz (81.7 kg)   19 213 lb 14.4 oz (97 kg)       I/O (24 Hours)    Intake/Output Summary (Last 24 hours) at 2019 0827  Last data filed at 2019 0408  Gross per 24 hour   Intake 3110 ml   Output --   Net 3110 ml       General Appearance  Awake, alert, oriented, chronically sick looking. HEENT - normocephalic, atraumatic, pink conjunctiva,  anicteric sclera  Neck - Supple, no mass  Lungs -  Bilateral good air entry, no rhonchi, no wheeze  Cardiovascular - Heart sounds are normal.     Abdomen - soft, not distended, nontender,   Neurologic - oriented.  Right side weakness  Skin - No bruising or bleeding  Extremities - No edema, no cyanosis, clubbing     MEDICATIONS:      hydrochlorothiazide  25 mg Oral Daily    scopolamine  1 patch Transdermal Q72H    cefTRIAXone (ROCEPHIN) IV  2 g Intravenous Q24H    docusate sodium  100 mg Oral Daily    senna  1 tablet Oral Nightly    magnesium oxide  400 mg Oral Daily    famotidine (PEPCID) injection  20 mg Intravenous BID    miconazole   Topical BID    magnesium replacement protocol   Other RX Placeholder    potassium (CARDIAC) replacement protocol   Other RX Placeholder    amLODIPine  5 mg Oral Daily    citalopram  10 mg Oral Daily    lisinopril  20 mg Oral BID    metoprolol tartrate  50 mg Oral BID    insulin glargine  20 Units Subcutaneous BID    sodium chloride flush  10 mL Intravenous 2 times per day    enoxaparin  40 mg Subcutaneous Daily    insulin lispro  0-6 Units Subcutaneous TID WC    insulin lispro  0-3 Units Subcutaneous Nightly      dextrose 5% in lactated ringers 100 mL/hr at 05/12/19 2335    dextrose       zinc oxide, hydrALAZINE, HYDROcodone 5 mg - acetaminophen **OR** HYDROcodone 5 mg - acetaminophen, promethazine, ondansetron, sodium chloride flush, magnesium hydroxide, metoclopramide, acetaminophen, glucose, dextrose, glucagon (rDNA), dextrose      LABS:     CBC:   Recent Labs     05/11/19  0511 05/13/19  0416   WBC 8.4 5.8   HGB 8.5* 7.8*    147     BMP:    Recent Labs     05/11/19  0511      K 3.5      CO2 25   BUN 11   CREATININE 0.5   GLUCOSE 75     Calcium:  Recent Labs     05/11/19  0511   CALCIUM 8.5    Glucose:  Recent Labs     05/12/19  1258 05/12/19  1746 05/12/19  1953   POCGLU 118* 92 105        Problem list of patient:     Patient Active Problem List   Diagnosis Code    Osteomyelitis ([facility] M86.9    Acute cystitis without hematuria N30.00    Type 2 diabetes mellitus with stage 3 chronic kidney disease, with long-term current use of insulin ([facility]) E11.22, N18.3, Z79.4    Essential hypertension I10    Severe protein-calorie malnutrition ([facility] E43    Examination for normal comparison for clinical research Z00.6    Discitis M46.40    Oliguria R34    Morbid obesity ([facility] E66.01    History of stroke with residual deficit I69.30    Hypoglycemia E16.2    Loose stools R19.5    Hyponatremia E87.1    Hypokalemia E87.6    Moderate malnutrition ([facility]) E44.0    Urinary retention R33.9    Recurrent UTI N39.0    Nausea and vomiting R11.2    Pleural effusion on right J90    Hypomagnesemia E83.42    Bilateral atelectasis J98.11    Normocytic anemia D64.9    Generalized weakness R53.1    Ureteral stone N20.1    Severe malnutrition ([address] E43    Hydronephrosis with ureteral calculus W63.8    Renal colic on right side X47    Bladder stones N21.0    Renal stones N20.0         ASSESSMENT/PLAN   Right ureteric stone: had stent  discites due to proteus: continue iv rocephin  Old stroke  Dysphagia: likely aspirating  Continue therapy   and patient are planning on comfort care measures and wants to take her home.   Discussion with hospice  Danny Navarro MD, [place] 5/13/2019 8:27 AM

## 2019-05-13 NOTE — PLAN OF CARE
Problem: Falls - Risk of:  Goal: Will remain free from falls  Description  Will remain free from falls  5/13/2019 1320 by Deni Pate RN  Outcome: Ongoing  Note:   Call light in reach, bed in lowest position, and bed alarm activated. Education given on use of call light before ambulation and when in need of assistance. Patient expressed understanding. Hourly visual checks performed and charted. Toileting offered to patient. No falls this shift, at any time. Arm band and falling star in place. Will continue to monitor. Problem: Falls - Risk of:  Goal: Absence of physical injury  Description  Absence of physical injury  Outcome: Ongoing  Note:   Patient remains free from physical injury this shift. Patient's call light remains within reach and hourly rounds performed. Problem: Risk for Impaired Skin Integrity  Goal: Tissue integrity - skin and mucous membranes  Description  Structural intactness and normal physiological function of skin and  mucous membranes. 5/13/2019 1320 by Deni Pate RN  Outcome: Ongoing  Note:   No signs of new skin breakdown. Skin warm, dry, and intact. Mucous membranes pink and moist.  Assistance with turns/ambulation provided PRN. Will continue to monitor. Problem: Nausea/Vomiting:  Goal: Absence of nausea/vomiting  Description  Absence of nausea/vomiting  5/13/2019 1320 by Deni Pate RN  Outcome: Ongoing  Note:   Patient showing signs of nausea this shift. Patient receiving multiple medications to help manage nausea. Problem: Nausea/Vomiting:  Goal: Able to drink  Description  Able to drink  Outcome: Ongoing  Note:   Patient remains NPO at this time. Problem: Nausea/Vomiting:  Goal: Able to eat  Description  Able to eat  Outcome: Ongoing  Note:   Patient remains NPO at this time.       Problem: Nausea/Vomiting:  Goal: Ability to achieve adequate nutritional intake will improve  Description  Ability to achieve adequate nutritional intake will improve  Outcome: Ongoing     Problem: Nutrition  Goal: Optimal nutrition therapy  Outcome: Ongoing  Note:   Patient remains NPO at this time. Patient refusing NG and PEG tubes. Patient wishing to be comfort care. Problem: DISCHARGE BARRIERS  Goal: Patient's continuum of care needs are met  5/13/2019 1320 by Cheryle Montero RN  Outcome: Ongoing  Note:   Patient wishing to return home with family support and comfort measures provided by hospice. Problem:   Goal: No urinary complication  Outcome: Ongoing  Note:   Lloyd catheter placed this shift due to skin breakdown to provide comfort measures for patient. Problem: Pain:  Goal: Pain level will decrease  Description  Pain level will decrease  5/13/2019 1320 by Cheryle Montero RN  Outcome: Ongoing  Note:   Patient has not reported any pain this shift. Patient has PRN tylenol ordered to help manage pain. Care plan reviewed with patient. Patient verbalizes understanding of the plan of care and contributed to goal setting.

## 2019-05-13 NOTE — PROGRESS NOTES
1300: met with pt and  and briefly discussed goals of care as Dr. Camilo Morejon had requested. Pt is resting in bed and does not appear to be in any acute distress. Pt is alert and able to answer yes and no questions.  in room and reviewed goals of care with pt and . Discussed options for care including code status options and benefits and risks of each level. Pt and  state they do not want any aggressive resuscitative measures. We review Limited code: NO SHOCK, NO CPR, NO INTUBATION, NO RESUSCITATIVE MEDS and pt and family confirm this is their wish. We also discussed comfort care, but  has questions about renal stent and IV antibiotics for current infection. Encourage  to further discuss with hospice. At this time pt and  do confirm for Limited code as above. Emotional support given. Dr. Camilo Morejon sent secure text on pt request for limited code status  Spiritual care consulted.

## 2019-05-13 NOTE — PLAN OF CARE
Problem: Falls - Risk of:  Goal: Will remain free from falls  Description  Will remain free from falls  Note:   Fall precautions maintained this shift. Nonskid socks on, call light within reach, 2/4 side rails raised, bed alarm on. No falls. Problem: Risk for Impaired Skin Integrity  Goal: Tissue integrity - skin and mucous membranes  Description  Structural intactness and normal physiological function of skin and  mucous membranes. Note:   Pt turned and repositioned every 2 hours with pillow support. Heels elevated off of bed. No new evidence of skin breakdown noted. Problem: Nausea/Vomiting:  Goal: Absence of nausea/vomiting  Description  Absence of nausea/vomiting  Note:   Pt c/o intermittent nausea this shift. Problem: DISCHARGE BARRIERS  Goal: Patient's continuum of care needs are met  Note:   Pt from home with home health. Discharge planning remains in place. Problem: Pain:  Goal: Pain level will decrease  Description  Pain level will decrease  Note:   No c/o pain this shift. Pain goal is 0/10. Care plan reviewed with patient and . Patient and  verbalize understanding of the plan of care and contribute to goal setting.

## 2019-05-13 NOTE — PROGRESS NOTES
81kgm)    Nutrition Diagnosis:   · Problem: Severe malnutrition, In context of acute illness or injury  · Etiology: related to Insufficient energy/nutrient consumption, Nausea, Vomiting     Signs and symptoms:  as evidenced by Diet history of poor intake, Severe loss of subcutaneous fat, Severe muscle loss    Objective Information:  · Nutrition-Focused Physical Findings: decline in swallow function on 5/11 and diet downgraded to NPO on 5/11 with speech therapy following, wishes for comfort care and does not desire PEG feeding tube, intermittent nausea/ vomiting, medication includes zofran, reglan, antibiotics (insulin held); today's glucose 127, 138  · Wound Type: (stage III thigh, stage I coccyx, multiple red/ rash areas- groin, farida-area, under breast, right arm)  · Current Nutrition Therapies:  · Oral Diet Orders: NPO   · Oral Nutrition Supplement (ONS) Orders: (plan ONS if diet advanced (glucerna once daily and magic cup BID))  · Anthropometric Measures:  · Ht: 5' 3\" (160 cm)   · Current Body Wt: 180 lb 1.6 oz (81.7 kg)(5/13; +2 pitting edema)  · Admission Body Wt: 182 lb 8 oz (82.8 kg)(5/8/19 +1 UE, +2 LE edema bedscale)  · Usual Body Wt: (~ 220# per . Per EMR: 4/4/19: 213#14.4oz (no edema))  · % Weight Change:  ,  17.1% in the 1 month  however question accuracy of bedscale weights (note  questioned bedscale weight 4/4/19, he thought weight was lower)  · Ideal Body Wt: 115 lb (52.2 kg),   · BMI Classification: BMI 30.0 - 34.9 Obese Class I    Nutrition Interventions:   (diet as per speech therapy and doctor)  Continued Inpatient Monitoring, Education Initiated, Coordination of Care(5/9: Discussed the need for good nutrition when diet initiated. 5/13: education not indicated at this time )    Nutrition Evaluation:   · Evaluation: No progress toward goals   · Goals: Pt will receive adequate nutrition in 1-4 days.     · Monitoring: Nutrition Progression, Wound Healing, Weight, Pertinent Labs, Chewing/Swallowing, Nausea or Vomiting, Monitor Bowel Function      Electronically signed by Jonathan Mckay RD, EVELINA on 5/13/19 at 3:21 PM    Contact Number: [phone]

## 2019-05-13 NOTE — PROGRESS NOTES
bed bound with use of hoyerlift for transfers PTA    General:       Vision: Within Functional Limits    Hearing: Within functional limits         Pain:  Denies. Pain Assessment  Pain Level: 0(at rest, c/o right hemibody chronic pain with rolling)       Social/Functional History:    Lives With: Spouse  Type of Home: House  Home Layout: One level  Home Access: Ramped entrance  Home Equipment: Hospital bed, Wheelchair-manual, Wheelchair-electric(criss lift)     Bathroom Shower/Tub: (sponge baths in supine)  Bathroom Toilet: (incontinent or uses bedpan)       ADL Assistance: Needs assistance     Homemaking Responsibilities: No  Ambulation Assistance: (more than 5 years since she ambulated)  Transfer Assistance: Needs assistance          Additional Comments: Sister in law reports that Pt has had a decline in status & has been bedbound for at least 2 months. Prior to that was dependent for t/fs with criss lift.  provides 24hr care, nurse for blood draws 1x/wk; sister in law has been helping . Pt able to feed self with fingers, dependent for ADLs in recent (was more indep 1 year ago). Objective:       RLE AROM: WFL  RLE General AROM: A/AAROM WFL with edema noted         LLE AROM : WFL  LLE General AROM: A/AAROM WFL with edema noted      Strength RLE: Exception  Comment: grossly 1/5, chronically RLE weaker than LLE since hx of CVA around 2.5 years ago per spouse    Strength LLE: Exception  Comment: grossly 2-/5      Sensation  Overall Sensation Status: WFL      Rolling to Left: Dependent/Total  Rolling to Right: Dependent/Total      Exercises:  Comments: sup BLE ankle pumps, heelslide, hip ABD/ADD, S.L.R. with max cues and maxA at BLE x8-10 reps, pt at times would initiate with max tactile and vcs and delayed response time       Activity Tolerance:  Activity Tolerance: Patient limited by fatigue;Patient limited by endurance    Treatment Initiated: see therex    Assessment:   Body structures, Functions, assess mobility  Long term goals  Time Frame for Long term goals : no LTGs set secondary to short ELOS    Evaluation Complexity: Based on the findings of patient history, examination, clinical presentation, and decision making during this evaluation, the evaluation of Rogelio White  is of medium complexity.

## 2019-05-13 NOTE — FLOWSHEET NOTE
05/09/19 1655   Encounter Summary   Services provided to: Patient and family together   Referral/Consult From: [address] [place] [address] Drive; Children;Family members   Place [facility] Completed   Continue Visiting Yes  (5/9/19 Pre-procedure support)   Complexity of Encounter Moderate   Length of Encounter 15 minutes   Spiritual Assessment Completed Yes   Routine   Type Initial   Assessment Approachable   Intervention Active listening;Nurtured hope;Discussed illness/injury and it's impact   Outcome Expressed gratitude;Engaged in conversation   Spiritual/Latter day   Type Spiritual support     Patient is a 72year old female. She is in her bed while her  is sitting in the chair next to the bed. Patient said, \"I was brought in here because I could not keep any food down. I was throwing up and was getting weaker and weaker. \" Patient's  shared with us that patient will be having a procedure done to remove kidney stone. Both patient and her  members of [address] [place] [address] in [facility]. Patient is engaged in conversation and appeared to be aware of the nature of her illness. Patient is receptive to 's visit/ Patient provided spiritual support and provided emotional support.  offered words of encouragement and nurtured hope as patient prepared for her surgery.  will follow up to provide support to patient as needed.
05/13/19 1025   Encounter Summary   Services provided to: Patient and family together   Referral/Consult From: Palliative Care   Support System Spouse; Children;Family members   Place of [facility] Completed   Continue Visiting Yes  (5/13/19 Palliative patient, fearful, )   Complexity of Encounter Moderate   Length of Encounter 15 minutes   Spiritual Assessment Completed Yes   Spiritual/Tenriism   Type Spiritual support   Assessment Anxious; Fearful;Coping   Intervention Active listening;Nurtured hope;Prayer;Discussed illness/injury and it's impact   Outcome Expressed gratitude;Engaged in conversation     Patient is a palliative Care who is now moving under hospice care. At the time of entry, patient is bed as her  is sitting int he chair next to her. Patient is barely awake but was able to respond to 's comment during the conversation. Patient's  said that the next plan for the family is for patient to be taken home for home hospice care. Patient responded and said she is fine with the decision. Hospice nurses will work along side with the family to coordinate and make plan for when patient has to be transported to her home for care.  offered encouragement and nurtured hope.  also offered emotional support including prayer. SC will follow up for continue emotional support as needed in t his case.
05/13/19 1736   Provider Notification   Reason for Communication Evaluate   Provider Name Dr. Dallas Damon   Provider Notification Physician   Method of Communication Secure Message     Physician contacted to inform of patient's complaint of pain. Physician ordered lidocaine patch q 24 hours. Patient did not get relief from patch. Physician updated. PRN morphine ordered. Patient updated.
[address]  OCCUPATIONAL THERAPY MISSED TREATMENT NOTE  [facility] 4A  4A-08/008-A      Date: 2019  Patient Name: Tomas Martinez        CSN: [id number]   : 1945  (72 y.o.)  Gender: female   Referring Practitioner: Dr. Trish Block  Diagnosis: ureteral stone         REASON FOR MISSED TREATMENT:  Missed Treat. Per RN, Pt/family still deciding course of treatment re: potential hospice. Spoke with SW who declined need for updated OT notes to start precert. Will continue to follow pending pt/family decision.
84

## 2019-05-13 NOTE — CARE COORDINATION
Pt failed bedside swallow eval per Speech Therapy. NPO status initiated. IV fluids continue. Palliative Care Eval ordered, Hospice to see pt. IV antibiotics continue. Will follow.   Electronically signed by Emmanuel Koehler RN on 5/13/2019 at 2:17 PM

## 2019-05-13 NOTE — PROGRESS NOTES
This RN hears pt coughing and entered the room to assess pt and obtain VS. The pt states that her gown may be wet because her  gave her a drink of water. Thorough education provided regarding strict NPO order per the physician. Pt voices understanding.

## 2019-05-13 NOTE — PLAN OF CARE
Nutrition Problem: Severe malnutrition, In context of acute illness or injury  Intervention: Food and/or Nutrient Delivery: (diet as per speech therapy and doctor)  Nutritional Goals: Pt will receive adequate nutrition in 1-4 days.    Problem: Nutrition  Goal: Optimal nutrition therapy  5/13/2019 1520 by Amira Gonzalez RD, LD  Outcome: Ongoing

## 2019-05-14 LAB
GLUCOSE BLD-MCNC: 105 MG/DL (ref 70–108)
GLUCOSE BLD-MCNC: 122 MG/DL (ref 70–108)
GLUCOSE BLD-MCNC: 129 MG/DL (ref 70–108)
GLUCOSE BLD-MCNC: 131 MG/DL (ref 70–108)
GLUCOSE BLD-MCNC: 131 MG/DL (ref 70–108)
ORGANISM: ABNORMAL
STONE ANALYSIS: NORMAL
URINE CULTURE, ROUTINE: ABNORMAL
URINE CULTURE, ROUTINE: ABNORMAL

## 2019-05-14 PROCEDURE — 6370000000 HC RX 637 (ALT 250 FOR IP): Performed by: HOSPITALIST

## 2019-05-14 PROCEDURE — 2709999900 HC NON-CHARGEABLE SUPPLY

## 2019-05-14 PROCEDURE — 2500000003 HC RX 250 WO HCPCS: Performed by: NURSE PRACTITIONER

## 2019-05-14 PROCEDURE — 6370000000 HC RX 637 (ALT 250 FOR IP): Performed by: FAMILY MEDICINE

## 2019-05-14 PROCEDURE — 1200000000 HC SEMI PRIVATE

## 2019-05-14 PROCEDURE — 6360000002 HC RX W HCPCS: Performed by: NURSE PRACTITIONER

## 2019-05-14 PROCEDURE — 1200000003 HC TELEMETRY R&B

## 2019-05-14 PROCEDURE — 2580000003 HC RX 258: Performed by: FAMILY MEDICINE

## 2019-05-14 PROCEDURE — 99232 SBSQ HOSP IP/OBS MODERATE 35: CPT | Performed by: FAMILY MEDICINE

## 2019-05-14 PROCEDURE — 6360000002 HC RX W HCPCS: Performed by: FAMILY MEDICINE

## 2019-05-14 PROCEDURE — 2580000003 HC RX 258: Performed by: NURSE PRACTITIONER

## 2019-05-14 PROCEDURE — 6370000000 HC RX 637 (ALT 250 FOR IP): Performed by: NURSE PRACTITIONER

## 2019-05-14 PROCEDURE — 82948 REAGENT STRIP/BLOOD GLUCOSE: CPT

## 2019-05-14 RX ORDER — DEXTROSE, SODIUM CHLORIDE, SODIUM LACTATE, POTASSIUM CHLORIDE, AND CALCIUM CHLORIDE 5; .6; .31; .03; .02 G/100ML; G/100ML; G/100ML; G/100ML; G/100ML
INJECTION, SOLUTION INTRAVENOUS CONTINUOUS
Status: DISCONTINUED | OUTPATIENT
Start: 2019-05-14 | End: 2019-05-17 | Stop reason: HOSPADM

## 2019-05-14 RX ORDER — FLUCONAZOLE 2 MG/ML
200 INJECTION, SOLUTION INTRAVENOUS EVERY 24 HOURS
Status: DISCONTINUED | OUTPATIENT
Start: 2019-05-14 | End: 2019-05-17 | Stop reason: HOSPADM

## 2019-05-14 RX ORDER — CLONIDINE 0.1 MG/24H
1 PATCH, EXTENDED RELEASE TRANSDERMAL WEEKLY
Status: DISCONTINUED | OUTPATIENT
Start: 2019-05-14 | End: 2019-05-14 | Stop reason: RX

## 2019-05-14 RX ADMIN — FLUCONAZOLE 200 MG: 200 INJECTION, SOLUTION INTRAVENOUS at 11:17

## 2019-05-14 RX ADMIN — FAMOTIDINE 20 MG: 10 INJECTION, SOLUTION INTRAVENOUS at 10:06

## 2019-05-14 RX ADMIN — ONDANSETRON 4 MG: 2 INJECTION INTRAMUSCULAR; INTRAVENOUS at 23:17

## 2019-05-14 RX ADMIN — ENOXAPARIN SODIUM 40 MG: 40 INJECTION SUBCUTANEOUS at 10:06

## 2019-05-14 RX ADMIN — FAMOTIDINE 20 MG: 10 INJECTION, SOLUTION INTRAVENOUS at 21:44

## 2019-05-14 RX ADMIN — METOCLOPRAMIDE 10 MG: 5 INJECTION, SOLUTION INTRAMUSCULAR; INTRAVENOUS at 00:04

## 2019-05-14 RX ADMIN — INSULIN GLARGINE 20 UNITS: 100 INJECTION, SOLUTION SUBCUTANEOUS at 21:44

## 2019-05-14 RX ADMIN — HYDRALAZINE HYDROCHLORIDE 10 MG: 20 INJECTION INTRAMUSCULAR; INTRAVENOUS at 10:08

## 2019-05-14 RX ADMIN — MICONAZOLE NITRATE: 20 POWDER TOPICAL at 11:04

## 2019-05-14 RX ADMIN — Medication 10 ML: at 11:02

## 2019-05-14 RX ADMIN — SODIUM CHLORIDE, SODIUM LACTATE, POTASSIUM CHLORIDE, CALCIUM CHLORIDE AND DEXTROSE MONOHYDRATE: 5; 600; 310; 30; 20 INJECTION, SOLUTION INTRAVENOUS at 12:30

## 2019-05-14 RX ADMIN — MICONAZOLE NITRATE: 20 POWDER TOPICAL at 21:41

## 2019-05-14 RX ADMIN — HYDRALAZINE HYDROCHLORIDE 10 MG: 20 INJECTION INTRAMUSCULAR; INTRAVENOUS at 23:19

## 2019-05-14 RX ADMIN — Medication 10 ML: at 21:42

## 2019-05-14 ASSESSMENT — PAIN SCALES - GENERAL
PAINLEVEL_OUTOF10: 0
PAINLEVEL_OUTOF10: 0

## 2019-05-14 NOTE — PLAN OF CARE
Problem: Falls - Risk of:  Goal: Will remain free from falls  Description  Will remain free from falls  5/13/2019 2322 by Radha Dowd RN  Note:   Fall precautions maintained this shift. Nonskid socks on, call light within reach, 2/4 side rails raised, bed alarm on. No falls this shift. Problem: Risk for Impaired Skin Integrity  Goal: Tissue integrity - skin and mucous membranes  Description  Structural intactness and normal physiological function of skin and  mucous membranes. 5/13/2019 2322 by Radha Dowd RN  Note:   Pt turned and repositioned every 2 hours and as needed. Heels elevated off of bed. No new evidence of skin breakdown noted. Problem: Nausea/Vomiting:  Goal: Absence of nausea/vomiting  Description  Absence of nausea/vomiting  5/13/2019 2322 by Radha Dowd RN  Note:   Pt c/o intermittent nausea this shift. PRN zofran used for nausea. Problem: Nutrition  Goal: Optimal nutrition therapy  5/13/2019 2322 by Radha Dowd RN  Note:   Pt NPO at this time d/t failing swallow assessment. Pt and family refusing artificial nutrition options at this time. Problem: DISCHARGE BARRIERS  Goal: Patient's continuum of care needs are met  5/13/2019 2322 by Radha Dowd RN  Note:   Pt to be discharged back to home with hospice. Problem: Pain:  Goal: Control of acute pain  Description  Control of acute pain  5/13/2019 2322 by Radha Dowd RN  Note:   Pt doesn't c/o pain this shift. PRN medications available if needed. Care plan reviewed with patient and . Patient and  verbalize understanding of the plan of care and contribute to goal setting.

## 2019-05-14 NOTE — PROGRESS NOTES
Hospitalist Progress Note    Patient:  Nanette Quesada      Unit/Bed:4A-08/008-A    YOB: 1945    MRN: [id number]       Acct: [de-identified]     PCP: Angela Bowen MD    Date of Admission: 5/8/2019    Chief Complaint: transferred from [facility] for 8mm ureteral stone with hydronephrosis      Hospital Course:     Please see H/P for details. In summary, this is a 72 y.o. Female, w PMH of  DM 2, discitis, CKD, HTN, hx of stroke w right sided weakness, wheelchair-bound,  was transferred from [facility] for 8mm ureteral stone with hydronephrosis. Patient was admitted under [facility] [place] [address] service. Urology was consulted. She had cystoscopy with removal of bladder stones, right ureteroscopy, basket retrieval of right ureteral stones, right ureterorenoscopy, basket retrieval of right renal stones and placement of right ureteral stent on 5/9/19. ID also following for the discitis, currently on rocephin. Palliative care and hospice team consulted on 5/12/19, /POGABY Christian expressed home w hospice. Also pt failed swallow eval, now NPO. On 5/13/19, patient's code status was changed to limited x4 and patient wants home w hospice. Subjective:     Patient seen and examined. Pt states nausea and mid back pain better today. denies abd pain. Denies new leg weakness or numbness. She has chronic right leg and right arm weakness due to old stroke. Denies fever, chills, sob, cough. On ayers cath.        Medications:  Reviewed    Infusion Medications    dextrose       Scheduled Medications    cloNIDine  1 patch Transdermal Weekly    fluconazole  200 mg Intravenous Q24H    hydrochlorothiazide  25 mg Oral Daily    lidocaine  1 patch Transdermal Daily    scopolamine  1 patch Transdermal Q72H    cefTRIAXone (ROCEPHIN) IV  2 g Intravenous Q24H    docusate sodium  100 mg Oral Daily    senna  1 tablet Oral Nightly    magnesium oxide  400 mg Oral Daily    famotidine (PEPCID) injection  20 mg Intravenous BID    miconazole   Topical BID    magnesium replacement protocol   Other RX Placeholder    potassium (CARDIAC) replacement protocol   Other RX Placeholder    amLODIPine  5 mg Oral Daily    citalopram  10 mg Oral Daily    lisinopril  20 mg Oral BID    metoprolol tartrate  50 mg Oral BID    insulin glargine  20 Units Subcutaneous BID    sodium chloride flush  10 mL Intravenous 2 times per day    enoxaparin  40 mg Subcutaneous Daily    insulin lispro  0-6 Units Subcutaneous TID WC    insulin lispro  0-3 Units Subcutaneous Nightly     PRN Meds: hydrALAZINE, morphine, zinc oxide, HYDROcodone 5 mg - acetaminophen **OR** HYDROcodone 5 mg - acetaminophen, promethazine, ondansetron, sodium chloride flush, magnesium hydroxide, metoclopramide, acetaminophen, glucose, dextrose, glucagon (rDNA), dextrose      Intake/Output Summary (Last 24 hours) at 5/14/2019 0900  Last data filed at 5/14/2019 0435  Gross per 24 hour   Intake 2057.8 ml   Output 1225 ml   Net 832.8 ml       Diet:  Diet NPO Effective Now    Exam:  BP (!) 162/76   Pulse 77   Temp 97.6 °F (36.4 °C) (Oral)   Resp 18   Ht 5' 3\" (1.6 m)   Wt 182 lb 11.2 oz (82.9 kg)   LMP  (LMP Unknown) Comment: post menopase  SpO2 94%   BMI 32.36 kg/m²     General appearance: alert, not in acute distress. HEENT: Pupils equal, round, and reactive to light. Conjunctivae clear. Clear oral mucosa. Neck: Supple, with full range of motion. Respiratory: On 2L O2 via NC. Clear to auscultation, bilaterally without Rales/Wheezes/Rhonchi. Cardiovascular: normal rate, regular rhythm with normal S1/S2 without murmurs, rubs or gallops. Abdomen: Soft, non-tender, non-distended with normal bowel sounds. Musculoskeletal: passive and active ROM x 4 extremities.  No back tenderness   Neurological exam reveals alert, oriented x3, normal speech, RUE: 3/5 ( chronic per  due to stroke 2.5 years ago), LUE: 5/5, RLE: 3/5 ( chronic per  due to stroke 2.5 years ago), LLE: 5/5. Intact sensation. Exam of extremities: B/L pitting pedal edema grad1 +        Labs:   Recent Labs     05/13/19  0416 05/13/19  2203   WBC 5.8  --    HGB 7.8* 8.0*   HCT 25.5* 25.8*     --      No results for input(s): NA, K, CL, CO2, BUN, CREATININE, CALCIUM, PHOS in the last 72 hours. Invalid input(s): MAGNES  No results for input(s): AST, ALT, BILIDIR, BILITOT, ALKPHOS in the last 72 hours. No results for input(s): INR in the last 72 hours. No results for input(s): Blaze Gal in the last 72 hours. Urinalysis:      Lab Results   Component Value Date    NITRU NEGATIVE 05/13/2019    WBCUA > 200 05/13/2019    BACTERIA OBSCURED 05/13/2019    RBCUA OBSCURED 05/13/2019    BLOODU LARGE 05/13/2019    SPECGRAV 1.020 05/13/2019    GLUCOSEU NEGATIVE 05/09/2019       Radiology:  XR CHEST PORTABLE   Final Result   1. Mild increased perihilar interstitial infiltrates bilaterally. This may be related to mild interstitial edema. 2. Small right-sided pleural effusion. This appears slightly improved from the prior examination. 3. Multifocal patchy mild airspace opacities at the medial right upper lung zone and lateral right lung base may represent mild atelectasis or pneumonia. This appears improved at the right lung base but slightly worsened at the medial right upper lung    zone. 4. Recommend short-term follow-up radiographic evaluation. If findings persist, further characterization could be obtained with chest CT. **This report has been created using voice recognition software. It may contain minor errors which are inherent in voice recognition technology. **      Final report electronically signed by Dr. Neeru Judd on 5/9/2019 3:54 PM      XR COMPARISON OF OUTSIDE FILMS   Final Result      CT COMPARISON OF OUTSIDE FILMS   Final Result            Assessment/Plan: This is a 72 y.o.  Female w/ h/o dementia, discitis, and right ureteral stone is admitted Psych       [] SNF       [] [facility]       [x] Other-transfer to 5K    Code Status: Limited          Electronically signed by Jose Manuel Guerra MD on 5/14/2019 at 9:00 AM

## 2019-05-14 NOTE — PROGRESS NOTES
Visit made to see plan of care and see if family with questions in regards to hospice care.  Annette Hale at bedside with Emma Franco. Emma Franco reports pain better controlled through night and denies today. Updated by primary nurse Sandra that plan of removal of stent on Thursday with discharge home with hospice. Annette Hale told nurse has all equipment at home needed for patient care. Both in agreement for [facility] hospice when pt returning home. Discussed attending physician for care once home and will want Dr. Danuta Odom director. Will continue to follow with patient while in hospital and assist coordinating admission to service at home.

## 2019-05-14 NOTE — PROGRESS NOTES
Placeholder    amLODIPine  5 mg Oral Daily    citalopram  10 mg Oral Daily    lisinopril  20 mg Oral BID    metoprolol tartrate  50 mg Oral BID    insulin glargine  20 Units Subcutaneous BID    sodium chloride flush  10 mL Intravenous 2 times per day    enoxaparin  40 mg Subcutaneous Daily    insulin lispro  0-6 Units Subcutaneous TID WC    insulin lispro  0-3 Units Subcutaneous Nightly      dextrose       hydrALAZINE, morphine, zinc oxide, HYDROcodone 5 mg - acetaminophen **OR** HYDROcodone 5 mg - acetaminophen, promethazine, ondansetron, sodium chloride flush, magnesium hydroxide, metoclopramide, acetaminophen, glucose, dextrose, glucagon (rDNA), dextrose      LABS:     CBC:   Recent Labs     05/13/19  0416 05/13/19  2203   WBC 5.8  --    HGB 7.8* 8.0*     --      BMP:    No results for input(s): NA, K, CL, CO2, BUN, CREATININE, GLUCOSE in the last 72 hours. Calcium:  No results for input(s): CALCIUM in the last 72 hours.  Glucose:  Recent Labs     05/13/19  1741 05/13/19  1957 05/14/19  0827   POCGLU 131* 145* 131*        Problem list of patient:     Patient Active Problem List   Diagnosis Code    [facility] M86.9    Acute cystitis without hematuria N30.00    Type 2 diabetes mellitus with stage 3 chronic kidney disease, with long-term current use of insulin ([facility] E11.22, N18.3, Z79.4    Essential hypertension I10    Severe protein-calorie malnutrition ([facility] E43    Examination for normal comparison for clinical research Z00.6    Discitis M46.40    Oliguria R34    Morbid obesity ([facility] E66.01    History of stroke with residual deficit I69.30    Hypoglycemia E16.2    Loose stools R19.5    Hyponatremia E87.1    Hypokalemia E87.6    Moderate malnutrition (HCC) E44.0    Urinary retention R33.9    Recurrent UTI N39.0    Nausea and vomiting R11.2    Pleural effusion on right J90    Hypomagnesemia E83.42    Bilateral atelectasis J98.11    Normocytic anemia D64.9    Generalized weakness R53.1    Ureteral stone N20.1    Severe malnutrition ([address] E43    Hydronephrosis with ureteral calculus C33.0    Renal colic on right side U98    Bladder stones N21.0    Renal stones N20.0         ASSESSMENT/PLAN   Right ureteric stone: had stent. Will be removed prior to discharge  discites due to proteus: on iv rocephin.  Will be stopped on dishcarge  Old stroke  Dysphagia: likely aspirating  Continue therapy  Will be transitioned to hospice on discharge   ID will sign off  Henry Phipps MD, FACP 5/14/2019 11:09 AM

## 2019-05-14 NOTE — PROGRESS NOTES
[address]  PHYSICAL THERAPY MISSED TREATMENT NOTE  ACUTE CARE    Date: 2019  Patient Name: Connor Mckinnon        MRN: [id number]   : 1945  (72 y.o.)  Gender: female   Referring Practitioner: Dr. Gilford Antonio  Referring Practitioner: Dr. Yimi Dow  Diagnosis: ureteral stone  Diagnosis: ureteral stone         REASON FOR MISSED TREATMENT:  Will discharge from inpatient therapy at this time, as pt is transitioning to hospice .

## 2019-05-14 NOTE — CARE COORDINATION
5/14/19, 1:28 PM    DISCHARGE BARRIERS        Patient transferred to [facility]. Report given to unit Lianne Terrell, regarding discharge plan for this patient.

## 2019-05-14 NOTE — CARE COORDINATION
5/14/19, 11:31 AM    DISCHARGE BARRIERS  Mele Whitehead will be going home with her spouse and [address] [place] [address]. Spoke with spouse Sylvain Turner and he no longer wants [facility] ECF. OLGA called and spoke with Katina at [facility] to make her aware Mele Whitehead no longer needs a bed in their facility. She voiced understanding. OLGA also called and left a message for Rahel at [facility]. OLGA needs to make her aware patient is going home with hospice and no longer needs their services. Update 12:01pm: Spoke with Rahel at [facility]. Made her aware patient will be going home with [facility]. They will discharge Mele Whitehead from their services.

## 2019-05-14 NOTE — CARE COORDINATION
Plan transfer to [address], [facility].   Electronically signed by Elsie Sharif RN on 5/14/2019 at 11:23 AM

## 2019-05-15 LAB
FERRITIN: 546 NG/ML (ref 10–291)
FOLATE: < 2 NG/ML (ref 4.8–24.2)
GLUCOSE BLD-MCNC: 101 MG/DL (ref 70–108)
GLUCOSE BLD-MCNC: 108 MG/DL (ref 70–108)
GLUCOSE BLD-MCNC: 89 MG/DL (ref 70–108)
GLUCOSE BLD-MCNC: 99 MG/DL (ref 70–108)
HCT VFR BLD CALC: 27.1 % (ref 37–47)
HEMOGLOBIN: 8.4 GM/DL (ref 12–16)
IRON: 74 UG/DL (ref 50–170)
TOTAL IRON BINDING CAPACITY: 119 UG/DL (ref 171–450)
VITAMIN B-12: 769 PG/ML (ref 211–911)

## 2019-05-15 PROCEDURE — 2500000003 HC RX 250 WO HCPCS: Performed by: NURSE PRACTITIONER

## 2019-05-15 PROCEDURE — 85014 HEMATOCRIT: CPT

## 2019-05-15 PROCEDURE — 82948 REAGENT STRIP/BLOOD GLUCOSE: CPT

## 2019-05-15 PROCEDURE — 1200000000 HC SEMI PRIVATE

## 2019-05-15 PROCEDURE — 6360000002 HC RX W HCPCS: Performed by: NURSE PRACTITIONER

## 2019-05-15 PROCEDURE — 2709999900 HC NON-CHARGEABLE SUPPLY

## 2019-05-15 PROCEDURE — 36591 DRAW BLOOD OFF VENOUS DEVICE: CPT

## 2019-05-15 PROCEDURE — 2580000003 HC RX 258: Performed by: FAMILY MEDICINE

## 2019-05-15 PROCEDURE — 6360000002 HC RX W HCPCS: Performed by: INTERNAL MEDICINE

## 2019-05-15 PROCEDURE — 82728 ASSAY OF FERRITIN: CPT

## 2019-05-15 PROCEDURE — 85018 HEMOGLOBIN: CPT

## 2019-05-15 PROCEDURE — 99232 SBSQ HOSP IP/OBS MODERATE 35: CPT | Performed by: FAMILY MEDICINE

## 2019-05-15 PROCEDURE — 82746 ASSAY OF FOLIC ACID SERUM: CPT

## 2019-05-15 PROCEDURE — 82607 VITAMIN B-12: CPT

## 2019-05-15 PROCEDURE — 2580000003 HC RX 258: Performed by: NURSE PRACTITIONER

## 2019-05-15 PROCEDURE — 6370000000 HC RX 637 (ALT 250 FOR IP): Performed by: FAMILY MEDICINE

## 2019-05-15 PROCEDURE — 83550 IRON BINDING TEST: CPT

## 2019-05-15 PROCEDURE — 6360000002 HC RX W HCPCS: Performed by: FAMILY MEDICINE

## 2019-05-15 PROCEDURE — 2580000003 HC RX 258: Performed by: INTERNAL MEDICINE

## 2019-05-15 PROCEDURE — 83540 ASSAY OF IRON: CPT

## 2019-05-15 RX ADMIN — Medication 10 ML: at 08:40

## 2019-05-15 RX ADMIN — HYDRALAZINE HYDROCHLORIDE 10 MG: 20 INJECTION INTRAMUSCULAR; INTRAVENOUS at 08:39

## 2019-05-15 RX ADMIN — HYDRALAZINE HYDROCHLORIDE 10 MG: 20 INJECTION INTRAMUSCULAR; INTRAVENOUS at 20:04

## 2019-05-15 RX ADMIN — CEFTRIAXONE SODIUM 2 G: 2 INJECTION, POWDER, FOR SOLUTION INTRAMUSCULAR; INTRAVENOUS at 11:25

## 2019-05-15 RX ADMIN — FAMOTIDINE 20 MG: 10 INJECTION, SOLUTION INTRAVENOUS at 10:21

## 2019-05-15 RX ADMIN — MORPHINE SULFATE 1 MG: 2 INJECTION, SOLUTION INTRAMUSCULAR; INTRAVENOUS at 13:45

## 2019-05-15 RX ADMIN — MICONAZOLE NITRATE: 20 POWDER TOPICAL at 10:23

## 2019-05-15 RX ADMIN — SODIUM CHLORIDE, SODIUM LACTATE, POTASSIUM CHLORIDE, CALCIUM CHLORIDE AND DEXTROSE MONOHYDRATE: 5; 600; 310; 30; 20 INJECTION, SOLUTION INTRAVENOUS at 09:18

## 2019-05-15 RX ADMIN — FLUCONAZOLE 200 MG: 200 INJECTION, SOLUTION INTRAVENOUS at 10:24

## 2019-05-15 RX ADMIN — MICONAZOLE NITRATE: 20 POWDER TOPICAL at 22:10

## 2019-05-15 RX ADMIN — ENOXAPARIN SODIUM 40 MG: 40 INJECTION SUBCUTANEOUS at 10:21

## 2019-05-15 RX ADMIN — ONDANSETRON 4 MG: 2 INJECTION INTRAMUSCULAR; INTRAVENOUS at 18:01

## 2019-05-15 RX ADMIN — FAMOTIDINE 20 MG: 10 INJECTION, SOLUTION INTRAVENOUS at 20:04

## 2019-05-15 ASSESSMENT — PAIN SCALES - GENERAL
PAINLEVEL_OUTOF10: 0
PAINLEVEL_OUTOF10: 7
PAINLEVEL_OUTOF10: 4
PAINLEVEL_OUTOF10: 0
PAINLEVEL_OUTOF10: 0

## 2019-05-15 ASSESSMENT — PAIN - FUNCTIONAL ASSESSMENT: PAIN_FUNCTIONAL_ASSESSMENT: PREVENTS OR INTERFERES SOME ACTIVE ACTIVITIES AND ADLS

## 2019-05-15 ASSESSMENT — PAIN DESCRIPTION - DESCRIPTORS: DESCRIPTORS: DISCOMFORT

## 2019-05-15 ASSESSMENT — PAIN DESCRIPTION - FREQUENCY: FREQUENCY: INTERMITTENT

## 2019-05-15 ASSESSMENT — PAIN DESCRIPTION - PAIN TYPE: TYPE: ACUTE PAIN

## 2019-05-15 ASSESSMENT — PAIN DESCRIPTION - PROGRESSION: CLINICAL_PROGRESSION: NOT CHANGED

## 2019-05-15 ASSESSMENT — PAIN DESCRIPTION - LOCATION: LOCATION: GENERALIZED

## 2019-05-15 NOTE — PROGRESS NOTES
Hospitalist Progress Note    Patient:  Connor Mckinnon      Unit/Bed:5K-26/026-A    YOB: 1945    MRN: [id number]       Acct: [de-identified]     PCP: Jerald Chao MD    Date of Admission: 5/8/2019    Chief Complaint: transferred from [facility] for 8mm ureteral stone with hydronephrosis      Hospital Course:     Please see H/P for details. In summary, this is a 72 y.o. Female, w PMH of  DM 2, discitis, CKD, HTN, hx of stroke w right sided weakness, wheelchair-bound,  was transferred from [facility] for 8mm ureteral stone with hydronephrosis. Patient was admitted under [facility] [place] [address] service. Urology was consulted. She had cystoscopy with removal of bladder stones, right ureteroscopy, basket retrieval of right ureteral stones, right ureterorenoscopy, basket retrieval of right renal stones and placement of right ureteral stent on 5/9/19. ID also following for the discitis, currently on rocephin. Palliative care and hospice team consulted on 5/12/19, /POGABY Christian expressed home w hospice. Also pt failed swallow eval, now NPO. Subjective:     Patient seen and examined. Pt states she's a little nauseous and had vomiting few minutes ago. She denies abd pain. Mid back pain is now intermittent. Has rt lower back pain, 2-3/10. Denies fever and chills.        Medications:  Reviewed    Infusion Medications    dextrose 5% in lactated ringers 75 mL/hr at 05/15/19 0918    dextrose       Scheduled Medications    fluconazole  200 mg Intravenous Q24H    hydrochlorothiazide  25 mg Oral Daily    lidocaine  1 patch Transdermal Daily    scopolamine  1 patch Transdermal Q72H    cefTRIAXone (ROCEPHIN) IV  2 g Intravenous Q24H    docusate sodium  100 mg Oral Daily    senna  1 tablet Oral Nightly    magnesium oxide  400 mg Oral Daily    famotidine (PEPCID) injection  20 mg Intravenous BID    miconazole   Topical BID    magnesium replacement protocol   Other RX Placeholder    potassium (CARDIAC) replacement protocol   Other RX Placeholder    amLODIPine  5 mg Oral Daily    citalopram  10 mg Oral Daily    lisinopril  20 mg Oral BID    metoprolol tartrate  50 mg Oral BID    insulin glargine  20 Units Subcutaneous BID    sodium chloride flush  10 mL Intravenous 2 times per day    enoxaparin  40 mg Subcutaneous Daily    insulin lispro  0-6 Units Subcutaneous TID WC    insulin lispro  0-3 Units Subcutaneous Nightly     PRN Meds: hydrALAZINE, morphine, zinc oxide, HYDROcodone 5 mg - acetaminophen **OR** HYDROcodone 5 mg - acetaminophen, promethazine, ondansetron, sodium chloride flush, magnesium hydroxide, metoclopramide, acetaminophen, glucose, dextrose, glucagon (rDNA), dextrose      Intake/Output Summary (Last 24 hours) at 5/15/2019 1542  Last data filed at 5/15/2019 0609  Gross per 24 hour   Intake 568 ml   Output 125 ml   Net 443 ml       Diet:  Diet NPO Effective Now    Exam:  BP (!) 184/88 Comment: PRN hydralazine given  Pulse 69   Temp 98.4 °F (36.9 °C) (Oral)   Resp 16   Ht 5' 3\" (1.6 m)   Wt 182 lb 11.2 oz (82.9 kg)   LMP  (LMP Unknown) Comment: post menopase  SpO2 94%   BMI 32.36 kg/m²     General appearance: alert, not in acute distress. HEENT: Pupils equal, round, and reactive to light. Conjunctivae clear. Clear oral mucosa. Neck: Supple, with full range of motion. Respiratory:  Normal respiratory effort. Clear to auscultation, bilaterally without Rales/Wheezes/Rhonchi. Cardiovascular: normal rate, regular rhythm with normal S1/S2 without murmurs, rubs or gallops. Abdomen: Soft, non-tender, non-distended with normal bowel sounds. Musculoskeletal: passive and active ROM x 4 extremities. No back tenderness   Neurological exam reveals alert, oriented x3, normal speech, RUE: 3/5 ( chronic per  due to stroke 2.5 years ago), LUE: 5/5, RLE: 3/5 ( chronic per  due to stroke 2.5 years ago), LLE: 5/5. Intact sensation.    Exam of extremities: B/L pitting pedal edema grade 2 +        Labs:   Recent Labs     05/13/19  0416 05/13/19  2203 05/15/19  0535   WBC 5.8  --   --    HGB 7.8* 8.0* 8.4*   HCT 25.5* 25.8* 27.1*     --   --      No results for input(s): NA, K, CL, CO2, BUN, CREATININE, CALCIUM, PHOS in the last 72 hours. Invalid input(s): MAGNES  No results for input(s): AST, ALT, BILIDIR, BILITOT, ALKPHOS in the last 72 hours. No results for input(s): INR in the last 72 hours. No results for input(s): Jamey Milch in the last 72 hours. Urinalysis:      Lab Results   Component Value Date    NITRU NEGATIVE 05/13/2019    WBCUA > 200 05/13/2019    BACTERIA OBSCURED 05/13/2019    RBCUA OBSCURED 05/13/2019    BLOODU LARGE 05/13/2019    SPECGRAV 1.020 05/13/2019    GLUCOSEU NEGATIVE 05/09/2019       Radiology:  XR CHEST PORTABLE   Final Result   1. Mild increased perihilar interstitial infiltrates bilaterally. This may be related to mild interstitial edema. 2. Small right-sided pleural effusion. This appears slightly improved from the prior examination. 3. Multifocal patchy mild airspace opacities at the medial right upper lung zone and lateral right lung base may represent mild atelectasis or pneumonia. This appears improved at the right lung base but slightly worsened at the medial right upper lung    zone. 4. Recommend short-term follow-up radiographic evaluation. If findings persist, further characterization could be obtained with chest CT. **This report has been created using voice recognition software. It may contain minor errors which are inherent in voice recognition technology. **      Final report electronically signed by Dr. Erich Mack on 5/9/2019 3:54 PM      XR COMPARISON OF OUTSIDE FILMS   Final Result      CT COMPARISON OF OUTSIDE FILMS   Final Result              Assessment/Plan: This is [phone] y.o. Female w/ h/o dementia, discitis, and right ureteral stone is admitted for hydronephrosis.       1. Right sided 8mm proximal ureteral stone with hydronephrosis hydronephrosis, s/p cystoscopy and right ureteroscopy and a renal scope E and basket retrieval of stones with removal of bladder stones and right ureteral stent was placed on 5/9/19     Followed by urology. Appreciate urology input. Urology signed off 5/10 and recommend to f/u w Dr. Nola Vora ureteral stent removal in 1 week. Per discussion during collaborative report, plan is to remove stent while inpatient and DC home w hospice  -hospice on-board. Appreciate hospice input     2. Dysphagia     -Failed swallow study per speech.  -Strict NPO. -Probably will not tolerate peg / NGT given condition. Pt refused GI c/s, refused PEG/NGT placement. Per /POA [facility], pt is considering home w hospice, when asked about code status, pt states she does not want intubation and chest compression. palliative care and hospice evaluated the pt on 5/13/19      3. 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 Appreciate ID input. ID signed off 5/14/19  -per Dr. Merle Crowley, cont ceftriaxone while inpatient and stop on DC.      4. Candida albicans UTI     -cont diflucan IV while IP    5. N/V    -zofran      6. Chronic back pain     -lidocaine patch  -cont morphine IV prn      7. HTN, uncontrolled     -pt cant take po meds due to strict NPO  -cont hydralazine IV prn   -ordered clonidine patch but not available in formulary      8. DM type 2     -blood sugars acceptable  -on D5 LR right now since pt not eating   -SSI     9. Severe malnutrition     -dietician on-board      10. Hypokalemia, likely due to hypomagnesemia, resolved     11. Hypomagnesemia     -initially improved, now magnesium of 1.8 on 5/10/19  -magnesium replacement protocol  Pt and  states they does not want blood draw anymore.        12. Hx of stroke    13. Normocytic anemia    -Hgb ranges 8-10 since 4/2019. Hgb stable at 8.4. Pt and  states they does not want blood draw anymore.

## 2019-05-15 NOTE — CARE COORDINATION
5/15/19  2:00 PM    Transitioning to Hospice. PT/OT discontinued. ID signing off; currently on IV Rocephin but to be discontinued at discharge. Pt failed swallow eval previously so is NPO, GI consulted for possible need for PEG placement. However, this apparently is not desired per pt/family. Lloyd remains. Ureteral stent to be removed tomorrow prior to discharge. Pt very edematous. Pt remains alert and oriented. Hospice now managing discharge.

## 2019-05-15 NOTE — PLAN OF CARE
Problem: Falls - Risk of:  Goal: Will remain free from falls  Description  Will remain free from falls  5/15/2019 0358 by Brayden Cisneros RN  Outcome: Ongoing  Note:   All fall precautions in place. No falls this shift. Personal belongings at bedside. Bed alarm on. Problem: Falls - Risk of:  Goal: Absence of physical injury  Description  Absence of physical injury  Outcome: Ongoing  Note:   All fall precautions in place. No falls this shift. Personal belongings at bedside. Bed alarm on. Problem: Risk for Impaired Skin Integrity  Goal: Tissue integrity - skin and mucous membranes  Description  Structural intactness and normal physiological function of skin and  mucous membranes. Outcome: Ongoing  Note:   No new skin issues at this time. Turning and repositioning patient frequently. Patient bottom with a stage 2. Using EPC cream to bottom and powder to abdominal folds. Problem: Nausea/Vomiting:  Goal: Absence of nausea/vomiting  Description  Absence of nausea/vomiting  Outcome: Ongoing  Note:   Zofran PRN as needed. Patient still complains of nausea. Scopolamine patch also for nausea. Problem: Nausea/Vomiting:  Goal: Able to drink  Description  Able to drink  Outcome: Ongoing  Note:   Patient NPO at this time.      Problem: Nausea/Vomiting:  Goal: Able to eat  Description  Able to eat  5/15/2019 0358 by Brayden Cisneros RN  Outcome: Ongoing  Note:   Patient NPO     Problem: Nausea/Vomiting:  Goal: Ability to achieve adequate nutritional intake will improve  Description  Ability to achieve adequate nutritional intake will improve  Outcome: Ongoing  Note:   Patient NPO     Problem: Nutrition  Goal: Optimal nutrition therapy  Outcome: Ongoing  Note:   NPO     Problem: Nutrition  Goal: Optimal nutrition therapy  Outcome: Ongoing  Note:   NPO     Problem:   Goal: No urinary complication  5/04/5960 0965 by Brayden Cisneros RN  Outcome: Ongoing  Note:   Lloyd catheter in place with small amount of yellow cloudy urine in ayers bag. Problem: Musculor/Skeletal Functional Status  Goal: Highest potential functional level  Outcome: Ongoing  Note:   Ongoing      Problem: DISCHARGE BARRIERS  Goal: Patient's continuum of care needs are met  5/15/2019 0358 by Alden Vanessa RN  Outcome: Ongoing  Note:   No plans for discharge at this time. Social work on case. Patient and  active with plan of care. Problem: Pain:  Goal: Pain level will decrease  Description  Pain level will decrease  5/15/2019 0358 by Alden Vanessa RN  Outcome: Ongoing  Note:   Denies pain at this time. Problem: Pain:  Goal: Control of acute pain  Description  Control of acute pain  Outcome: Ongoing   Care plan reviewed with patient. Patient verbalize understanding of the plan of care and contribute to goal setting.

## 2019-05-15 NOTE — PROGRESS NOTES
1315: Attempted to visit pt and staff currently working with pt. Chart reviewed and no new needs identified. Staff to call if needs arise.

## 2019-05-16 LAB
GLUCOSE BLD-MCNC: 81 MG/DL (ref 70–108)
GLUCOSE BLD-MCNC: 87 MG/DL (ref 70–108)
GLUCOSE BLD-MCNC: 88 MG/DL (ref 70–108)
GLUCOSE BLD-MCNC: 90 MG/DL (ref 70–108)

## 2019-05-16 PROCEDURE — 2580000003 HC RX 258: Performed by: INTERNAL MEDICINE

## 2019-05-16 PROCEDURE — 99232 SBSQ HOSP IP/OBS MODERATE 35: CPT | Performed by: FAMILY MEDICINE

## 2019-05-16 PROCEDURE — 82948 REAGENT STRIP/BLOOD GLUCOSE: CPT

## 2019-05-16 PROCEDURE — 6370000000 HC RX 637 (ALT 250 FOR IP): Performed by: FAMILY MEDICINE

## 2019-05-16 PROCEDURE — 6360000002 HC RX W HCPCS: Performed by: NURSE PRACTITIONER

## 2019-05-16 PROCEDURE — 6360000002 HC RX W HCPCS: Performed by: INTERNAL MEDICINE

## 2019-05-16 PROCEDURE — 2500000003 HC RX 250 WO HCPCS: Performed by: NURSE PRACTITIONER

## 2019-05-16 PROCEDURE — 1200000000 HC SEMI PRIVATE

## 2019-05-16 PROCEDURE — 2709999900 HC NON-CHARGEABLE SUPPLY

## 2019-05-16 PROCEDURE — 99231 SBSQ HOSP IP/OBS SF/LOW 25: CPT | Performed by: NURSE PRACTITIONER

## 2019-05-16 PROCEDURE — 6360000002 HC RX W HCPCS: Performed by: FAMILY MEDICINE

## 2019-05-16 RX ADMIN — FAMOTIDINE 20 MG: 10 INJECTION, SOLUTION INTRAVENOUS at 08:16

## 2019-05-16 RX ADMIN — ONDANSETRON 4 MG: 2 INJECTION INTRAMUSCULAR; INTRAVENOUS at 03:41

## 2019-05-16 RX ADMIN — MICONAZOLE NITRATE: 20 POWDER TOPICAL at 08:16

## 2019-05-16 RX ADMIN — CEFTRIAXONE SODIUM 2 G: 2 INJECTION, POWDER, FOR SOLUTION INTRAMUSCULAR; INTRAVENOUS at 11:10

## 2019-05-16 RX ADMIN — FLUCONAZOLE 200 MG: 200 INJECTION, SOLUTION INTRAVENOUS at 08:16

## 2019-05-16 RX ADMIN — MICONAZOLE NITRATE: 20 POWDER TOPICAL at 22:08

## 2019-05-16 RX ADMIN — ENOXAPARIN SODIUM 40 MG: 40 INJECTION SUBCUTANEOUS at 08:16

## 2019-05-16 RX ADMIN — MORPHINE SULFATE 1 MG: 2 INJECTION, SOLUTION INTRAMUSCULAR; INTRAVENOUS at 11:09

## 2019-05-16 RX ADMIN — ONDANSETRON 4 MG: 2 INJECTION INTRAMUSCULAR; INTRAVENOUS at 11:09

## 2019-05-16 RX ADMIN — HYDRALAZINE HYDROCHLORIDE 10 MG: 20 INJECTION INTRAMUSCULAR; INTRAVENOUS at 03:40

## 2019-05-16 ASSESSMENT — PAIN SCALES - GENERAL
PAINLEVEL_OUTOF10: 0
PAINLEVEL_OUTOF10: 8

## 2019-05-16 NOTE — CARE COORDINATION
5/16/19, 2:36 PM    DISCHARGE BARRIERS    Patient to be discharged today to home with [address]. Transport request faxed to [facility] has advised that transport is for 3:30 pm. PO Smith Doctor with hospice notified of transport time and AVS faxed to [facility]. Spouse at bedside and updated. 5/16/19, 2:58 PM    Discharge plan discussed by  and . Discharge plan reviewed with patient/ family. Patient/ family verbalize understanding of discharge plan and are in agreement with plan. Understanding was demonstrated using the teach back method. Services After Discharge  Services At/After Discharge: Transport, Hospice([facility])   IMM Letter  IMM Letter given to Patient/Family/Significant other/Guardian/POA/by[de-identified] staff  IMM Letter date given[de-identified] 05/08/19       .

## 2019-05-16 NOTE — PROGRESS NOTES
Visit made to Abbi López and her  [address] [place] [address]. No concerns at this time. Told them that hospice will plan on getting to home within few hours of her getting home to sign on to services. Asked if any questions and at this time no. Dnr-CC left for signature by physician. Updated primary nurse hospice will admit when patient returning home.  Asked that nurse call with time of plan discharge and again when patient leaves hospital.

## 2019-05-16 NOTE — PROGRESS NOTES
Patient seen at request of staff to re assess buttocks. Patient resting in bed and with the assist of another nurse patient turned in the bed to her right side to assess the areas. Patient noted to be on a moisture wicking chux, in a hercules bed with pinxav on the area. Area cleansed lightly to remove the pinxav and coccyx and buttocks along with upper posterior thighs noted to have blanchable dark discoloration and redness with peeling skin (chronic staining and MASD).  (caregiver) present and states she is in bed or up in chair with depends on and he does his best to do position changes. He changes the depends when noted to be soiled and protects her skin with a moisture barrier cream.  Currently ayers in place. Will sign off and have staff reconsult if area worsening. Staff to continue to turn every 2 hours on the hercules dream air bed, use chux instead of depends and apply the pinxav.

## 2019-05-16 NOTE — PROGRESS NOTES
Hospitalist Progress Note    Patient:  Ronald Wagner      Unit/Bed:5K-26/026-A    YOB: 1945    MRN: [id number]       Acct: [de-identified]     PCP: Jamey Euceda MD    Date of Admission: 5/8/2019    Chief Complaint: transferred from [facility] for 8mm ureteral stone with hydronephrosis      Hospital Course:     Please see H/P for details. In summary, this is a 72 y.o. Female, w PMH of  DM 2, discitis, CKD, HTN, hx of stroke w right sided weakness, wheelchair-bound,  was transferred from [facility] for 8mm ureteral stone with hydronephrosis. [facility] [address] [place] [address] service. Urology was consulted. She had cystoscopy with removal of bladder stones, right ureteroscopy, basket retrieval of right ureteral stones, right ureterorenoscopy, basket retrieval of right renal stones and placement of right ureteral stent on 5/9/19. ID also following for the discitis, currently on rocephin. Palliative care and hospice team consulted on 5/12/19, /TREY Christian expressed home w hospice. Also pt failed swallow eval, now NPO. Subjective:     Patient seen and examined. Pt's  at bedside. Pt states nausea getting better. She denies nausea right now. She denies abd pain. Mid back pain is now intermittent. Denies back pain right now. Denies fever and chills.        Medications:  Reviewed    Infusion Medications    dextrose 5% in lactated ringers 40 mL/hr at 05/15/19 1600    dextrose       Scheduled Medications    fluconazole  200 mg Intravenous Q24H    hydrochlorothiazide  25 mg Oral Daily    lidocaine  1 patch Transdermal Daily    scopolamine  1 patch Transdermal Q72H    cefTRIAXone (ROCEPHIN) IV  2 g Intravenous Q24H    docusate sodium  100 mg Oral Daily    senna  1 tablet Oral Nightly    magnesium oxide  400 mg Oral Daily    famotidine (PEPCID) injection  20 mg Intravenous BID    miconazole   Topical BID    magnesium replacement protocol Other RX Placeholder    potassium (CARDIAC) replacement protocol   Other RX Placeholder    amLODIPine  5 mg Oral Daily    citalopram  10 mg Oral Daily    lisinopril  20 mg Oral BID    metoprolol tartrate  50 mg Oral BID    insulin glargine  20 Units Subcutaneous BID    sodium chloride flush  10 mL Intravenous 2 times per day    enoxaparin  40 mg Subcutaneous Daily    insulin lispro  0-6 Units Subcutaneous TID WC    insulin lispro  0-3 Units Subcutaneous Nightly     PRN Meds: hydrALAZINE, morphine, zinc oxide, HYDROcodone 5 mg - acetaminophen **OR** HYDROcodone 5 mg - acetaminophen, promethazine, ondansetron, sodium chloride flush, magnesium hydroxide, metoclopramide, acetaminophen, glucose, dextrose, glucagon (rDNA), dextrose      Intake/Output Summary (Last 24 hours) at 5/16/2019 1359  Last data filed at 5/16/2019 0310  Gross per 24 hour   Intake 652.17 ml   Output 275 ml   Net 377.17 ml       Diet:  Diet NPO Effective Now    Exam:  BP (!) 176/84   Pulse 93   Temp 97.4 °F (36.3 °C) (Oral)   Resp 18   Ht 5' 3\" (1.6 m)   Wt 182 lb 11.2 oz (82.9 kg)   SpO2 93%   BMI 32.36 kg/m²     General appearance: alert, not in acute distress. HEENT: Pupils equal, round, and reactive to light. Conjunctivae clear. Clear oral mucosa. Neck: Supple, with full range of motion. Respiratory:  Normal respiratory effort. Clear to auscultation, bilaterally without Rales/Wheezes/Rhonchi. Cardiovascular: normal rate, regular rhythm with normal S1/S2 without murmurs, rubs or gallops. Abdomen: Soft, non-tender, non-distended with normal bowel sounds. Musculoskeletal: passive and active ROM x 4 extremities. No back tenderness   Neurological exam reveals alert, oriented x3, normal speech, RUE: 3/5 ( chronic per  due to stroke 2.5 years ago), LUE: 5/5, RLE: 3/5 ( chronic per  due to stroke 2.5 years ago), LLE: 5/5. Intact sensation.    : on ayers cath w clear yellow urine, roughly 250 cc  Sacral: blanchable Right sided 8mm proximal ureteral stone with hydronephrosis hydronephrosis, s/p cystoscopy and right ureteroscopy and a renal scope E and basket retrieval of stones with removal of bladder stones and right ureteral stent was placed on 5/9/19     Followed by urology. Appreciate urology input. Urology signed off 5/10 and recommend to f/u w Dr. Leti Vila ureteral stent removal in 1 week. Per RN Jakob Parks, urology not planning to remove stent if not bothering the patient.   -hospice on-board. Appreciate hospice input. Plan for DC home today w hospice.      2. Dysphagia     -Failed swallow study per speech.  -Strict NPO. -Probably will not tolerate peg / NGT given condition. Pt refused GI c/s, refused PEG/NGT placement. Per /POA Devangcassi Marcelino, pt is considering home w hospice, when asked about code status, pt states she does not want intubation and chest compression. palliative care and hospice evaluated the pt on 5/13/19   -I spoke w hospice PO Vega, who recommend DC all home meds.      3. 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 Appreciate ID input. ID signed off 5/14/19  -per Dr. Danya Segundo, cont ceftriaxone while inpatient and stop on DC.      4. Candida albicans UTI     -cont diflucan IV while IP. Will stop on DC     5. N/V, improving     -will go to home w hospice. Management per hospice       6. Chronic back pain     -lidocaine patch  -management per hospice      7. HTN, uncontrolled     -pt cant take po meds due to strict NPO  -on hydralazine IV prn   -ordered clonidine patch but not available in formulary   -per hospice PO Agudelo, [facility]        8. DM type 2     -blood sugars acceptable  -on D5 LR right now since pt not eating   -SSI     9. Severe malnutrition     -dietician on-board      10. Hypokalemia, likely due to hypomagnesemia, resolved     11.  Hypomagnesemia     -initially improved, now magnesium of 1.8 on 5/10/19  -magnesium replacement protocol  Pt and  states they does not want

## 2019-05-16 NOTE — DISCHARGE SUMMARY
for details of hospital course:      1. Right sided 8mm proximal ureteral stone with hydronephrosis hydronephrosis, s/p cystoscopy and right ureteroscopy and a renal scope E and basket retrieval of stones with removal of bladder stones and right ureteral stent was placed on 5/9/19     Followed by urology. Appreciate urology input. Urology signed off 5/10 and recommend to f/u w Dr. Ishmael Bailey ureteral stent removal in 1 week. Per Urology NP Yovana Iqbal, APRN - CNP, urology is not planning to remove stent if not bothering the patient and to return in 1 year for stent removal  -hospice on-board. Appreciate hospice input. Plan for DC home today w hospice.      2. Dysphagia     -Failed swallow study per speech therapy  -Strict NPO. -Probably will not tolerate peg / NGT given condition. Pt refused GI c/s, refused PEG/NGT placement.  -hospice on-board   -I spoke w hospice RN Rosepine Pac, who recommend DC all home meds.      3. 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 Appreciate ID input. ID signed off 5/14/19  -per Dr. Bills Day, cont ceftriaxone while inpatient and stopped on DC.      4. Candida albicans UTI     -cont diflucan IV while IP. Diflucan stopped on DC      5. N/V, improving      -DC home w hospice. Management per hospice       6. Chronic back pain     -lidocaine patch  -management per hospice      7. HTN, uncontrolled     -pt cant take po meds due to strict NPO  -on hydralazine IV prn   -ordered clonidine patch but not available in formulary   -per hospice PO Ann home meds at DC         8. DM type 2     -blood sugars acceptable  -managed with D5 LR in the hospital since pt not eating   -SSI     9. Severe malnutrition     -dietician on-board      10. Hypokalemia, likely due to hypomagnesemia, resolved     11. Hypomagnesemia     -initially improved, now magnesium of 1.8 on 5/10/19  -magnesium replacement protocol  Pt and  states they does not want blood draw anymore.         12. Hx of stroke     13. Normocytic anemia     -Hgb ranges 8-10 since 4/2019. Hgb stable at 8.4. Pt and  states they does not want blood draw anymore. -denies sx of bleeding         Patient was discharged home with hospice in stable condition on 5/16/2019. Hospice team will follow-up on her care at home. Exam:     Vitals:  Vitals:    05/15/19 1959 05/15/19 2159 05/16/19 0310 05/16/19 0818   BP: (!) 191/86 (!) 125/58 (!) 188/81 (!) 176/84   Pulse: 81  87 93   Resp: 18 18 18   Temp: 97.8 °F (36.6 °C)  97.8 °F (36.6 °C) 97.4 °F (36.3 °C)   TempSrc: Oral  Oral Oral   SpO2: 97%  97% 93%   Weight:       Height:         Weight: Weight: 182 lb 11.2 oz (82.9 kg)     24 hour intake/output:    Intake/Output Summary (Last 24 hours) at 5/16/2019 1407  Last data filed at 5/16/2019 0310  Gross per 24 hour   Intake 652.17 ml   Output 275 ml   Net 377.17 ml       General appearance: alert, not in acute distress. HEENT: Pupils equal, round, and reactive to light. Conjunctivae clear. Clear oral mucosa. Neck: Supple, with full range of motion. Respiratory:  Normal respiratory effort. Clear to auscultation, bilaterally without Rales/Wheezes/Rhonchi. Cardiovascular: normal rate, regular rhythm with normal S1/S2 without murmurs, rubs or gallops. Abdomen: Soft, non-tender, non-distended with normal bowel sounds. Musculoskeletal: passive and active ROM x 4 extremities. No back tenderness   Neurological exam reveals alert, oriented x3, normal speech, RUE: 3/5 ( chronic per  due to stroke 2.5 years ago), LUE: 5/5, RLE: 3/5 ( chronic per  due to stroke 2.5 years ago), LLE: 5/5. Intact sensation. : on ayers cath w clear yellow urine, roughly 250 cc  Sacral: blanchable dark discoloration and redness with peeling skin   Exam of extremities: B/L pitting pedal edema grade 2 +        Labs:  For convenience and continuity at follow-up the following most recent labs are provided:      CBC:    Lab Results   Component Value Date    WBC 5.8 05/13/2019    HGB 8.4 05/15/2019    HCT 27.1 05/15/2019     05/13/2019       Renal:    Lab Results   Component Value Date     05/11/2019    K 3.5 05/11/2019    K 4.0 05/09/2019     05/11/2019    CO2 25 05/11/2019    BUN 11 05/11/2019    CREATININE 0.5 05/11/2019    CALCIUM 8.5 05/11/2019         Significant Diagnostic Studies    Radiology:   XR CHEST PORTABLE   Final Result   1. Mild increased perihilar interstitial infiltrates bilaterally. This may be related to mild interstitial edema. 2. Small right-sided pleural effusion. This appears slightly improved from the prior examination. 3. Multifocal patchy mild airspace opacities at the medial right upper lung zone and lateral right lung base may represent mild atelectasis or pneumonia. This appears improved at the right lung base but slightly worsened at the medial right upper lung    zone. 4. Recommend short-term follow-up radiographic evaluation. If findings persist, further characterization could be obtained with chest CT. **This report has been created using voice recognition software. It may contain minor errors which are inherent in voice recognition technology. **      Final report electronically signed by Dr. Summer Resendiz on 5/9/2019 3:54 PM      XR COMPARISON OF OUTSIDE FILMS   Final Result      CT COMPARISON OF OUTSIDE FILMS   Final Result             Consults:     IP CONSULT TO UROLOGY  IP CONSULT TO INFECTIOUS DISEASES  IP CONSULT TO SOCIAL WORK  IP CONSULT TO SOCIAL WORK  PALLIATIVE CARE EVAL  IP CONSULT TO HOSPICE  IP CONSULT TO GI  IP CONSULT TO DIETITIAN    Disposition:    [x] Home with hospice        [] TCU       [] Rehab       [] Psych       [] SNF       [] [place]       [] Other-    Condition at Discharge: Stable    Code Status:  Limited     Patient Instructions:    Discharge lab work: none   Activity: activity as tolerated  Diet: Diet NPO Effective Now      Follow-up visits:   Becka Farias Conner Diaz MD  [address] [facility] [address]  [phone]          [address]  [phone]             Discharge Medications: There are no discharge medications for this patient. Time Spent on discharge is more than 30 minutes in the examination, evaluation, counseling and review of medications and discharge plan. Signed: Thank you Tania Phipps MD for the opportunity to be involved in this patient's care.     Electronically signed by Adelina Yu MD on 5/16/2019 at 2:07 PM

## 2019-05-17 VITALS
DIASTOLIC BLOOD PRESSURE: 67 MMHG | SYSTOLIC BLOOD PRESSURE: 144 MMHG | HEIGHT: 63 IN | WEIGHT: 182.7 LBS | BODY MASS INDEX: 32.37 KG/M2 | RESPIRATION RATE: 18 BRPM | HEART RATE: 65 BPM | TEMPERATURE: 98.1 F | OXYGEN SATURATION: 97 %

## 2019-05-17 LAB — GLUCOSE BLD-MCNC: 80 MG/DL (ref 70–108)

## 2019-05-17 PROCEDURE — 82948 REAGENT STRIP/BLOOD GLUCOSE: CPT

## 2019-05-17 PROCEDURE — 99239 HOSP IP/OBS DSCHRG MGMT >30: CPT | Performed by: FAMILY MEDICINE

## 2019-05-17 RX ADMIN — MICONAZOLE NITRATE: 20 POWDER TOPICAL at 09:09

## 2019-05-17 NOTE — CARE COORDINATION
5/17/19, 8:59 AM    DISCHARGE BARRIERS    Discharge held until today. Transport arranged for 10 am-hospice staff notified.

## (undated) DEVICE — GLOVE SURG SZ 65 THK91MIL LTX FREE SYN POLYISOPRENE

## (undated) DEVICE — NITINOL STONE RETRIEVAL BASKET: Brand: ZERO TIP

## (undated) DEVICE — URETERAL ACCESS SHEATH SET: Brand: NAVIGATOR HD